# Patient Record
Sex: FEMALE | Race: WHITE | Employment: UNEMPLOYED | URBAN - METROPOLITAN AREA
[De-identification: names, ages, dates, MRNs, and addresses within clinical notes are randomized per-mention and may not be internally consistent; named-entity substitution may affect disease eponyms.]

---

## 2017-05-26 PROBLEM — G21.19 PARKINSONISM DUE TO DRUG (HCC): Status: ACTIVE | Noted: 2017-05-26

## 2017-08-26 PROBLEM — F31.2 BIPOLAR AFFECTIVE DISORDER, CURRENT EPISODE MANIC WITH PSYCHOTIC SYMPTOMS (HCC): Status: ACTIVE | Noted: 2017-08-26

## 2017-09-22 PROBLEM — G21.19 PARKINSONISM DUE TO DRUG (HCC): Status: RESOLVED | Noted: 2017-05-26 | Resolved: 2017-09-22

## 2017-09-22 PROBLEM — K21.9 GASTROESOPHAGEAL REFLUX DISEASE: Status: ACTIVE | Noted: 2017-09-22

## 2017-09-22 PROBLEM — F31.2 BIPOLAR AFFECTIVE DISORDER, CURRENT EPISODE MANIC WITH PSYCHOTIC SYMPTOMS (HCC): Status: RESOLVED | Noted: 2017-08-26 | Resolved: 2017-09-22

## 2017-09-22 PROBLEM — H35.30 MACULAR DEGENERATION, BILATERAL: Status: ACTIVE | Noted: 2017-09-22

## 2018-03-31 ENCOUNTER — HOSPITAL ENCOUNTER (EMERGENCY)
Age: 83
Discharge: HOME | End: 2018-03-31
Attending: EMERGENCY MEDICINE
Payer: COMMERCIAL

## 2018-03-31 ENCOUNTER — APPOINTMENT (OUTPATIENT)
Dept: GENERAL RADIOLOGY | Age: 83
End: 2018-03-31
Payer: COMMERCIAL

## 2018-03-31 VITALS
WEIGHT: 120 LBS | HEART RATE: 70 BPM | HEIGHT: 63 IN | SYSTOLIC BLOOD PRESSURE: 179 MMHG | RESPIRATION RATE: 20 BRPM | BODY MASS INDEX: 21.26 KG/M2 | DIASTOLIC BLOOD PRESSURE: 81 MMHG | OXYGEN SATURATION: 98 % | TEMPERATURE: 96.2 F

## 2018-03-31 DIAGNOSIS — R07.89 CHEST WALL PAIN: Primary | ICD-10-CM

## 2018-03-31 DIAGNOSIS — N30.00 ACUTE CYSTITIS WITHOUT HEMATURIA: ICD-10-CM

## 2018-03-31 LAB
ALBUMIN SERPL-MCNC: 4 G/DL (ref 3.5–5.2)
ALP BLD-CCNC: 31 U/L (ref 35–104)
ALT SERPL-CCNC: 5 U/L (ref 0–32)
ANION GAP SERPL CALCULATED.3IONS-SCNC: 12 MMOL/L (ref 7–16)
AST SERPL-CCNC: 19 U/L (ref 0–31)
BACTERIA: ABNORMAL /HPF
BASOPHILS ABSOLUTE: 0.03 E9/L (ref 0–0.2)
BASOPHILS RELATIVE PERCENT: 0.4 % (ref 0–2)
BILIRUB SERPL-MCNC: 0.4 MG/DL (ref 0–1.2)
BILIRUBIN URINE: NEGATIVE
BLOOD, URINE: ABNORMAL
BUN BLDV-MCNC: 13 MG/DL (ref 8–23)
CALCIUM SERPL-MCNC: 9.6 MG/DL (ref 8.6–10.2)
CHLORIDE BLD-SCNC: 101 MMOL/L (ref 98–107)
CLARITY: ABNORMAL
CO2: 31 MMOL/L (ref 22–29)
COLOR: YELLOW
CREAT SERPL-MCNC: 0.7 MG/DL (ref 0.5–1)
EOSINOPHILS ABSOLUTE: 0.18 E9/L (ref 0.05–0.5)
EOSINOPHILS RELATIVE PERCENT: 2.1 % (ref 0–6)
GFR AFRICAN AMERICAN: >60
GFR NON-AFRICAN AMERICAN: >60 ML/MIN/1.73
GLUCOSE BLD-MCNC: 102 MG/DL (ref 74–109)
GLUCOSE URINE: NEGATIVE MG/DL
HCT VFR BLD CALC: 39.8 % (ref 34–48)
HEMOGLOBIN: 13.2 G/DL (ref 11.5–15.5)
IMMATURE GRANULOCYTES #: 0.02 E9/L
IMMATURE GRANULOCYTES %: 0.2 % (ref 0–5)
KETONES, URINE: NEGATIVE MG/DL
LEUKOCYTE ESTERASE, URINE: ABNORMAL
LYMPHOCYTES ABSOLUTE: 2.93 E9/L (ref 1.5–4)
LYMPHOCYTES RELATIVE PERCENT: 34.9 % (ref 20–42)
MCH RBC QN AUTO: 30.2 PG (ref 26–35)
MCHC RBC AUTO-ENTMCNC: 33.2 % (ref 32–34.5)
MCV RBC AUTO: 91.1 FL (ref 80–99.9)
MONOCYTES ABSOLUTE: 0.67 E9/L (ref 0.1–0.95)
MONOCYTES RELATIVE PERCENT: 8 % (ref 2–12)
NEUTROPHILS ABSOLUTE: 4.56 E9/L (ref 1.8–7.3)
NEUTROPHILS RELATIVE PERCENT: 54.4 % (ref 43–80)
NITRITE, URINE: POSITIVE
PDW BLD-RTO: 13.1 FL (ref 11.5–15)
PH UA: 7.5 (ref 5–9)
PLATELET # BLD: 229 E9/L (ref 130–450)
PMV BLD AUTO: 9.2 FL (ref 7–12)
POTASSIUM SERPL-SCNC: 4 MMOL/L (ref 3.5–5)
PROTEIN UA: NEGATIVE MG/DL
RBC # BLD: 4.37 E12/L (ref 3.5–5.5)
RBC UA: ABNORMAL /HPF (ref 0–2)
SODIUM BLD-SCNC: 144 MMOL/L (ref 132–146)
SPECIFIC GRAVITY UA: 1.01 (ref 1–1.03)
TOTAL PROTEIN: 7.1 G/DL (ref 6.4–8.3)
TROPONIN: <0.01 NG/ML (ref 0–0.03)
UROBILINOGEN, URINE: 0.2 E.U./DL
WBC # BLD: 8.4 E9/L (ref 4.5–11.5)
WBC UA: >20 /HPF (ref 0–5)

## 2018-03-31 PROCEDURE — 71045 X-RAY EXAM CHEST 1 VIEW: CPT

## 2018-03-31 PROCEDURE — 81001 URINALYSIS AUTO W/SCOPE: CPT

## 2018-03-31 PROCEDURE — 85025 COMPLETE CBC W/AUTO DIFF WBC: CPT

## 2018-03-31 PROCEDURE — 93005 ELECTROCARDIOGRAM TRACING: CPT | Performed by: NURSE PRACTITIONER

## 2018-03-31 PROCEDURE — 36415 COLL VENOUS BLD VENIPUNCTURE: CPT

## 2018-03-31 PROCEDURE — 99285 EMERGENCY DEPT VISIT HI MDM: CPT

## 2018-03-31 PROCEDURE — 84484 ASSAY OF TROPONIN QUANT: CPT

## 2018-03-31 PROCEDURE — 80053 COMPREHEN METABOLIC PANEL: CPT

## 2018-03-31 RX ORDER — CEPHALEXIN 500 MG/1
500 CAPSULE ORAL 2 TIMES DAILY
Qty: 14 CAPSULE | Refills: 0 | Status: SHIPPED | OUTPATIENT
Start: 2018-03-31 | End: 2018-04-07

## 2018-03-31 ASSESSMENT — PAIN DESCRIPTION - LOCATION: LOCATION: ARM;CHEST

## 2018-03-31 ASSESSMENT — PAIN SCALES - GENERAL: PAINLEVEL_OUTOF10: 1

## 2018-03-31 ASSESSMENT — PAIN DESCRIPTION - PAIN TYPE: TYPE: ACUTE PAIN

## 2018-04-05 LAB
EKG ATRIAL RATE: 74 BPM
EKG P AXIS: 55 DEGREES
EKG P-R INTERVAL: 198 MS
EKG Q-T INTERVAL: 382 MS
EKG QRS DURATION: 76 MS
EKG QTC CALCULATION (BAZETT): 424 MS
EKG R AXIS: 33 DEGREES
EKG T AXIS: 61 DEGREES
EKG VENTRICULAR RATE: 74 BPM

## 2018-06-19 ENCOUNTER — HOSPITAL ENCOUNTER (EMERGENCY)
Age: 83
Discharge: NURSING, REHAB OR LONG-TERM CARE | End: 2018-06-19
Attending: EMERGENCY MEDICINE
Payer: COMMERCIAL

## 2018-06-19 VITALS
WEIGHT: 120 LBS | HEIGHT: 63 IN | SYSTOLIC BLOOD PRESSURE: 148 MMHG | OXYGEN SATURATION: 96 % | BODY MASS INDEX: 21.26 KG/M2 | TEMPERATURE: 97.7 F | RESPIRATION RATE: 16 BRPM | DIASTOLIC BLOOD PRESSURE: 69 MMHG | HEART RATE: 70 BPM

## 2018-06-19 DIAGNOSIS — R51.9 POUNDING IN HEAD: Primary | ICD-10-CM

## 2018-06-19 LAB
ALBUMIN SERPL-MCNC: 3.9 G/DL (ref 3.5–5.2)
ALP BLD-CCNC: 35 U/L (ref 35–104)
ALT SERPL-CCNC: 8 U/L (ref 0–32)
ANION GAP SERPL CALCULATED.3IONS-SCNC: 10 MMOL/L (ref 7–16)
AST SERPL-CCNC: 24 U/L (ref 0–31)
BACTERIA: ABNORMAL /HPF
BASOPHILS ABSOLUTE: 0.04 E9/L (ref 0–0.2)
BASOPHILS RELATIVE PERCENT: 0.6 % (ref 0–2)
BILIRUB SERPL-MCNC: 0.3 MG/DL (ref 0–1.2)
BILIRUBIN URINE: NEGATIVE
BLOOD, URINE: NEGATIVE
BUN BLDV-MCNC: 15 MG/DL (ref 8–23)
CALCIUM SERPL-MCNC: 9.6 MG/DL (ref 8.6–10.2)
CHLORIDE BLD-SCNC: 100 MMOL/L (ref 98–107)
CLARITY: CLEAR
CO2: 30 MMOL/L (ref 22–29)
COLOR: YELLOW
CREAT SERPL-MCNC: 0.7 MG/DL (ref 0.5–1)
EOSINOPHILS ABSOLUTE: 0.18 E9/L (ref 0.05–0.5)
EOSINOPHILS RELATIVE PERCENT: 2.9 % (ref 0–6)
GFR AFRICAN AMERICAN: >60
GFR NON-AFRICAN AMERICAN: >60 ML/MIN/1.73
GLUCOSE BLD-MCNC: 106 MG/DL (ref 74–109)
GLUCOSE URINE: NEGATIVE MG/DL
HCT VFR BLD CALC: 36.8 % (ref 34–48)
HEMOGLOBIN: 11.9 G/DL (ref 11.5–15.5)
IMMATURE GRANULOCYTES #: 0 E9/L
IMMATURE GRANULOCYTES %: 0 % (ref 0–5)
KETONES, URINE: NEGATIVE MG/DL
LACTIC ACID: 0.9 MMOL/L (ref 0.5–2.2)
LEUKOCYTE ESTERASE, URINE: ABNORMAL
LYMPHOCYTES ABSOLUTE: 2.91 E9/L (ref 1.5–4)
LYMPHOCYTES RELATIVE PERCENT: 46.4 % (ref 20–42)
MCH RBC QN AUTO: 29.2 PG (ref 26–35)
MCHC RBC AUTO-ENTMCNC: 32.3 % (ref 32–34.5)
MCV RBC AUTO: 90.2 FL (ref 80–99.9)
MONOCYTES ABSOLUTE: 0.61 E9/L (ref 0.1–0.95)
MONOCYTES RELATIVE PERCENT: 9.7 % (ref 2–12)
NEUTROPHILS ABSOLUTE: 2.53 E9/L (ref 1.8–7.3)
NEUTROPHILS RELATIVE PERCENT: 40.4 % (ref 43–80)
NITRITE, URINE: NEGATIVE
PDW BLD-RTO: 13.6 FL (ref 11.5–15)
PH UA: 6.5 (ref 5–9)
PLATELET # BLD: 204 E9/L (ref 130–450)
PMV BLD AUTO: 10.1 FL (ref 7–12)
POTASSIUM SERPL-SCNC: 4.5 MMOL/L (ref 3.5–5)
PROTEIN UA: NEGATIVE MG/DL
RBC # BLD: 4.08 E12/L (ref 3.5–5.5)
RBC UA: ABNORMAL /HPF (ref 0–2)
SODIUM BLD-SCNC: 140 MMOL/L (ref 132–146)
SPECIFIC GRAVITY UA: <=1.005 (ref 1–1.03)
TOTAL PROTEIN: 7.2 G/DL (ref 6.4–8.3)
UROBILINOGEN, URINE: 0.2 E.U./DL
WBC # BLD: 6.3 E9/L (ref 4.5–11.5)
WBC UA: ABNORMAL /HPF (ref 0–5)

## 2018-06-19 PROCEDURE — 80053 COMPREHEN METABOLIC PANEL: CPT

## 2018-06-19 PROCEDURE — 36415 COLL VENOUS BLD VENIPUNCTURE: CPT

## 2018-06-19 PROCEDURE — 6360000002 HC RX W HCPCS: Performed by: EMERGENCY MEDICINE

## 2018-06-19 PROCEDURE — 96375 TX/PRO/DX INJ NEW DRUG ADDON: CPT

## 2018-06-19 PROCEDURE — 2580000003 HC RX 258: Performed by: EMERGENCY MEDICINE

## 2018-06-19 PROCEDURE — 99284 EMERGENCY DEPT VISIT MOD MDM: CPT

## 2018-06-19 PROCEDURE — 93005 ELECTROCARDIOGRAM TRACING: CPT | Performed by: EMERGENCY MEDICINE

## 2018-06-19 PROCEDURE — 96374 THER/PROPH/DIAG INJ IV PUSH: CPT

## 2018-06-19 PROCEDURE — 83605 ASSAY OF LACTIC ACID: CPT

## 2018-06-19 PROCEDURE — 85025 COMPLETE CBC W/AUTO DIFF WBC: CPT

## 2018-06-19 PROCEDURE — 81001 URINALYSIS AUTO W/SCOPE: CPT

## 2018-06-19 RX ORDER — OLANZAPINE 7.5 MG/1
7.5 TABLET ORAL DAILY
COMMUNITY

## 2018-06-19 RX ORDER — LORAZEPAM 0.5 MG/1
0.5 TABLET ORAL 3 TIMES DAILY
COMMUNITY
End: 2018-06-30 | Stop reason: SDUPTHER

## 2018-06-19 RX ORDER — MONTELUKAST SODIUM 10 MG/1
10 TABLET ORAL NIGHTLY
COMMUNITY

## 2018-06-19 RX ORDER — DIPHENHYDRAMINE HYDROCHLORIDE 50 MG/ML
25 INJECTION INTRAMUSCULAR; INTRAVENOUS ONCE
Status: COMPLETED | OUTPATIENT
Start: 2018-06-19 | End: 2018-06-19

## 2018-06-19 RX ORDER — BISACODYL 10 MG
10 SUPPOSITORY, RECTAL RECTAL DAILY PRN
COMMUNITY

## 2018-06-19 RX ORDER — 0.9 % SODIUM CHLORIDE 0.9 %
1000 INTRAVENOUS SOLUTION INTRAVENOUS ONCE
Status: COMPLETED | OUTPATIENT
Start: 2018-06-19 | End: 2018-06-19

## 2018-06-19 RX ORDER — METOCLOPRAMIDE HYDROCHLORIDE 5 MG/ML
10 INJECTION INTRAMUSCULAR; INTRAVENOUS ONCE
Status: COMPLETED | OUTPATIENT
Start: 2018-06-19 | End: 2018-06-19

## 2018-06-19 RX ORDER — RIVASTIGMINE TARTRATE 6 MG/1
6 CAPSULE ORAL 2 TIMES DAILY
COMMUNITY

## 2018-06-19 RX ORDER — ONDANSETRON 4 MG/1
4 TABLET, FILM COATED ORAL EVERY 8 HOURS PRN
COMMUNITY
End: 2019-08-18

## 2018-06-19 RX ORDER — MEMANTINE HYDROCHLORIDE 10 MG/1
10 TABLET ORAL 2 TIMES DAILY
COMMUNITY

## 2018-06-19 RX ORDER — DOCUSATE SODIUM 100 MG/1
100 CAPSULE, LIQUID FILLED ORAL 2 TIMES DAILY
COMMUNITY

## 2018-06-19 RX ORDER — OXYBUTYNIN CHLORIDE 10 MG/1
10 TABLET, EXTENDED RELEASE ORAL DAILY
COMMUNITY

## 2018-06-19 RX ADMIN — DIPHENHYDRAMINE HYDROCHLORIDE 25 MG: 50 INJECTION, SOLUTION INTRAMUSCULAR; INTRAVENOUS at 17:34

## 2018-06-19 RX ADMIN — SODIUM CHLORIDE 1000 ML: 9 INJECTION, SOLUTION INTRAVENOUS at 16:51

## 2018-06-19 RX ADMIN — METOCLOPRAMIDE 10 MG: 5 INJECTION, SOLUTION INTRAMUSCULAR; INTRAVENOUS at 17:34

## 2018-06-19 ASSESSMENT — ENCOUNTER SYMPTOMS
EYE DISCHARGE: 0
DIARRHEA: 0
COUGH: 0
ABDOMINAL DISTENTION: 0
EYE REDNESS: 0
EYE PAIN: 0
NAUSEA: 0
VOMITING: 0
SHORTNESS OF BREATH: 0
SINUS PRESSURE: 0
WHEEZING: 0
BACK PAIN: 0
SORE THROAT: 0

## 2018-06-22 LAB
EKG ATRIAL RATE: 61 BPM
EKG P AXIS: 43 DEGREES
EKG P-R INTERVAL: 204 MS
EKG Q-T INTERVAL: 404 MS
EKG QRS DURATION: 88 MS
EKG QTC CALCULATION (BAZETT): 406 MS
EKG R AXIS: 26 DEGREES
EKG T AXIS: 69 DEGREES
EKG VENTRICULAR RATE: 61 BPM

## 2018-10-25 ENCOUNTER — TELEPHONE (OUTPATIENT)
Dept: ORTHOPEDIC SURGERY | Age: 83
End: 2018-10-25

## 2018-10-25 DIAGNOSIS — G89.29 CHRONIC PAIN OF RIGHT KNEE: Primary | ICD-10-CM

## 2018-10-25 DIAGNOSIS — M25.561 CHRONIC PAIN OF RIGHT KNEE: Primary | ICD-10-CM

## 2018-11-13 ENCOUNTER — HOSPITAL ENCOUNTER (OUTPATIENT)
Dept: GENERAL RADIOLOGY | Age: 83
Discharge: HOME | End: 2018-11-15
Payer: COMMERCIAL

## 2018-11-13 ENCOUNTER — OFFICE VISIT (OUTPATIENT)
Dept: ORTHOPEDIC SURGERY | Age: 83
End: 2018-11-13
Payer: COMMERCIAL

## 2018-11-13 VITALS — DIASTOLIC BLOOD PRESSURE: 83 MMHG | RESPIRATION RATE: 18 BRPM | HEART RATE: 68 BPM | SYSTOLIC BLOOD PRESSURE: 152 MMHG

## 2018-11-13 DIAGNOSIS — M17.11 PRIMARY OSTEOARTHRITIS OF RIGHT KNEE: ICD-10-CM

## 2018-11-13 DIAGNOSIS — G89.29 CHRONIC RIGHT-SIDED LOW BACK PAIN WITH RIGHT-SIDED SCIATICA: ICD-10-CM

## 2018-11-13 DIAGNOSIS — G89.29 CHRONIC PAIN OF RIGHT KNEE: ICD-10-CM

## 2018-11-13 DIAGNOSIS — M25.561 CHRONIC PAIN OF RIGHT KNEE: ICD-10-CM

## 2018-11-13 DIAGNOSIS — M54.41 CHRONIC RIGHT-SIDED LOW BACK PAIN WITH RIGHT-SIDED SCIATICA: ICD-10-CM

## 2018-11-13 PROCEDURE — 73560 X-RAY EXAM OF KNEE 1 OR 2: CPT

## 2018-11-13 PROCEDURE — G8428 CUR MEDS NOT DOCUMENT: HCPCS | Performed by: ORTHOPAEDIC SURGERY

## 2018-11-13 PROCEDURE — G8420 CALC BMI NORM PARAMETERS: HCPCS | Performed by: ORTHOPAEDIC SURGERY

## 2018-11-13 PROCEDURE — 1123F ACP DISCUSS/DSCN MKR DOCD: CPT | Performed by: ORTHOPAEDIC SURGERY

## 2018-11-13 PROCEDURE — 4040F PNEUMOC VAC/ADMIN/RCVD: CPT | Performed by: ORTHOPAEDIC SURGERY

## 2018-11-13 PROCEDURE — 1090F PRES/ABSN URINE INCON ASSESS: CPT | Performed by: ORTHOPAEDIC SURGERY

## 2018-11-13 PROCEDURE — 1036F TOBACCO NON-USER: CPT | Performed by: ORTHOPAEDIC SURGERY

## 2018-11-13 PROCEDURE — 1101F PT FALLS ASSESS-DOCD LE1/YR: CPT | Performed by: ORTHOPAEDIC SURGERY

## 2018-11-13 PROCEDURE — G8400 PT W/DXA NO RESULTS DOC: HCPCS | Performed by: ORTHOPAEDIC SURGERY

## 2018-11-13 PROCEDURE — G8484 FLU IMMUNIZE NO ADMIN: HCPCS | Performed by: ORTHOPAEDIC SURGERY

## 2018-11-13 PROCEDURE — 99213 OFFICE O/P EST LOW 20 MIN: CPT | Performed by: ORTHOPAEDIC SURGERY

## 2018-11-13 PROCEDURE — 99202 OFFICE O/P NEW SF 15 MIN: CPT

## 2018-11-13 RX ORDER — LORATADINE 10 MG/1
10 TABLET ORAL DAILY
COMMUNITY

## 2018-11-13 RX ORDER — LORAZEPAM 0.5 MG/1
1 TABLET ORAL 2 TIMES DAILY
COMMUNITY

## 2018-11-13 RX ORDER — VITAMIN E 268 MG
400 CAPSULE ORAL DAILY
COMMUNITY

## 2018-11-13 RX ORDER — MIRTAZAPINE 30 MG/1
22.5 TABLET, FILM COATED ORAL NIGHTLY
COMMUNITY

## 2018-11-13 RX ORDER — CHOLECALCIFEROL (VITAMIN D3) 125 MCG
5 CAPSULE ORAL DAILY
COMMUNITY

## 2018-11-13 NOTE — PROGRESS NOTES
Established Patient      Lindsey Del Real is a 80 y.o. female, herdate of birth is 5/12/1933 with the following history as recorded in [facility]:    HPI: Patient is a very pleasant 80-year-old female who comes in today with a chief complaint of right buttock and right knee pain. She is a resident at a nursing facility was in an due to x-ray showing right knee osteoarthritis. She is apparently been having pain especially in the mornings and her right lower back, right buttock and right knee. She is sent to see if there is a mainly needed to do with her right knee osteoarthritis. Her son accompanied to her appointment today. She denies any trauma. She states that the back pain has been going on for some time now. She states his private over 3 months. Patient Active Problem List    Diagnosis Date Noted    Primary osteoarthritis of right knee 11/13/2018    Chronic right-sided low back pain with right-sided sciatica 11/13/2018    Gastroesophageal reflux disease 09/22/2017    Macular degeneration, bilateral 09/22/2017    Anxiety disorder 11/16/2016    Depression 04/23/2016    Bipolar affective disorder, current episode manic with psychotic symptoms ([facility] [address] 04/20/2016    Cardiac standstill ([facility] [address] 10/23/2011    Urinary tract infection without hematuria 10/19/2011     Current Outpatient Prescriptions   Medication Sig Dispense Refill    LORazepam (ATIVAN) 0.5 MG tablet Take 0.5 mg by mouth every 6 hours as needed for Anxiety. Cynthia Dennis vitamin D 1000 units CAPS Take 2 capsules by mouth daily      loratadine (CLARITIN) 10 MG tablet Take 10 mg by mouth daily      melatonin 3 MG TABS tablet Take 5 mg by mouth daily      mirtazapine (REMERON) 30 MG tablet Take 30 mg by mouth nightly      vitamin E 400 UNIT capsule Take 400 Units by mouth daily      diclofenac sodium 1 % GEL Apply 2 g topically 4 times daily      oxybutynin (DITROPAN-XL) 10 MG extended release tablet Take 10 mg by mouth daily      montelukast family history.   Social History   Substance Use Topics    Smoking status: Never Smoker    Smokeless tobacco: Never Used    Alcohol use No       Review of Systems     Review of Systems - General ROS: negative for - chills, fatigue, fever, malaise or night sweats  Ophthalmic ROS: negative for - blurry vision, decreased vision, double vision, dry eyes, excessive tearing, eye pain or loss of vision  ENT ROS: negative for - headaches, hearing change, nasal congestion, sinus pain, sore throat, tinnitus or vertigo  Allergy and Immunology ROS: negative for - itchy/watery eyes, nasal congestion, postnasal drip or seasonal allergies  Hematological and Lymphatic ROS: negative for - bleeding problems, blood clots, bruising, fatigue, jaundice, night sweats or swollen lymph nodes  Endocrine ROS: negative for - mood swings, palpitations, polydipsia/polyuria, skin changes or temperature intolerance  Respiratory ROS: no cough, shortness of breath, or wheezing  Cardiovascular ROS: no chest pain or dyspnea on exertion  Gastrointestinal ROS: no abdominal pain, change in bowel habits, or black or bloody stools  Genito-Urinary ROS: no dysuria, trouble voiding, or hematuria  Musculoskeletal ROS: Right-sided back pain and right lower extremity pain  Neurological ROS: no TIA or stroke symptoms      Physical Examination:   Vitals:    11/13/18 1202   BP: (!) 152/83   Pulse: 68   Resp: 18       Physical Examination: General appearance - alert, well appearing, and in no distress, oriented to person, place, and time and overweight  Mental status - alert, oriented to person, place, and time, normal mood, behavior, speech, dress, motor activity, and thought processes  Back exam - full range of motion, no tenderness, palpable spasm or pain on motion, negative straight leg raise on the left but slightly positive on the right, sacroiliac joints and sciatic notches nontender, normal reflexes and strength bilateral lower extremities, sensory exam

## 2019-05-26 ENCOUNTER — APPOINTMENT (OUTPATIENT)
Dept: GENERAL RADIOLOGY | Age: 84
End: 2019-05-26
Payer: COMMERCIAL

## 2019-05-26 ENCOUNTER — HOSPITAL ENCOUNTER (EMERGENCY)
Age: 84
Discharge: HOME | End: 2019-05-26
Attending: EMERGENCY MEDICINE
Payer: COMMERCIAL

## 2019-05-26 VITALS
BODY MASS INDEX: 21.26 KG/M2 | SYSTOLIC BLOOD PRESSURE: 185 MMHG | OXYGEN SATURATION: 97 % | DIASTOLIC BLOOD PRESSURE: 57 MMHG | RESPIRATION RATE: 20 BRPM | HEART RATE: 78 BPM | WEIGHT: 120 LBS | TEMPERATURE: 98 F | HEIGHT: 63 IN

## 2019-05-26 DIAGNOSIS — K59.00 CONSTIPATION, UNSPECIFIED CONSTIPATION TYPE: Primary | ICD-10-CM

## 2019-05-26 PROCEDURE — 99283 EMERGENCY DEPT VISIT LOW MDM: CPT

## 2019-05-26 PROCEDURE — 74019 RADEX ABDOMEN 2 VIEWS: CPT

## 2019-05-26 ASSESSMENT — PAIN DESCRIPTION - DESCRIPTORS: DESCRIPTORS: CRAMPING;DISCOMFORT

## 2019-05-26 ASSESSMENT — PAIN DESCRIPTION - PAIN TYPE
TYPE: ACUTE PAIN
TYPE: ACUTE PAIN

## 2019-05-26 ASSESSMENT — PAIN DESCRIPTION - LOCATION
LOCATION: ABDOMEN
LOCATION: ABDOMEN;RECTUM

## 2019-05-26 ASSESSMENT — PAIN SCALES - GENERAL
PAINLEVEL_OUTOF10: 8
PAINLEVEL_OUTOF10: 8

## 2019-05-26 ASSESSMENT — PAIN DESCRIPTION - ORIENTATION: ORIENTATION: LOWER

## 2019-05-26 ASSESSMENT — PAIN - FUNCTIONAL ASSESSMENT: PAIN_FUNCTIONAL_ASSESSMENT: PREVENTS OR INTERFERES WITH MANY ACTIVE NOT PASSIVE ACTIVITIES

## 2019-05-26 ASSESSMENT — PAIN DESCRIPTION - ONSET: ONSET: ON-GOING

## 2019-05-26 ASSESSMENT — PAIN DESCRIPTION - FREQUENCY: FREQUENCY: CONTINUOUS

## 2019-05-26 NOTE — ED NOTES
Called transport for [facility], they will be working on arranging ride for pt to go back to [place] [facility].      Leonides Salgado RN  05/26/19 1932

## 2019-05-26 NOTE — ED PROVIDER NOTES
HPI:  5/26/19,   Time: 1:40 PM         Hayden Damon is a 80 y.o. female presenting to the ED for Constipation pain and dysuria , beginning several hours ago. The complaint has been persistent, moderate in severity, and worsened by nothing. Started several days ago. Patient's unable to urinate. She denies diarrhea nausea or vomiting. She does have pain per around the rectum. States she was on the toilet for 2 hours trying to move her bowels. She has no fevers chills chest pain or shortness of breath. ROS:   Pertinent positives and negatives are stated within HPI, all other systems reviewed and are negative.  --------------------------------------------- PAST HISTORY ---------------------------------------------  Past Medical History:  has a past medical history of Bipolar affective disorder, current episode manic with psychotic symptoms ([facility] [address] Cancer ([facility] [address] Chronic right-sided low back pain with right-sided sciatica, Depression, Diabetes mellitus ([facility] [address] Macular degeneration, and Primary osteoarthritis of right knee. Past Surgical History:  has a past surgical history that includes Hysterectomy; Appendectomy; Tonsillectomy; Cardiac catheterization (10/21/2011); fracture surgery; Upper gastrointestinal endoscopy (1/13/16); Ankle fracture surgery (Left, [id number]); and partial hysterectomy (cervix not removed). Social History:  reports that she has never smoked. She has never used smokeless tobacco. She reports that she does not drink alcohol or use drugs. Family History: family history is not on file. The patients home medications have been reviewed. Allergies: Patient has no known allergies. -------------------------------------------------- RESULTS -------------------------------------------------  All laboratory and radiology results have been personally reviewed by myself   LABS:  No results found for this visit on 05/26/19.     RADIOLOGY:  Interpreted by Radiologist.  XR ABDOMEN (2 VIEWS)   Final Result   Nonspecific bowel gas pattern. There is a large amount of stool throughout the colon. Please   correlate clinically for constipation                ------------------------- NURSING NOTES AND VITALS REVIEWED ---------------------------   The nursing notes within the ED encounter and vital signs as below have been reviewed. BP (!) 185/57   Pulse 78   Temp 98 °F (36.7 °C) (Oral)   Resp 20   Ht 5' 3\" (1.6 m)   Wt 120 lb (54.4 kg)   LMP  (LMP Unknown)   SpO2 97%   BMI 21.26 kg/m²   Oxygen Saturation Interpretation: Normal      ---------------------------------------------------PHYSICAL EXAM--------------------------------------      Constitutional/General: Alert and oriented x3, well appearing, non toxic in NAD, thin in appearance. Head: NC/AT  Eyes: PERRL, EOMI  Mouth: Oropharynx clear, handling secretions, no trismus  Neck: Supple, full ROM, no meningeal signs  Pulmonary: Lungs clear to auscultation bilaterally, no wheezes, rales, or rhonchi. Not in respiratory distress  Cardiovascular:  Regular rate and rhythm, no murmurs, gallops, or rubs. 2+ distal pulses  Abdomen: Soft, non tender, non distended,   Extremities: Moves all extremities x 4. Warm and well perfused  Skin: warm and dry without rash  Neurologic: GCS 15, NO focal deficits. Psych: Normal Affect      ------------------------------ ED COURSE/MEDICAL DECISION MAKING----------------------  Medications - No data to display      Medical Decision Making:    KUB demonstrates large amount of stool. Patient is given soapsuds enema. DC home with RX for Magnesium Citrate. Counseling: The emergency provider has spoken with the patient and discussed todays results, in addition to providing specific details for the plan of care and counseling regarding the diagnosis and prognosis.   Questions are answered at this time and they are agreeable with the plan.      --------------------------------- IMPRESSION AND DISPOSITION ---------------------------------    IMPRESSION  1.  Constipation, unspecified constipation type        DISPOSITION  Disposition: Discharge to home  Patient condition is stable                Abbe Lowe DO  05/27/19 3314

## 2019-05-26 NOTE — ED NOTES
Soap suds enema given approx 30 mins, very little amount of stool released. Flatulence and liquid from enema were explained. lg void.  bsc was given to pt and she was placed on     Shaista Faustina, [place]  05/26/19 5524

## 2019-05-27 NOTE — ED NOTES
Son was also called, he was unable to  pt due to being out of town. Anthony Glynn is on way to transport pt back to [facility] on [address].      Hoda Cook RN  05/26/19 9733

## 2019-07-06 ENCOUNTER — OUTSIDE SERVICES (OUTPATIENT)
Dept: PODIATRY | Age: 84
End: 2019-07-06
Payer: COMMERCIAL

## 2019-07-06 DIAGNOSIS — Z79.4 TYPE 2 DIABETES MELLITUS WITHOUT COMPLICATION, WITH LONG-TERM CURRENT USE OF INSULIN (HCC): ICD-10-CM

## 2019-07-06 DIAGNOSIS — E11.9 TYPE 2 DIABETES MELLITUS WITHOUT COMPLICATION, WITH LONG-TERM CURRENT USE OF INSULIN (HCC): ICD-10-CM

## 2019-07-06 DIAGNOSIS — B35.1 TINEA UNGUIUM: Primary | ICD-10-CM

## 2019-07-06 DIAGNOSIS — L84 CORN OR CALLUS: ICD-10-CM

## 2019-07-06 DIAGNOSIS — M79.675 PAIN IN LEFT TOE(S): ICD-10-CM

## 2019-07-06 DIAGNOSIS — M79.674 PAIN IN TOE OF RIGHT FOOT: ICD-10-CM

## 2019-07-06 DIAGNOSIS — I73.9 PERIPHERAL VASCULAR DISEASE, UNSPECIFIED (HCC): ICD-10-CM

## 2019-07-06 DIAGNOSIS — R26.2 DIFFICULTY WALKING: ICD-10-CM

## 2019-07-06 PROCEDURE — 11721 DEBRIDE NAIL 6 OR MORE: CPT | Performed by: PODIATRIST

## 2019-07-06 PROCEDURE — 11055 PARING/CUTG B9 HYPRKER LES 1: CPT | Performed by: PODIATRIST

## 2019-07-06 NOTE — PROGRESS NOTES
[id number] [facility] patient     No chief complaint on file. Subjective: Gladys Tam is seen in nursing home  per nursing for foot and nail care and callus care   Pt currently has complaint of thickened, painful, elongated nails that he/she cannot manage by themselves. Pt. Relates pain to nails with shoe gear. Pt's primary care physician is Dr. Hero Edwards last visit 6/20/2019  Past Medical History:   Diagnosis Date    Bipolar affective disorder, current episode manic with psychotic symptoms ([facility] [address] 8/26/2017    Cancer ([facility] [address]     Chronic right-sided low back pain with right-sided sciatica 11/13/2018    Depression     Diabetes mellitus ([facility] [address]     Macular degeneration     Primary osteoarthritis of right knee 11/13/2018       No Known Allergies  Current Outpatient Medications on File Prior to Visit   Medication Sig Dispense Refill    magnesium citrate solution Take 296 mLs by mouth once for 1 dose 296 mL 0    LORazepam (ATIVAN) 0.5 MG tablet Take 0.5 mg by mouth every 6 hours as needed for Anxiety. Kilo Sharpe vitamin D 1000 units CAPS Take 2 capsules by mouth daily      loratadine (CLARITIN) 10 MG tablet Take 10 mg by mouth daily      melatonin 3 MG TABS tablet Take 5 mg by mouth daily      mirtazapine (REMERON) 30 MG tablet Take 30 mg by mouth nightly      vitamin E 400 UNIT capsule Take 400 Units by mouth daily      diclofenac sodium 1 % GEL Apply 2 g topically 4 times daily      oxybutynin (DITROPAN-XL) 10 MG extended release tablet Take 10 mg by mouth daily      montelukast (SINGULAIR) 10 MG tablet Take 10 mg by mouth nightly      VORTIoxetine (TRINTELLIX) 10 MG TABS tablet Take 10 mg by mouth nightly      docusate sodium (COLACE) 100 MG capsule Take 100 mg by mouth 2 times daily      rivastigmine (EXELON) 6 MG capsule Take 6 mg by mouth 2 times daily      memantine (NAMENDA) 10 MG tablet Take 10 mg by mouth 2 times daily      OLANZapine (ZYPREXA) 2.5 MG tablet Take 2.5

## 2019-08-18 ENCOUNTER — HOSPITAL ENCOUNTER (EMERGENCY)
Age: 84
Discharge: OTHER INSTITUTION | End: 2019-08-18
Attending: EMERGENCY MEDICINE
Payer: COMMERCIAL

## 2019-08-18 ENCOUNTER — APPOINTMENT (OUTPATIENT)
Dept: CT IMAGING | Age: 84
End: 2019-08-18
Payer: COMMERCIAL

## 2019-08-18 VITALS
RESPIRATION RATE: 16 BRPM | SYSTOLIC BLOOD PRESSURE: 132 MMHG | WEIGHT: 120 LBS | BODY MASS INDEX: 21.26 KG/M2 | DIASTOLIC BLOOD PRESSURE: 92 MMHG | HEART RATE: 72 BPM | OXYGEN SATURATION: 96 % | TEMPERATURE: 97.9 F | HEIGHT: 63 IN

## 2019-08-18 DIAGNOSIS — S32.028A OTHER CLOSED FRACTURE OF SECOND LUMBAR VERTEBRA, INITIAL ENCOUNTER (HCC): Primary | ICD-10-CM

## 2019-08-18 DIAGNOSIS — R31.9 URINARY TRACT INFECTION WITH HEMATURIA, SITE UNSPECIFIED: ICD-10-CM

## 2019-08-18 DIAGNOSIS — N39.0 URINARY TRACT INFECTION WITH HEMATURIA, SITE UNSPECIFIED: ICD-10-CM

## 2019-08-18 DIAGNOSIS — F41.1 ANXIETY STATE: ICD-10-CM

## 2019-08-18 DIAGNOSIS — I10 HYPERTENSION, UNSPECIFIED TYPE: ICD-10-CM

## 2019-08-18 LAB
ALBUMIN SERPL-MCNC: 3.8 G/DL (ref 3.5–5.2)
ALP BLD-CCNC: 47 U/L (ref 35–104)
ALT SERPL-CCNC: 12 U/L (ref 0–32)
ANION GAP SERPL CALCULATED.3IONS-SCNC: 14 MMOL/L (ref 7–16)
AST SERPL-CCNC: 28 U/L (ref 0–31)
BACTERIA: ABNORMAL /HPF
BASOPHILS ABSOLUTE: 0.03 E9/L (ref 0–0.2)
BASOPHILS RELATIVE PERCENT: 0.4 % (ref 0–2)
BILIRUB SERPL-MCNC: 0.5 MG/DL (ref 0–1.2)
BILIRUBIN URINE: NEGATIVE
BLOOD, URINE: ABNORMAL
BUN BLDV-MCNC: 11 MG/DL (ref 8–23)
CALCIUM SERPL-MCNC: 9.6 MG/DL (ref 8.6–10.2)
CHLORIDE BLD-SCNC: 99 MMOL/L (ref 98–107)
CLARITY: ABNORMAL
CO2: 26 MMOL/L (ref 22–29)
COLOR: YELLOW
CREAT SERPL-MCNC: 0.6 MG/DL (ref 0.5–1)
EOSINOPHILS ABSOLUTE: 0.01 E9/L (ref 0.05–0.5)
EOSINOPHILS RELATIVE PERCENT: 0.1 % (ref 0–6)
EPITHELIAL CELLS, UA: ABNORMAL /HPF
GFR AFRICAN AMERICAN: >60
GFR NON-AFRICAN AMERICAN: >60 ML/MIN/1.73
GLUCOSE BLD-MCNC: 114 MG/DL (ref 74–99)
GLUCOSE URINE: NEGATIVE MG/DL
HCT VFR BLD CALC: 41.1 % (ref 34–48)
HEMOGLOBIN: 13.6 G/DL (ref 11.5–15.5)
IMMATURE GRANULOCYTES #: 0.02 E9/L
IMMATURE GRANULOCYTES %: 0.3 % (ref 0–5)
KETONES, URINE: NEGATIVE MG/DL
LEUKOCYTE ESTERASE, URINE: ABNORMAL
LIPASE: 12 U/L (ref 13–60)
LYMPHOCYTES ABSOLUTE: 1.71 E9/L (ref 1.5–4)
LYMPHOCYTES RELATIVE PERCENT: 22.7 % (ref 20–42)
MCH RBC QN AUTO: 30.6 PG (ref 26–35)
MCHC RBC AUTO-ENTMCNC: 33.1 % (ref 32–34.5)
MCV RBC AUTO: 92.6 FL (ref 80–99.9)
MONOCYTES ABSOLUTE: 0.6 E9/L (ref 0.1–0.95)
MONOCYTES RELATIVE PERCENT: 8 % (ref 2–12)
NEUTROPHILS ABSOLUTE: 5.17 E9/L (ref 1.8–7.3)
NEUTROPHILS RELATIVE PERCENT: 68.5 % (ref 43–80)
NITRITE, URINE: NEGATIVE
PDW BLD-RTO: 13.2 FL (ref 11.5–15)
PH UA: 5.5 (ref 5–9)
PLATELET # BLD: 220 E9/L (ref 130–450)
PMV BLD AUTO: 10.2 FL (ref 7–12)
POTASSIUM REFLEX MAGNESIUM: 4.5 MMOL/L (ref 3.5–5)
PRO-BNP: 234 PG/ML (ref 0–450)
PROTEIN UA: NEGATIVE MG/DL
RBC # BLD: 4.44 E12/L (ref 3.5–5.5)
RBC UA: ABNORMAL /HPF (ref 0–2)
RENAL EPITHELIAL, UA: ABNORMAL /HPF
SODIUM BLD-SCNC: 139 MMOL/L (ref 132–146)
SPECIFIC GRAVITY UA: 1.01 (ref 1–1.03)
TOTAL PROTEIN: 7.7 G/DL (ref 6.4–8.3)
TROPONIN: <0.01 NG/ML (ref 0–0.03)
UROBILINOGEN, URINE: 0.2 E.U./DL
WBC # BLD: 7.5 E9/L (ref 4.5–11.5)
WBC UA: >20 /HPF (ref 0–5)

## 2019-08-18 PROCEDURE — 84484 ASSAY OF TROPONIN QUANT: CPT

## 2019-08-18 PROCEDURE — 72131 CT LUMBAR SPINE W/O DYE: CPT

## 2019-08-18 PROCEDURE — 85025 COMPLETE CBC W/AUTO DIFF WBC: CPT

## 2019-08-18 PROCEDURE — 83690 ASSAY OF LIPASE: CPT

## 2019-08-18 PROCEDURE — 96374 THER/PROPH/DIAG INJ IV PUSH: CPT

## 2019-08-18 PROCEDURE — 96375 TX/PRO/DX INJ NEW DRUG ADDON: CPT

## 2019-08-18 PROCEDURE — 93005 ELECTROCARDIOGRAM TRACING: CPT | Performed by: EMERGENCY MEDICINE

## 2019-08-18 PROCEDURE — 74177 CT ABD & PELVIS W/CONTRAST: CPT

## 2019-08-18 PROCEDURE — 87088 URINE BACTERIA CULTURE: CPT

## 2019-08-18 PROCEDURE — 2500000003 HC RX 250 WO HCPCS: Performed by: EMERGENCY MEDICINE

## 2019-08-18 PROCEDURE — 2580000003 HC RX 258: Performed by: RADIOLOGY

## 2019-08-18 PROCEDURE — 6360000002 HC RX W HCPCS: Performed by: EMERGENCY MEDICINE

## 2019-08-18 PROCEDURE — 81001 URINALYSIS AUTO W/SCOPE: CPT

## 2019-08-18 PROCEDURE — 83880 ASSAY OF NATRIURETIC PEPTIDE: CPT

## 2019-08-18 PROCEDURE — 6360000004 HC RX CONTRAST MEDICATION: Performed by: RADIOLOGY

## 2019-08-18 PROCEDURE — 87186 SC STD MICRODIL/AGAR DIL: CPT

## 2019-08-18 PROCEDURE — 99284 EMERGENCY DEPT VISIT MOD MDM: CPT

## 2019-08-18 PROCEDURE — 36415 COLL VENOUS BLD VENIPUNCTURE: CPT

## 2019-08-18 PROCEDURE — 72128 CT CHEST SPINE W/O DYE: CPT

## 2019-08-18 PROCEDURE — 6370000000 HC RX 637 (ALT 250 FOR IP): Performed by: EMERGENCY MEDICINE

## 2019-08-18 PROCEDURE — 80053 COMPREHEN METABOLIC PANEL: CPT

## 2019-08-18 PROCEDURE — 96376 TX/PRO/DX INJ SAME DRUG ADON: CPT

## 2019-08-18 RX ORDER — HYDROCODONE BITARTRATE AND ACETAMINOPHEN 5; 325 MG/1; MG/1
1 TABLET ORAL EVERY 6 HOURS PRN
Qty: 12 TABLET | Refills: 0 | Status: SHIPPED | OUTPATIENT
Start: 2019-08-18 | End: 2019-08-21

## 2019-08-18 RX ORDER — SENNA PLUS 8.6 MG/1
1 TABLET ORAL DAILY
COMMUNITY

## 2019-08-18 RX ORDER — CEFDINIR 300 MG/1
300 CAPSULE ORAL 2 TIMES DAILY
Qty: 14 CAPSULE | Refills: 0 | Status: SHIPPED | OUTPATIENT
Start: 2019-08-18 | End: 2019-08-25

## 2019-08-18 RX ORDER — LACTULOSE 10 G/15ML
20 SOLUTION ORAL PRN
COMMUNITY

## 2019-08-18 RX ORDER — LORAZEPAM 2 MG/ML
0.5 INJECTION INTRAMUSCULAR ONCE
Status: COMPLETED | OUTPATIENT
Start: 2019-08-18 | End: 2019-08-18

## 2019-08-18 RX ORDER — AZELASTINE HYDROCHLORIDE 137 UG/1
137 SPRAY, METERED NASAL DAILY
COMMUNITY

## 2019-08-18 RX ORDER — FENTANYL CITRATE 50 UG/ML
25 INJECTION, SOLUTION INTRAMUSCULAR; INTRAVENOUS ONCE
Status: COMPLETED | OUTPATIENT
Start: 2019-08-18 | End: 2019-08-18

## 2019-08-18 RX ORDER — CEFDINIR 300 MG/1
300 CAPSULE ORAL ONCE
Status: COMPLETED | OUTPATIENT
Start: 2019-08-18 | End: 2019-08-18

## 2019-08-18 RX ORDER — SODIUM CHLORIDE 0.9 % (FLUSH) 0.9 %
10 SYRINGE (ML) INJECTION ONCE
Status: COMPLETED | OUTPATIENT
Start: 2019-08-18 | End: 2019-08-18

## 2019-08-18 RX ORDER — LABETALOL HYDROCHLORIDE 5 MG/ML
20 INJECTION, SOLUTION INTRAVENOUS ONCE
Status: COMPLETED | OUTPATIENT
Start: 2019-08-18 | End: 2019-08-18

## 2019-08-18 RX ADMIN — Medication 10 ML: at 14:11

## 2019-08-18 RX ADMIN — LORAZEPAM 0.5 MG: 2 INJECTION INTRAMUSCULAR; INTRAVENOUS at 15:02

## 2019-08-18 RX ADMIN — FENTANYL CITRATE 25 MCG: 50 INJECTION, SOLUTION INTRAMUSCULAR; INTRAVENOUS at 12:42

## 2019-08-18 RX ADMIN — IOPAMIDOL 110 ML: 755 INJECTION, SOLUTION INTRAVENOUS at 14:11

## 2019-08-18 RX ADMIN — FENTANYL CITRATE 25 MCG: 50 INJECTION, SOLUTION INTRAMUSCULAR; INTRAVENOUS at 16:11

## 2019-08-18 RX ADMIN — CEFDINIR 300 MG: 300 CAPSULE ORAL at 14:52

## 2019-08-18 RX ADMIN — LABETALOL HYDROCHLORIDE 20 MG: 5 INJECTION INTRAVENOUS at 15:29

## 2019-08-18 ASSESSMENT — PAIN SCALES - GENERAL
PAINLEVEL_OUTOF10: 8
PAINLEVEL_OUTOF10: 4
PAINLEVEL_OUTOF10: 8

## 2019-08-18 ASSESSMENT — PAIN DESCRIPTION - PAIN TYPE: TYPE: ACUTE PAIN

## 2019-08-18 ASSESSMENT — PAIN DESCRIPTION - ORIENTATION: ORIENTATION: LOWER

## 2019-08-18 ASSESSMENT — PAIN DESCRIPTION - LOCATION: LOCATION: BACK

## 2019-08-18 NOTE — ED NOTES
Gave report to AKASH Benson at [place] [address] in [facility].  She is aware of the prescriptions this pt will be coming back to the facility with, the UTI, and the need for an outpatient MRI on her back     Edison Leyden, RN  08/18/19 4541

## 2019-08-18 NOTE — ED NOTES
Called [facility] for this pt to be transported [facility] [place] [facility] [place] [address].  They said they would have the on-call person call the ED back shortly     Haroon Cerrato RN  08/18/19 1505

## 2019-08-19 NOTE — ED NOTES
Received call ada  from [employer] for pt transport to [place], ada will set up transportation and call when transport  service is ready.      Gloria Rao RN  08/18/19 2026

## 2019-08-20 LAB
EKG ATRIAL RATE: 89 BPM
EKG P AXIS: 59 DEGREES
EKG P-R INTERVAL: 184 MS
EKG Q-T INTERVAL: 356 MS
EKG QRS DURATION: 80 MS
EKG QTC CALCULATION (BAZETT): 433 MS
EKG R AXIS: 36 DEGREES
EKG T AXIS: 23 DEGREES
EKG VENTRICULAR RATE: 89 BPM
ORGANISM: ABNORMAL
URINE CULTURE, ROUTINE: ABNORMAL

## 2019-08-20 PROCEDURE — 93010 ELECTROCARDIOGRAM REPORT: CPT | Performed by: INTERNAL MEDICINE

## 2019-08-30 ENCOUNTER — HOSPITAL ENCOUNTER (OUTPATIENT)
Dept: MRI IMAGING | Age: 84
Discharge: HOME | End: 2019-09-01
Payer: COMMERCIAL

## 2019-08-30 DIAGNOSIS — M54.5 LOW BACK PAIN, UNSPECIFIED BACK PAIN LATERALITY, UNSPECIFIED CHRONICITY, WITH SCIATICA PRESENCE UNSPECIFIED: ICD-10-CM

## 2019-08-30 PROCEDURE — 6360000004 HC RX CONTRAST MEDICATION: Performed by: RADIOLOGY

## 2019-08-30 PROCEDURE — 72158 MRI LUMBAR SPINE W/O & W/DYE: CPT

## 2019-08-30 PROCEDURE — A9579 GAD-BASE MR CONTRAST NOS,1ML: HCPCS | Performed by: RADIOLOGY

## 2019-08-30 RX ADMIN — GADOTERIDOL 11 ML: 279.3 INJECTION, SOLUTION INTRAVENOUS at 12:01

## 2019-09-14 ENCOUNTER — OUTSIDE SERVICES (OUTPATIENT)
Dept: PODIATRY | Age: 84
End: 2019-09-14
Payer: COMMERCIAL

## 2019-09-14 DIAGNOSIS — R26.2 DIFFICULTY WALKING: ICD-10-CM

## 2019-09-14 DIAGNOSIS — E11.9 TYPE 2 DIABETES MELLITUS WITHOUT COMPLICATION, WITH LONG-TERM CURRENT USE OF INSULIN (HCC): ICD-10-CM

## 2019-09-14 DIAGNOSIS — M79.675 PAIN IN LEFT TOE(S): ICD-10-CM

## 2019-09-14 DIAGNOSIS — B35.1 TINEA UNGUIUM: Primary | ICD-10-CM

## 2019-09-14 DIAGNOSIS — M79.674 PAIN IN TOE OF RIGHT FOOT: ICD-10-CM

## 2019-09-14 DIAGNOSIS — Z79.4 TYPE 2 DIABETES MELLITUS WITHOUT COMPLICATION, WITH LONG-TERM CURRENT USE OF INSULIN (HCC): ICD-10-CM

## 2019-09-14 DIAGNOSIS — I73.9 PERIPHERAL VASCULAR DISEASE, UNSPECIFIED (HCC): ICD-10-CM

## 2019-09-14 DIAGNOSIS — L84 CORN OR CALLUS: ICD-10-CM

## 2019-09-14 PROCEDURE — 11721 DEBRIDE NAIL 6 OR MORE: CPT | Performed by: PODIATRIST

## 2019-09-14 PROCEDURE — 11055 PARING/CUTG B9 HYPRKER LES 1: CPT | Performed by: PODIATRIST

## 2019-09-14 NOTE — PROGRESS NOTES
yellowish incurvated causing pain with both shoe gear. Palpation      Dermatologic Exam:  Skin lesion/ulceration no  Skin no  Callus the left foot plantar aspect plantar to the heel there is a diffuse painful callus 4-1/2 x 4-1/2 cm  Musculoskeletal:     1st MPJ ROM normal, Bilateral.  Muscle strength 5/5, Bilateral.  Pain present upon palpation of toenails 1-5, Bilateral. decreased medial longitudinal arch, Bilateral.  Ankle ROM normal,Bilateral.    Dorsally contracted digits absent digits 5, Bilateral.     Vascular: The DVD PVD is noted with absent pulses DP and PT bilaterally    Neurological:  Sensation present to light touch to level of digits, Bilateral.    Foot Exam     Ortho Exam    Assessment:  80 y.o. female with:   1. Tinea unguium    2. Type 2 diabetes mellitus without complication, with long-term current use of insulin ([address]    3. Corn or callus    4. Pain in left toe(s)    5. Pain in toe of right foot    6. Peripheral vascular disease, unspecified ([address]    7. Difficulty walking     No orders of the defined types were placed in this encounter. Plan:   Pt was evaluated and examined. Patient was given personalized discharge instructions. Nails 1-10 were debrided in length and thickness sharply with a nail nipper and  without incident. Pt will follow up in 9 weeks or sooner if any problems arise. Diagnosis was discussed with the pt and all of their questions were answered in detail. Proper foot hygiene and care was discussed with the pt. Patient to check feet daily and contact the office with any questions/problems/concerns. Other comorbidity noted and will be managed by PCP. Pain waiver discussed with patient and confirmed. Debridement of all painful nails was performed with both manual and electrical debridement to prevent infection and/or ulceration. Patient tolerated the debridement well, without any complaints.   Patient was asymptomatic after debridement    9/14/2019    CALLUS

## 2019-09-17 DIAGNOSIS — S32.029D CLOSED FRACTURE OF SECOND LUMBAR VERTEBRA WITH ROUTINE HEALING, UNSPECIFIED FRACTURE MORPHOLOGY, SUBSEQUENT ENCOUNTER: Primary | ICD-10-CM

## 2019-09-27 DIAGNOSIS — S32.029D CLOSED FRACTURE OF SECOND LUMBAR VERTEBRA WITH ROUTINE HEALING, UNSPECIFIED FRACTURE MORPHOLOGY, SUBSEQUENT ENCOUNTER: ICD-10-CM

## 2019-09-30 ENCOUNTER — OFFICE VISIT (OUTPATIENT)
Dept: NEUROSURGERY | Age: 84
End: 2019-09-30
Payer: COMMERCIAL

## 2019-09-30 VITALS — SYSTOLIC BLOOD PRESSURE: 145 MMHG | DIASTOLIC BLOOD PRESSURE: 66 MMHG | HEART RATE: 58 BPM

## 2019-09-30 DIAGNOSIS — S32.029D CLOSED FRACTURE OF SECOND LUMBAR VERTEBRA WITH ROUTINE HEALING, UNSPECIFIED FRACTURE MORPHOLOGY, SUBSEQUENT ENCOUNTER: Primary | ICD-10-CM

## 2019-09-30 PROCEDURE — G8427 DOCREV CUR MEDS BY ELIG CLIN: HCPCS | Performed by: PHYSICIAN ASSISTANT

## 2019-09-30 PROCEDURE — 1090F PRES/ABSN URINE INCON ASSESS: CPT | Performed by: PHYSICIAN ASSISTANT

## 2019-09-30 PROCEDURE — 1123F ACP DISCUSS/DSCN MKR DOCD: CPT | Performed by: PHYSICIAN ASSISTANT

## 2019-09-30 PROCEDURE — 99203 OFFICE O/P NEW LOW 30 MIN: CPT | Performed by: PHYSICIAN ASSISTANT

## 2019-09-30 PROCEDURE — 4040F PNEUMOC VAC/ADMIN/RCVD: CPT | Performed by: PHYSICIAN ASSISTANT

## 2019-09-30 PROCEDURE — 1036F TOBACCO NON-USER: CPT | Performed by: PHYSICIAN ASSISTANT

## 2019-09-30 PROCEDURE — G8420 CALC BMI NORM PARAMETERS: HCPCS | Performed by: PHYSICIAN ASSISTANT

## 2019-09-30 ASSESSMENT — ENCOUNTER SYMPTOMS
ABDOMINAL PAIN: 0
SHORTNESS OF BREATH: 0
BACK PAIN: 1
PHOTOPHOBIA: 0
TROUBLE SWALLOWING: 0

## 2019-09-30 NOTE — PROGRESS NOTES
 Marital status:      Spouse name: Not on file    Number of children: Not on file    Years of education: Not on file    Highest education level: Not on file   Occupational History    Not on file   Social Needs    Financial resource strain: Not on file    Food insecurity:     Worry: Not on file     Inability: Not on file    Transportation needs:     Medical: Not on file     Non-medical: Not on file   Tobacco Use    Smoking status: Never Smoker    Smokeless tobacco: Never Used   Substance and Sexual Activity    Alcohol use: No     Alcohol/week: 0.0 standard drinks    Drug use: No    Sexual activity: Not Currently   Lifestyle    Physical activity:     Days per week: Not on file     Minutes per session: Not on file    Stress: Not on file   Relationships    Social connections:     Talks on phone: Not on file     Gets together: Not on file     Attends Jainism service: Not on file     Active member of club or organization: Not on file     Attends meetings of clubs or organizations: Not on file     Relationship status: Not on file    Intimate partner violence:     Fear of current or ex partner: Not on file     Emotionally abused: Not on file     Physically abused: Not on file     Forced sexual activity: Not on file   Other Topics Concern    Not on file   Social History Narrative    Not on file       Medications:   Current Outpatient Medications   Medication Sig Dispense Refill    senna (SENOKOT) 8.6 MG tablet Take 1 tablet by mouth daily      lactulose (CHRONULAC) 10 GM/15ML solution Take 20 g by mouth daily      Azelastine HCl 137 MCG/SPRAY SOLN 137 mcg by Nasal route daily      Elastic Bandages & Supports (LUMBAR BACK BRACE/SUPPORT PAD) MISC 1 each by Does not apply route daily 1 each 0    LORazepam (ATIVAN) 0.5 MG tablet Take 0.5 mg by mouth every 6 hours as needed for Anxiety. Amna Chick vitamin D 1000 units CAPS Take 2 capsules by mouth daily      loratadine (CLARITIN) 10 MG tablet Take

## 2019-10-07 ENCOUNTER — APPOINTMENT (OUTPATIENT)
Dept: GENERAL RADIOLOGY | Age: 84
End: 2019-10-07
Payer: COMMERCIAL

## 2019-10-07 ENCOUNTER — APPOINTMENT (OUTPATIENT)
Dept: CT IMAGING | Age: 84
End: 2019-10-07
Payer: COMMERCIAL

## 2019-10-07 ENCOUNTER — HOSPITAL ENCOUNTER (EMERGENCY)
Age: 84
Discharge: HOME | End: 2019-10-07
Attending: EMERGENCY MEDICINE
Payer: COMMERCIAL

## 2019-10-07 VITALS
TEMPERATURE: 96.7 F | SYSTOLIC BLOOD PRESSURE: 151 MMHG | RESPIRATION RATE: 16 BRPM | DIASTOLIC BLOOD PRESSURE: 66 MMHG | WEIGHT: 120 LBS | BODY MASS INDEX: 21.26 KG/M2 | OXYGEN SATURATION: 96 % | HEART RATE: 71 BPM
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
## 2019-10-07 PROCEDURE — 73070 X-RAY EXAM OF ELBOW: CPT

## 2019-10-07 PROCEDURE — 70450 CT HEAD/BRAIN W/O DYE: CPT

## 2019-10-07 PROCEDURE — 99284 EMERGENCY DEPT VISIT MOD MDM: CPT

## 2019-10-07 PROCEDURE — 72125 CT NECK SPINE W/O DYE: CPT

## 2019-10-07 PROCEDURE — 73562 X-RAY EXAM OF KNEE 3: CPT

## 2019-10-07 PROCEDURE — 73110 X-RAY EXAM OF WRIST: CPT

## 2019-10-07 PROCEDURE — 73502 X-RAY EXAM HIP UNI 2-3 VIEWS: CPT

## 2019-10-07 ASSESSMENT — ENCOUNTER SYMPTOMS
ALLERGIC/IMMUNOLOGIC NEGATIVE: 1
COUGH: 0
ABDOMINAL PAIN: 0
NAUSEA: 0
BACK PAIN: 0
CHOKING: 0
VOMITING: 0
WHEEZING: 0
BLOOD IN STOOL: 0
SORE THROAT: 0
EYE PAIN: 0
COLOR CHANGE: 0
CONSTIPATION: 0
SHORTNESS OF BREATH: 0
DIARRHEA: 0

## 2019-10-25 ENCOUNTER — OUTSIDE SERVICES (OUTPATIENT)
Dept: PODIATRY | Age: 84
End: 2019-10-25
Payer: COMMERCIAL

## 2019-10-25 DIAGNOSIS — S90.112A CONTUSION OF LEFT GREAT TOE WITHOUT DAMAGE TO NAIL, INITIAL ENCOUNTER: Primary | ICD-10-CM

## 2019-10-25 PROCEDURE — 99307 SBSQ NF CARE SF MDM 10: CPT | Performed by: PODIATRIST

## 2019-11-11 ENCOUNTER — OFFICE VISIT (OUTPATIENT)
Dept: NEUROSURGERY | Age: 84
End: 2019-11-11
Payer: COMMERCIAL

## 2019-11-11 VITALS
WEIGHT: 118 LBS | DIASTOLIC BLOOD PRESSURE: 74 MMHG | HEART RATE: 70 BPM | HEIGHT: 63 IN | BODY MASS INDEX: 20.91 KG/M2 | SYSTOLIC BLOOD PRESSURE: 145 MMHG

## 2019-11-11 DIAGNOSIS — S32.029D CLOSED FRACTURE OF SECOND LUMBAR VERTEBRA WITH ROUTINE HEALING, UNSPECIFIED FRACTURE MORPHOLOGY, SUBSEQUENT ENCOUNTER: Primary | ICD-10-CM

## 2019-11-11 PROCEDURE — 99213 OFFICE O/P EST LOW 20 MIN: CPT | Performed by: PHYSICIAN ASSISTANT

## 2019-11-11 PROCEDURE — 1036F TOBACCO NON-USER: CPT | Performed by: PHYSICIAN ASSISTANT

## 2019-11-11 PROCEDURE — 4040F PNEUMOC VAC/ADMIN/RCVD: CPT | Performed by: PHYSICIAN ASSISTANT

## 2019-11-11 PROCEDURE — G8484 FLU IMMUNIZE NO ADMIN: HCPCS | Performed by: PHYSICIAN ASSISTANT

## 2019-11-11 PROCEDURE — G8420 CALC BMI NORM PARAMETERS: HCPCS | Performed by: PHYSICIAN ASSISTANT

## 2019-11-11 PROCEDURE — 1090F PRES/ABSN URINE INCON ASSESS: CPT | Performed by: PHYSICIAN ASSISTANT

## 2019-11-11 PROCEDURE — G8427 DOCREV CUR MEDS BY ELIG CLIN: HCPCS | Performed by: PHYSICIAN ASSISTANT

## 2019-11-11 PROCEDURE — 1123F ACP DISCUSS/DSCN MKR DOCD: CPT | Performed by: PHYSICIAN ASSISTANT

## 2019-11-14 DIAGNOSIS — S32.029D CLOSED FRACTURE OF SECOND LUMBAR VERTEBRA WITH ROUTINE HEALING, UNSPECIFIED FRACTURE MORPHOLOGY, SUBSEQUENT ENCOUNTER: ICD-10-CM

## 2020-01-01 ENCOUNTER — OUTSIDE SERVICES (OUTPATIENT)
Dept: PODIATRY | Age: 85
End: 2020-01-01
Payer: COMMERCIAL

## 2020-01-01 PROCEDURE — 11721 DEBRIDE NAIL 6 OR MORE: CPT | Performed by: PODIATRIST

## 2020-01-03 ENCOUNTER — OUTSIDE SERVICES (OUTPATIENT)
Dept: PODIATRY | Age: 85
End: 2020-01-03
Payer: COMMERCIAL

## 2020-01-03 PROCEDURE — 11721 DEBRIDE NAIL 6 OR MORE: CPT | Performed by: PODIATRIST

## 2020-01-03 NOTE — PROGRESS NOTES
will be managed by PCP. Pain waiver discussed with patient and confirmed. Debridement of all painful nails was performed with both manual and electrical debridement to prevent infection and/or ulceration. Patient tolerated the debridement well, without any complaints.   Patient was asymptomatic after debridement    1/3/2020    Electronically signed by Nita Gibbs DPM on 1/3/2020 at 9:30 AM  1/3/2020

## 2020-03-06 ENCOUNTER — OUTSIDE SERVICES (OUTPATIENT)
Dept: PODIATRY | Age: 85
End: 2020-03-06
Payer: COMMERCIAL

## 2020-03-06 PROCEDURE — 11055 PARING/CUTG B9 HYPRKER LES 1: CPT | Performed by: PODIATRIST

## 2020-03-06 PROCEDURE — 11721 DEBRIDE NAIL 6 OR MORE: CPT | Performed by: PODIATRIST

## 2020-03-06 NOTE — PROGRESS NOTES
incurvated causing pain with both shoe gear. Palpation      Dermatologic Exam:  Skin lesion/ulceration no  Skin no  Callus the left foot plantar neg callus   Musculoskeletal:     1st MPJ ROM normal, Bilateral.  Muscle strength 5/5, Bilateral.  Pain present upon palpation of toenails 1-5, Bilateral. decreased medial longitudinal arch, Bilateral.  Ankle ROM normal,Bilateral.    Dorsally contracted digits absent digits 5, Bilateral.     Vascular: The DVD PVD is noted with absent pulses DP and PT bilaterally    Neurological:  Sensation present to light touch to level of digits, Bilateral.    Foot Exam    General  General Appearance: appears stated age and healthy   Orientation: alert and oriented to person, place, and time   Assistance: walker use       Right Foot/Ankle     Inspection and Palpation  Skin Exam: skin changes and abnormal color; Neurovascular  Dorsalis pedis: absent  Posterior tibial: absent      Left Foot/Ankle      Inspection and Palpation  Skin Exam: callus (callus left heel 1x1 cm yellow painful tissue ), skin changes and abnormal color; Neurovascular  Dorsalis pedis: absent  Posterior tibial: absent             Ortho Exam    Assessment:  80 y.o. female with:   1. Tinea unguium    2. Corn or callus    3. Type 2 diabetes mellitus without complication, with long-term current use of insulin (HCC)    4. Pain in toe of right foot    5. Peripheral vascular disease, unspecified ([address]    6. Pain in left toe(s)    7. Difficulty walking     No orders of the defined types were placed in this encounter. Plan: CALLUS DEBRIDEMENT x 1  Verbal informed content was obtained from the patient. The callus lesion on the left foot was debrided with a sterile 15 blade to sub q. It was then debrided,  padded, and an antibiotic ointment was applied to the treated area. Patient tolerated the procedure well with no complications. Pt was evaluated and examined.  Patient was given personalized discharge

## 2020-04-19 ENCOUNTER — HOSPITAL ENCOUNTER (EMERGENCY)
Age: 85
Discharge: HOME | End: 2020-04-19
Attending: EMERGENCY MEDICINE
Payer: COMMERCIAL

## 2020-04-19 ENCOUNTER — APPOINTMENT (OUTPATIENT)
Dept: GENERAL RADIOLOGY | Age: 85
End: 2020-04-19
Payer: COMMERCIAL

## 2020-04-19 VITALS
TEMPERATURE: 98.8 F | HEART RATE: 74 BPM | OXYGEN SATURATION: 93 % | BODY MASS INDEX: 19.49 KG/M2 | RESPIRATION RATE: 16 BRPM | SYSTOLIC BLOOD PRESSURE: 188 MMHG | HEIGHT: 63 IN | DIASTOLIC BLOOD PRESSURE: 105 MMHG | WEIGHT: 110 LBS

## 2020-04-19 LAB
BACTERIA: ABNORMAL /HPF
BILIRUBIN URINE: NEGATIVE
BLOOD, URINE: ABNORMAL
CLARITY: ABNORMAL
COLOR: YELLOW
GLUCOSE URINE: NEGATIVE MG/DL
KETONES, URINE: NEGATIVE MG/DL
LEUKOCYTE ESTERASE, URINE: ABNORMAL
NITRITE, URINE: POSITIVE
PH UA: 7.5 (ref 5–9)
PROTEIN UA: 30 MG/DL
RBC UA: ABNORMAL /HPF (ref 0–2)
SPECIFIC GRAVITY UA: 1.02 (ref 1–1.03)
UROBILINOGEN, URINE: 0.2 E.U./DL
WBC UA: >20 /HPF (ref 0–5)

## 2020-04-19 PROCEDURE — 6370000000 HC RX 637 (ALT 250 FOR IP): Performed by: EMERGENCY MEDICINE

## 2020-04-19 PROCEDURE — 81001 URINALYSIS AUTO W/SCOPE: CPT

## 2020-04-19 PROCEDURE — 87088 URINE BACTERIA CULTURE: CPT

## 2020-04-19 PROCEDURE — 99284 EMERGENCY DEPT VISIT MOD MDM: CPT

## 2020-04-19 PROCEDURE — 87186 SC STD MICRODIL/AGAR DIL: CPT

## 2020-04-19 PROCEDURE — 71045 X-RAY EXAM CHEST 1 VIEW: CPT

## 2020-04-19 PROCEDURE — 87077 CULTURE AEROBIC IDENTIFY: CPT

## 2020-04-19 RX ORDER — CEFDINIR 300 MG/1
300 CAPSULE ORAL 2 TIMES DAILY
Qty: 14 CAPSULE | Refills: 0 | Status: SHIPPED | OUTPATIENT
Start: 2020-04-19 | End: 2020-04-26

## 2020-04-19 RX ORDER — AZITHROMYCIN 250 MG/1
TABLET, FILM COATED ORAL
Qty: 1 PACKET | Refills: 0 | Status: SHIPPED | OUTPATIENT
Start: 2020-04-19 | End: 2020-04-29

## 2020-04-19 RX ORDER — CEFDINIR 300 MG/1
300 CAPSULE ORAL ONCE
Status: COMPLETED | OUTPATIENT
Start: 2020-04-19 | End: 2020-04-19

## 2020-04-19 RX ADMIN — CEFDINIR 300 MG: 300 CAPSULE ORAL at 14:51

## 2020-04-19 ASSESSMENT — PAIN SCALES - PAIN ASSESSMENT IN ADVANCED DEMENTIA (PAINAD)
BODYLANGUAGE: 0
TOTALSCORE: 0
CONSOLABILITY: 0
FACIALEXPRESSION: 0
NEGVOCALIZATION: 0
BREATHING: 0

## 2020-04-19 ASSESSMENT — PAIN DESCRIPTION - DESCRIPTORS: DESCRIPTORS: ACHING

## 2020-04-19 ASSESSMENT — PAIN DESCRIPTION - LOCATION: LOCATION: GENERALIZED

## 2020-04-19 ASSESSMENT — PAIN DESCRIPTION - PAIN TYPE: TYPE: ACUTE PAIN

## 2020-04-19 NOTE — ED NOTES
Spoke with Gordon Syed at [employer]  [phone] and he advises me trip is approved and he did contact [facility] and they are going to transport patient back to [place] trip ID [id number].      Annette Varela RN  04/19/20 1487

## 2020-04-19 NOTE — ED PROVIDER NOTES
Department of Emergency Medicine   ED  Provider Note  Admit Date/RoomTime: 4/19/2020  1:42 PM  ED Room: 04/04   Chief Complaint:   Other (patient from [facility], facility requesting covid testing)    History of Present Illness   Source of history provided by:  patient and EMS personnel. History/Exam Limitations: dementia. Joce Russell is a 80 y.o. old female who has a past medical Hx of:   Past Medical History:   Diagnosis Date    Bipolar affective disorder, current episode manic with psychotic symptoms ([facility] [address] 8/26/2017    Cancer ([facility] [address]     Chronic right-sided low back pain with right-sided sciatica 11/13/2018    Dementia ([facility] [address]     Depression     Diabetes mellitus ([facility] [address]     Dysphagia     Glaucoma     Macular degeneration     Muscle weakness (generalized)     Primary osteoarthritis of right knee 11/13/2018     Patient is from [address] [place] [address]. She is DNR comfort care. She cannot provide much information as she does have dementia and bipolar disorder. She denies having any symptoms whatsoever and states she feels fine. She denies any chest pain, shortness of breath or abdominal pain. Patient's nurse Pau did call [facility] of the [place] and spoke with the staff caring for the patient. Reportedly, she had a fever of 100.4 F earlier today and was administered Tylenol. The nurse practitioner in charge of the facility decided to send the patient in to the ED for COVID testing. Audrey STAPLES    Pertinent positives and negatives are stated within HPI, all other systems reviewed and are negative. Past Surgical History:   Procedure Laterality Date    ANKLE FRACTURE SURGERY Left [id number]    APPENDECTOMY      CARDIAC CATHETERIZATION  10/21/2011    Dr. Gricelda Varela UPPER GASTROINTESTINAL ENDOSCOPY  1/13/16    DR Jose Alfredo Dhillon   Social History:  reports that she has never smoked.  She has never used smokeless tobacco. peripheral interstitial pneumonitis in this patient of   advanced age are suspicious for early or mild interstitial pneumonia. ED Course / Medical Decision Making     Medications   cefdinir (OMNICEF) capsule 300 mg (300 mg Oral Given 4/19/20 1451)        Re-Evaluations:  4/19/20      Patients symptoms show no change. Consultations:             None    Procedures:   none    MDM: Patient is DNR comfort care. She was sent in from [place] to the [facility] for fever earlier today. Currently, she is afebrile and has no complaints. Chest x-ray concerning for possible pneumonia and urinalysis positive for UTI. Urine culture obtained. Patient placed on antibiotics. To be discharged back to the nursing home. Advised droplet isolation as they currently have several COVID suspected patients. Counseling:   I have spoken with the patient and discussed todays results, in addition to providing specific details for the plan of care and counseling regarding the diagnosis and prognosis and are agreeable with the plan. Assessment      1. Acute cystitis without hematuria    2. Pneumonia due to organism    3. DNR no code (do not resuscitate)      This patient's ED course included: a personal history and physicial examination    This patient has remained hemodynamically stable during their ED course. Plan   Discharge to nursing home. Patient condition is fair. New Medications     Discharge Medication List as of 4/19/2020  3:05 PM      START taking these medications    Details   azithromycin (ZITHROMAX Z-RHODA) 250 MG tablet TAKE 500MG PO DAY ONE. .. 250MG PO DAY TWO THROUGH FIVE   DISPENSE 6 TABS  NO REFILLS, Disp-1 packet, R-0Print      cefdinir (OMNICEF) 300 MG capsule Take 1 capsule by mouth 2 times daily for 7 days, Disp-14 capsule, R-0Print           Electronically signed by Hunter Maria DO   DD: 4/19/20  **This report was transcribed using voice recognition software.  Every effort was made to ensure

## 2020-04-22 LAB
ORGANISM: ABNORMAL
URINE CULTURE, ROUTINE: ABNORMAL

## 2020-07-17 ENCOUNTER — OUTSIDE SERVICES (OUTPATIENT)
Dept: PODIATRY | Age: 85
End: 2020-07-17
Payer: COMMERCIAL

## 2020-07-17 PROCEDURE — 11721 DEBRIDE NAIL 6 OR MORE: CPT | Performed by: PODIATRIST

## 2020-07-17 PROCEDURE — 99307 SBSQ NF CARE SF MDM 10: CPT | Performed by: PODIATRIST

## 2020-07-17 NOTE — PROGRESS NOTES
[id number] [facility] patient     Chief Complaint   Patient presents with    Toe Pain    Hammer Toe    Bunions    Foot Ulcer     left heel        Subjective: Ulysses Hightower is seen in nursing home  per nursing for foot and nail care. ,callus care   Pt currently has complaint of thickened, painful, elongated nails that he/she cannot manage by themselves. Pt. Relates pain to nails with shoe gear. Pt's primary care physician is Jamesetta Felty, MD.7/2/2020 patient has a new issue left heel. Nurses have been applying a dressing. Past Medical History:   Diagnosis Date    Bipolar affective disorder, current episode manic with psychotic symptoms ([facility] 8/26/2017    Cancer ([facility] [address]     Chronic right-sided low back pain with right-sided sciatica 11/13/2018    Dementia ([facility] [address]     Depression     Diabetes mellitus (HCC)     Dysphagia     Glaucoma     Macular degeneration     Muscle weakness (generalized)     Primary osteoarthritis of right knee 11/13/2018       No Known Allergies  Current Outpatient Medications on File Prior to Visit   Medication Sig Dispense Refill    senna (SENOKOT) 8.6 MG tablet Take 1 tablet by mouth daily      lactulose (CHRONULAC) 10 GM/15ML solution Take 20 g by mouth daily      Azelastine HCl 137 MCG/SPRAY SOLN 137 mcg by Nasal route daily      Elastic Bandages & Supports (LUMBAR BACK BRACE/SUPPORT PAD) MISC 1 each by Does not apply route daily 1 each 0    LORazepam (ATIVAN) 0.5 MG tablet Take 0.5 mg by mouth every 6 hours as needed for Anxiety. [place] vitamin D 1000 units CAPS Take 2 capsules by mouth daily      loratadine (CLARITIN) 10 MG tablet Take 10 mg by mouth daily      melatonin 5 MG TABS tablet Take 5 mg by mouth daily       mirtazapine (REMERON) 30 MG tablet Take 30 mg by mouth nightly      vitamin E 400 UNIT capsule Take 400 Units by mouth daily      diclofenac sodium 1 % GEL Apply 2 g topically 4 times daily      oxybutynin (DITROPAN-XL) 10 MG extended release tablet Take 10 mg by mouth daily      montelukast (SINGULAIR) 10 MG tablet Take 10 mg by mouth nightly      docusate sodium (COLACE) 100 MG capsule Take 100 mg by mouth 2 times daily      rivastigmine (EXELON) 6 MG capsule Take 6 mg by mouth daily       memantine (NAMENDA) 10 MG tablet Take 10 mg by mouth daily       OLANZapine (ZYPREXA) 7.5 MG tablet Take 7.5 mg by mouth nightly       bisacodyl (DULCOLAX) 10 MG suppository Place 10 mg rectally daily as needed for Constipation      FLONASE 50 MCG/ACT nasal spray 1 spray by Nasal route daily (Patient taking differently: 1 spray by Nasal route daily as needed ) 1 Bottle 3    Elastic Bandages & Supports (MEDICAL COMPRESSION STOCKINGS) MISC 1 each by Does not apply route daily as needed 1 each 0    acetaminophen (TYLENOL) 325 MG tablet Take 650 mg by mouth every 6 hours as needed for Pain       No current facility-administered medications on file prior to visit. Review of Systems  Objective:  General: AAO x 3 in NAD.     Derm  Toenail Description  Sites of Onychomycosis Involvement (Check affected area)  [x] [x] [x] [x] [x] [x] [x] [x] [x] [x]  5 4 3 2 1 1 2 3 4 5                          Right                                        Left    Thickness  [x] [x] [x] [x] [x] [x] [x] [x] [x] [x]  5 4 3 2 1 1 2 3 4 5                         Right                                        Left    Dystrophic Changes   [x] [x] [x] [x] [x] [x] [x] [x] [x] [x]  5 4 3 2 1 1 2 3 4 5                         Right                                        Left    Color  [x] [x] [x] [x] [x] [x] [x] [x] [x] [x]  5 4 3 2 1 1 2 3 4 5                          Right                                        Left    Incurvation/Ingrowin   [] [] [] [x] [x] [x] [x] [x] [] []  5 4 3 2 1 1 2 3 4 5                         Right                                        Left    Inflammation/Pain   [x] [x] [x] [x] [x] [x] [x] [x] [x] [x]  5 4 3  2 1 1 2 3 4 5 Right                                        Left      Nails that are described above are all elongated thickened pitting mycotic yellowish incurvated causing pain with both shoe gear. Palpation      Dermatologic Exam:  Skin lesion/ulceration no  Skin no  Callus the left foot plantar neg callus   Musculoskeletal:     1st MPJ ROM normal, Bilateral.  Muscle strength 5/5, Bilateral.  Pain present upon palpation of toenails 1-5, Bilateral. decreased medial longitudinal arch, Bilateral.  Ankle ROM normal,Bilateral.    Dorsally contracted digits absent digits 5, Bilateral.     Vascular: The DVD PVD is noted with absent pulses DP and PT bilaterally    Neurological:  Sensation present to light touch to level of digits, Bilateral.    Foot Exam    General  General Appearance: appears stated age and healthy   Orientation: alert and oriented to person, place, and time   Assistance: walker use       Right Foot/Ankle     Inspection and Palpation  Skin Exam: skin changes and abnormal color; Neurovascular  Dorsalis pedis: absent  Posterior tibial: absent      Left Foot/Ankle      Inspection and Palpation  Skin Exam: callus (callus left heel 1x1 cm yellow painful tissue ), skin changes and abnormal color; Neurovascular  Dorsalis pedis: absent  Posterior tibial: absent    Comments  Left heel posterior posterior plantar aspect there is an ulcer 1 cm circular dry eschar base clean edges       Left Ankle Exam     Comments:  Left heel posterior posterior plantar aspect there is an ulcer 1 cm circular dry eschar base clean edges            Assessment:  80 y.o. female with:   1. Type 1 diabetes mellitus with diabetic heel ulcer ([address]    2. Tinea unguium    3. Type 2 diabetes mellitus without complication, with long-term current use of insulin (HCC)    4. Pain in left toe(s)    5. Peripheral vascular disease, unspecified ([address]    6. Pain in toe of right foot    7.  Difficulty walking     No orders of the defined types were placed

## 2020-10-30 NOTE — PROGRESS NOTES
[id number] [facility] [place] Complaint   Patient presents with    Toe Pain    Foot Ulcer     which is being addressed by NP wound nurse on MOndays every week    Hammer Toe     5th right        Subjective: Sascha Zeng is seen in nursing home  per nursing for foot and nail care. ,callus care   Pt currently has complaint of thickened, painful, elongated nails that he/she cannot manage by themselves. Pt. Relates pain to nails with shoe gear. Pt's primary care physician is Aracely Dutton MD.10/08/2020 patient has a issue left heel. Nurses have been applying a dressing. Past Medical History:   Diagnosis Date    Bipolar affective disorder, current episode manic with psychotic symptoms ([facility] [address] 8/26/2017    Cancer ([facility] [address]     Chronic right-sided low back pain with right-sided sciatica 11/13/2018    Dementia ([facility] [address]     Depression     Diabetes mellitus (HCC)     Dysphagia     Glaucoma     Macular degeneration     Muscle weakness (generalized)     Primary osteoarthritis of right knee 11/13/2018       No Known Allergies  Current Outpatient Medications on File Prior to Visit   Medication Sig Dispense Refill    senna (SENOKOT) 8.6 MG tablet Take 1 tablet by mouth daily      lactulose (CHRONULAC) 10 GM/15ML solution Take 20 g by mouth daily      Azelastine HCl 137 MCG/SPRAY SOLN 137 mcg by Nasal route daily      Elastic Bandages & Supports (LUMBAR BACK BRACE/SUPPORT PAD) MISC 1 each by Does not apply route daily 1 each 0    LORazepam (ATIVAN) 0.5 MG tablet Take 0.5 mg by mouth every 6 hours as needed for Anxiety. [place]  vitamin D 1000 units CAPS Take 2 capsules by mouth daily      loratadine (CLARITIN) 10 MG tablet Take 10 mg by mouth daily      melatonin 5 MG TABS tablet Take 5 mg by mouth daily       mirtazapine (REMERON) 30 MG tablet Take 30 mg by mouth nightly      vitamin E 400 UNIT capsule Take 400 Units by mouth daily      diclofenac sodium 1 % GEL Apply 2 g topically unspecified ([facility]    7. Difficulty walking     No orders of the defined types were placed in this encounter. Plan: Pt was evaluated and examined. Patient was given personalized discharge instructions. Nails 1-10 were debrided in length and thickness sharply with a nail nipper and  without incident. Pt will follow up in 9 weeks or sooner if any problems arise. Diagnosis was discussed with the pt and all of their questions were answered in detail. Proper foot hygiene and care was discussed with the pt. Patient to check feet daily and contact the office with any questions/problems/concerns. Other comorbidity noted and will be managed by PCP. Pain waiver discussed with patient and confirmed. Debridement of all painful nails was performed with both manual and electrical debridement to prevent infection and/or ulceration. Patient tolerated the debridement well, without any complaints.   Patient was asymptomatic after debridement    10/30/2020    Electronically signed by Mary Jurado DPM on 10/30/2020 at 10:36 AM  10/30/2020

## 2021-01-01 ENCOUNTER — ANESTHESIA (OUTPATIENT)
Dept: OPERATING ROOM | Age: 86
DRG: 481 | End: 2021-01-01
Payer: COMMERCIAL

## 2021-01-01 ENCOUNTER — OUTSIDE SERVICES (OUTPATIENT)
Dept: PODIATRY | Age: 86
End: 2021-01-01
Payer: COMMERCIAL

## 2021-01-01 ENCOUNTER — ANESTHESIA EVENT (OUTPATIENT)
Dept: OPERATING ROOM | Age: 86
DRG: 481 | End: 2021-01-01
Payer: COMMERCIAL

## 2021-01-01 ENCOUNTER — APPOINTMENT (OUTPATIENT)
Dept: CT IMAGING | Age: 86
DRG: 534 | End: 2021-01-01
Payer: COMMERCIAL

## 2021-01-01 ENCOUNTER — APPOINTMENT (OUTPATIENT)
Dept: GENERAL RADIOLOGY | Age: 86
DRG: 481 | End: 2021-01-01
Attending: INTERNAL MEDICINE
Payer: COMMERCIAL

## 2021-01-01 ENCOUNTER — APPOINTMENT (OUTPATIENT)
Dept: CT IMAGING | Age: 86
DRG: 481 | End: 2021-01-01
Attending: INTERNAL MEDICINE
Payer: COMMERCIAL

## 2021-01-01 ENCOUNTER — HOSPITAL ENCOUNTER (INPATIENT)
Age: 86
LOS: 1 days | Discharge: ANOTHER HOSPITAL | DRG: 534 | End: 2021-04-09
Attending: INTERNAL MEDICINE | Admitting: INTERNAL MEDICINE
Payer: COMMERCIAL

## 2021-01-01 ENCOUNTER — HOSPITAL ENCOUNTER (INPATIENT)
Age: 86
LOS: 10 days | Discharge: NURSING, REHAB OR LONG-TERM CARE | DRG: 481 | End: 2021-04-19
Attending: INTERNAL MEDICINE | Admitting: INTERNAL MEDICINE
Payer: COMMERCIAL

## 2021-01-01 ENCOUNTER — APPOINTMENT (OUTPATIENT)
Dept: GENERAL RADIOLOGY | Age: 86
DRG: 534 | End: 2021-01-01
Payer: COMMERCIAL

## 2021-01-01 VITALS
TEMPERATURE: 97.9 F | OXYGEN SATURATION: 97 % | WEIGHT: 134.6 LBS | HEART RATE: 59 BPM | RESPIRATION RATE: 16 BRPM | DIASTOLIC BLOOD PRESSURE: 51 MMHG | SYSTOLIC BLOOD PRESSURE: 124 MMHG | HEIGHT: 65 IN | BODY MASS INDEX: 22.42 KG/M2

## 2021-01-01 VITALS
HEART RATE: 63 BPM | WEIGHT: 141 LBS | DIASTOLIC BLOOD PRESSURE: 60 MMHG | TEMPERATURE: 97.7 F | SYSTOLIC BLOOD PRESSURE: 106 MMHG | BODY MASS INDEX: 23.49 KG/M2 | RESPIRATION RATE: 18 BRPM | HEIGHT: 65 IN | OXYGEN SATURATION: 100 %

## 2021-01-01 VITALS — OXYGEN SATURATION: 96 % | TEMPERATURE: 97.2 F | DIASTOLIC BLOOD PRESSURE: 68 MMHG | SYSTOLIC BLOOD PRESSURE: 154 MMHG

## 2021-01-01 DIAGNOSIS — R26.2 DIFFICULTY WALKING: ICD-10-CM

## 2021-01-01 DIAGNOSIS — I73.9 PERIPHERAL VASCULAR DISEASE, UNSPECIFIED (HCC): ICD-10-CM

## 2021-01-01 DIAGNOSIS — L97.409 TYPE 1 DIABETES MELLITUS WITH DIABETIC HEEL ULCER (HCC): ICD-10-CM

## 2021-01-01 DIAGNOSIS — L84 CORN OR CALLUS: ICD-10-CM

## 2021-01-01 DIAGNOSIS — M79.675 PAIN IN LEFT TOE(S): ICD-10-CM

## 2021-01-01 DIAGNOSIS — M79.674 PAIN IN TOE OF RIGHT FOOT: ICD-10-CM

## 2021-01-01 DIAGNOSIS — B35.1 TINEA UNGUIUM: Primary | ICD-10-CM

## 2021-01-01 DIAGNOSIS — S72.441A: Primary | ICD-10-CM

## 2021-01-01 DIAGNOSIS — S72.401A CLOSED FRACTURE OF DISTAL END OF RIGHT FEMUR, UNSPECIFIED FRACTURE MORPHOLOGY, INITIAL ENCOUNTER (HCC): Primary | ICD-10-CM

## 2021-01-01 DIAGNOSIS — E10.621 TYPE 1 DIABETES MELLITUS WITH DIABETIC HEEL ULCER (HCC): ICD-10-CM

## 2021-01-01 LAB
ABO/RH: NORMAL
ALBUMIN SERPL-MCNC: 2.7 G/DL (ref 3.5–5.2)
ALBUMIN SERPL-MCNC: 3 G/DL (ref 3.5–5.2)
ALBUMIN SERPL-MCNC: 3 G/DL (ref 3.5–5.2)
ALBUMIN SERPL-MCNC: 3.1 G/DL (ref 3.5–5.2)
ALP BLD-CCNC: 27 U/L (ref 35–104)
ALP BLD-CCNC: 29 U/L (ref 35–104)
ALP BLD-CCNC: 38 U/L (ref 35–104)
ALP BLD-CCNC: 43 U/L (ref 35–104)
ALT SERPL-CCNC: 15 U/L (ref 0–32)
ALT SERPL-CCNC: 16 U/L (ref 0–32)
ALT SERPL-CCNC: 21 U/L (ref 0–32)
ALT SERPL-CCNC: <5 U/L (ref 0–32)
AMMONIA: 35 UMOL/L (ref 11–51)
AMMONIA: 39 UMOL/L (ref 11–51)
AMORPHOUS: ABNORMAL
AMORPHOUS: ABNORMAL
ANION GAP SERPL CALCULATED.3IONS-SCNC: 10 MMOL/L (ref 7–16)
ANION GAP SERPL CALCULATED.3IONS-SCNC: 11 MMOL/L (ref 7–16)
ANION GAP SERPL CALCULATED.3IONS-SCNC: 5 MMOL/L (ref 7–16)
ANION GAP SERPL CALCULATED.3IONS-SCNC: 7 MMOL/L (ref 7–16)
ANION GAP SERPL CALCULATED.3IONS-SCNC: 8 MMOL/L (ref 7–16)
ANION GAP SERPL CALCULATED.3IONS-SCNC: 9 MMOL/L (ref 7–16)
ANTIBODY SCREEN: NORMAL
AST SERPL-CCNC: 14 U/L (ref 0–31)
AST SERPL-CCNC: 30 U/L (ref 0–31)
AST SERPL-CCNC: 35 U/L (ref 0–31)
AST SERPL-CCNC: 52 U/L (ref 0–31)
BACTERIA: ABNORMAL /HPF
BASOPHILS ABSOLUTE: 0.02 E9/L (ref 0–0.2)
BASOPHILS ABSOLUTE: 0.02 E9/L (ref 0–0.2)
BASOPHILS ABSOLUTE: 0.03 E9/L (ref 0–0.2)
BASOPHILS ABSOLUTE: 0.03 E9/L (ref 0–0.2)
BASOPHILS ABSOLUTE: 0.04 E9/L (ref 0–0.2)
BASOPHILS ABSOLUTE: 0.05 E9/L (ref 0–0.2)
BASOPHILS RELATIVE PERCENT: 0.2 % (ref 0–2)
BASOPHILS RELATIVE PERCENT: 0.3 % (ref 0–2)
BASOPHILS RELATIVE PERCENT: 0.5 % (ref 0–2)
BASOPHILS RELATIVE PERCENT: 0.6 % (ref 0–2)
BASOPHILS RELATIVE PERCENT: 0.6 % (ref 0–2)
BASOPHILS RELATIVE PERCENT: 0.7 % (ref 0–2)
BILIRUB SERPL-MCNC: 0.3 MG/DL (ref 0–1.2)
BILIRUB SERPL-MCNC: 0.4 MG/DL (ref 0–1.2)
BILIRUB SERPL-MCNC: 0.4 MG/DL (ref 0–1.2)
BILIRUB SERPL-MCNC: 0.6 MG/DL (ref 0–1.2)
BILIRUBIN URINE: ABNORMAL
BILIRUBIN URINE: NEGATIVE
BLOOD BANK DISPENSE STATUS: NORMAL
BLOOD BANK PRODUCT CODE: NORMAL
BLOOD, URINE: ABNORMAL
BLOOD, URINE: ABNORMAL
BLOOD, URINE: NEGATIVE
BLOOD, URINE: NEGATIVE
BPU ID: NORMAL
BUN BLDV-MCNC: 10 MG/DL (ref 6–23)
BUN BLDV-MCNC: 10 MG/DL (ref 6–23)
BUN BLDV-MCNC: 13 MG/DL (ref 8–23)
BUN BLDV-MCNC: 16 MG/DL (ref 8–23)
BUN BLDV-MCNC: 17 MG/DL (ref 8–23)
BUN BLDV-MCNC: 18 MG/DL (ref 8–23)
BUN BLDV-MCNC: 23 MG/DL (ref 8–23)
BUN BLDV-MCNC: 7 MG/DL (ref 8–23)
CALCIUM SERPL-MCNC: 8.2 MG/DL (ref 8.6–10.2)
CALCIUM SERPL-MCNC: 8.5 MG/DL (ref 8.6–10.2)
CALCIUM SERPL-MCNC: 8.7 MG/DL (ref 8.6–10.2)
CALCIUM SERPL-MCNC: 8.9 MG/DL (ref 8.6–10.2)
CALCIUM SERPL-MCNC: 9 MG/DL (ref 8.6–10.2)
CALCIUM SERPL-MCNC: 9.4 MG/DL (ref 8.6–10.2)
CHLORIDE BLD-SCNC: 101 MMOL/L (ref 98–107)
CHLORIDE BLD-SCNC: 102 MMOL/L (ref 98–107)
CHLORIDE BLD-SCNC: 103 MMOL/L (ref 98–107)
CHLORIDE BLD-SCNC: 104 MMOL/L (ref 98–107)
CHLORIDE BLD-SCNC: 105 MMOL/L (ref 98–107)
CHLORIDE BLD-SCNC: 105 MMOL/L (ref 98–107)
CHLORIDE BLD-SCNC: 108 MMOL/L (ref 98–107)
CHLORIDE BLD-SCNC: 99 MMOL/L (ref 98–107)
CLARITY: ABNORMAL
CLARITY: CLEAR
CO2: 23 MMOL/L (ref 22–29)
CO2: 27 MMOL/L (ref 22–29)
CO2: 27 MMOL/L (ref 22–29)
CO2: 28 MMOL/L (ref 22–29)
CO2: 29 MMOL/L (ref 22–29)
CO2: 30 MMOL/L (ref 22–29)
CO2: 31 MMOL/L (ref 22–29)
CO2: 31 MMOL/L (ref 22–29)
COLOR: YELLOW
CREAT SERPL-MCNC: 0.5 MG/DL (ref 0.5–1)
CREAT SERPL-MCNC: 0.5 MG/DL (ref 0.5–1)
CREAT SERPL-MCNC: 0.6 MG/DL (ref 0.5–1)
CRYSTALS, UA: ABNORMAL /HPF
CRYSTALS, UA: ABNORMAL /HPF
DESCRIPTION BLOOD BANK: NORMAL
EKG ATRIAL RATE: 79 BPM
EKG P AXIS: 44 DEGREES
EKG P-R INTERVAL: 220 MS
EKG Q-T INTERVAL: 412 MS
EKG QRS DURATION: 84 MS
EKG QTC CALCULATION (BAZETT): 472 MS
EKG R AXIS: 25 DEGREES
EKG T AXIS: 75 DEGREES
EKG VENTRICULAR RATE: 79 BPM
EOSINOPHILS ABSOLUTE: 0.09 E9/L (ref 0.05–0.5)
EOSINOPHILS ABSOLUTE: 0.17 E9/L (ref 0.05–0.5)
EOSINOPHILS ABSOLUTE: 0.18 E9/L (ref 0.05–0.5)
EOSINOPHILS ABSOLUTE: 0.18 E9/L (ref 0.05–0.5)
EOSINOPHILS ABSOLUTE: 0.23 E9/L (ref 0.05–0.5)
EOSINOPHILS ABSOLUTE: 0.26 E9/L (ref 0.05–0.5)
EOSINOPHILS RELATIVE PERCENT: 1.3 % (ref 0–6)
EOSINOPHILS RELATIVE PERCENT: 1.9 % (ref 0–6)
EOSINOPHILS RELATIVE PERCENT: 2.4 % (ref 0–6)
EOSINOPHILS RELATIVE PERCENT: 3.2 % (ref 0–6)
EOSINOPHILS RELATIVE PERCENT: 3.4 % (ref 0–6)
EOSINOPHILS RELATIVE PERCENT: 4.3 % (ref 0–6)
EPITHELIAL CELLS, UA: ABNORMAL /HPF
EPITHELIAL CELLS, UA: ABNORMAL /HPF
FOLATE: >20 NG/ML (ref 4.8–24.2)
GFR AFRICAN AMERICAN: >60
GFR NON-AFRICAN AMERICAN: >60 ML/MIN/1.73
GLUCOSE BLD-MCNC: 123 MG/DL (ref 74–99)
GLUCOSE BLD-MCNC: 133 MG/DL (ref 74–99)
GLUCOSE BLD-MCNC: 144 MG/DL (ref 74–99)
GLUCOSE BLD-MCNC: 152 MG/DL (ref 74–99)
GLUCOSE BLD-MCNC: 77 MG/DL (ref 74–99)
GLUCOSE BLD-MCNC: 78 MG/DL (ref 74–99)
GLUCOSE BLD-MCNC: 79 MG/DL (ref 74–99)
GLUCOSE BLD-MCNC: 95 MG/DL (ref 74–99)
GLUCOSE URINE: NEGATIVE MG/DL
HCT VFR BLD CALC: 24.6 % (ref 34–48)
HCT VFR BLD CALC: 25.5 % (ref 34–48)
HCT VFR BLD CALC: 32.5 % (ref 34–48)
HCT VFR BLD CALC: 33.1 % (ref 34–48)
HCT VFR BLD CALC: 34 % (ref 34–48)
HCT VFR BLD CALC: 34.1 % (ref 34–48)
HCT VFR BLD CALC: 35.1 % (ref 34–48)
HCT VFR BLD CALC: 35.5 % (ref 34–48)
HCT VFR BLD CALC: 35.8 % (ref 34–48)
HCT VFR BLD CALC: 36.2 % (ref 34–48)
HCT VFR BLD CALC: 45.7 % (ref 34–48)
HEMOGLOBIN: 10.4 G/DL (ref 11.5–15.5)
HEMOGLOBIN: 10.6 G/DL (ref 11.5–15.5)
HEMOGLOBIN: 10.9 G/DL (ref 11.5–15.5)
HEMOGLOBIN: 11.1 G/DL (ref 11.5–15.5)
HEMOGLOBIN: 11.2 G/DL (ref 11.5–15.5)
HEMOGLOBIN: 11.5 G/DL (ref 11.5–15.5)
HEMOGLOBIN: 14.1 G/DL (ref 11.5–15.5)
HEMOGLOBIN: 7.6 G/DL (ref 11.5–15.5)
HEMOGLOBIN: 8.1 G/DL (ref 11.5–15.5)
IMMATURE GRANULOCYTES #: 0.02 E9/L
IMMATURE GRANULOCYTES #: 0.03 E9/L
IMMATURE GRANULOCYTES %: 0.3 % (ref 0–5)
IMMATURE GRANULOCYTES %: 0.4 % (ref 0–5)
IMMATURE GRANULOCYTES %: 0.4 % (ref 0–5)
IMMATURE GRANULOCYTES %: 0.5 % (ref 0–5)
KETONES, URINE: 15 MG/DL
KETONES, URINE: ABNORMAL MG/DL
KETONES, URINE: NEGATIVE MG/DL
KETONES, URINE: NEGATIVE MG/DL
LEUKOCYTE ESTERASE, URINE: ABNORMAL
LYMPHOCYTES ABSOLUTE: 0.96 E9/L (ref 1.5–4)
LYMPHOCYTES ABSOLUTE: 1.51 E9/L (ref 1.5–4)
LYMPHOCYTES ABSOLUTE: 1.88 E9/L (ref 1.5–4)
LYMPHOCYTES ABSOLUTE: 1.9 E9/L (ref 1.5–4)
LYMPHOCYTES ABSOLUTE: 1.96 E9/L (ref 1.5–4)
LYMPHOCYTES ABSOLUTE: 2.9 E9/L (ref 1.5–4)
LYMPHOCYTES RELATIVE PERCENT: 18.1 % (ref 20–42)
LYMPHOCYTES RELATIVE PERCENT: 20.5 % (ref 20–42)
LYMPHOCYTES RELATIVE PERCENT: 26.4 % (ref 20–42)
LYMPHOCYTES RELATIVE PERCENT: 26.5 % (ref 20–42)
LYMPHOCYTES RELATIVE PERCENT: 32.2 % (ref 20–42)
LYMPHOCYTES RELATIVE PERCENT: 33 % (ref 20–42)
MCH RBC QN AUTO: 29.6 PG (ref 26–35)
MCH RBC QN AUTO: 29.8 PG (ref 26–35)
MCH RBC QN AUTO: 29.9 PG (ref 26–35)
MCH RBC QN AUTO: 29.9 PG (ref 26–35)
MCH RBC QN AUTO: 30.2 PG (ref 26–35)
MCH RBC QN AUTO: 30.2 PG (ref 26–35)
MCH RBC QN AUTO: 30.4 PG (ref 26–35)
MCH RBC QN AUTO: 30.5 PG (ref 26–35)
MCH RBC QN AUTO: 30.8 PG (ref 26–35)
MCHC RBC AUTO-ENTMCNC: 30.9 % (ref 32–34.5)
MCHC RBC AUTO-ENTMCNC: 30.9 % (ref 32–34.5)
MCHC RBC AUTO-ENTMCNC: 31.8 % (ref 32–34.5)
MCHC RBC AUTO-ENTMCNC: 32 % (ref 32–34.5)
MCHC RBC AUTO-ENTMCNC: 32.1 % (ref 32–34.5)
MCHC RBC AUTO-ENTMCNC: 32.4 % (ref 32–34.5)
MCHC RBC AUTO-ENTMCNC: 32.6 % (ref 32–34.5)
MCV RBC AUTO: 92.7 FL (ref 80–99.9)
MCV RBC AUTO: 93.2 FL (ref 80–99.9)
MCV RBC AUTO: 94.5 FL (ref 80–99.9)
MCV RBC AUTO: 95.7 FL (ref 80–99.9)
MCV RBC AUTO: 96 FL (ref 80–99.9)
MCV RBC AUTO: 96.2 FL (ref 80–99.9)
MCV RBC AUTO: 97 FL (ref 80–99.9)
MONOCYTES ABSOLUTE: 0.58 E9/L (ref 0.1–0.95)
MONOCYTES ABSOLUTE: 0.65 E9/L (ref 0.1–0.95)
MONOCYTES ABSOLUTE: 0.71 E9/L (ref 0.1–0.95)
MONOCYTES ABSOLUTE: 0.76 E9/L (ref 0.1–0.95)
MONOCYTES ABSOLUTE: 0.77 E9/L (ref 0.1–0.95)
MONOCYTES ABSOLUTE: 1.09 E9/L (ref 0.1–0.95)
MONOCYTES RELATIVE PERCENT: 10.7 % (ref 2–12)
MONOCYTES RELATIVE PERCENT: 10.8 % (ref 2–12)
MONOCYTES RELATIVE PERCENT: 11.7 % (ref 2–12)
MONOCYTES RELATIVE PERCENT: 12.3 % (ref 2–12)
MONOCYTES RELATIVE PERCENT: 12.4 % (ref 2–12)
MONOCYTES RELATIVE PERCENT: 7.9 % (ref 2–12)
NEUTROPHILS ABSOLUTE: 3.1 E9/L (ref 1.8–7.3)
NEUTROPHILS ABSOLUTE: 3.45 E9/L (ref 1.8–7.3)
NEUTROPHILS ABSOLUTE: 4.19 E9/L (ref 1.8–7.3)
NEUTROPHILS ABSOLUTE: 4.33 E9/L (ref 1.8–7.3)
NEUTROPHILS ABSOLUTE: 4.59 E9/L (ref 1.8–7.3)
NEUTROPHILS ABSOLUTE: 5.06 E9/L (ref 1.8–7.3)
NEUTROPHILS RELATIVE PERCENT: 50.8 % (ref 43–80)
NEUTROPHILS RELATIVE PERCENT: 52.2 % (ref 43–80)
NEUTROPHILS RELATIVE PERCENT: 58.8 % (ref 43–80)
NEUTROPHILS RELATIVE PERCENT: 60.4 % (ref 43–80)
NEUTROPHILS RELATIVE PERCENT: 65.2 % (ref 43–80)
NEUTROPHILS RELATIVE PERCENT: 68.5 % (ref 43–80)
NITRITE, URINE: NEGATIVE
ORGANISM: ABNORMAL
PDW BLD-RTO: 13.7 FL (ref 11.5–15)
PDW BLD-RTO: 13.8 FL (ref 11.5–15)
PDW BLD-RTO: 13.8 FL (ref 11.5–15)
PDW BLD-RTO: 14.2 FL (ref 11.5–15)
PDW BLD-RTO: 14.6 FL (ref 11.5–15)
PDW BLD-RTO: 14.6 FL (ref 11.5–15)
PDW BLD-RTO: 14.8 FL (ref 11.5–15)
PDW BLD-RTO: 15.1 FL (ref 11.5–15)
PDW BLD-RTO: 15.2 FL (ref 11.5–15)
PH UA: 6.5 (ref 5–9)
PH UA: 7.5 (ref 5–9)
PH UA: 8 (ref 5–9)
PH UA: 8.5 (ref 5–9)
PLATELET # BLD: 135 E9/L (ref 130–450)
PLATELET # BLD: 161 E9/L (ref 130–450)
PLATELET # BLD: 170 E9/L (ref 130–450)
PLATELET # BLD: 187 E9/L (ref 130–450)
PLATELET # BLD: 196 E9/L (ref 130–450)
PLATELET # BLD: 212 E9/L (ref 130–450)
PLATELET # BLD: 239 E9/L (ref 130–450)
PLATELET # BLD: 239 E9/L (ref 130–450)
PLATELET # BLD: 243 E9/L (ref 130–450)
PMV BLD AUTO: 10 FL (ref 7–12)
PMV BLD AUTO: 10.2 FL (ref 7–12)
PMV BLD AUTO: 10.5 FL (ref 7–12)
PMV BLD AUTO: 10.7 FL (ref 7–12)
PMV BLD AUTO: 11 FL (ref 7–12)
PMV BLD AUTO: 11.1 FL (ref 7–12)
PMV BLD AUTO: 11.1 FL (ref 7–12)
PMV BLD AUTO: 11.6 FL (ref 7–12)
PMV BLD AUTO: 9.4 FL (ref 7–12)
POLYCHROMASIA: ABNORMAL
POTASSIUM SERPL-SCNC: 3.3 MMOL/L (ref 3.5–5)
POTASSIUM SERPL-SCNC: 3.4 MMOL/L (ref 3.5–5)
POTASSIUM SERPL-SCNC: 3.5 MMOL/L (ref 3.5–5)
POTASSIUM SERPL-SCNC: 3.5 MMOL/L (ref 3.5–5)
POTASSIUM SERPL-SCNC: 3.6 MMOL/L (ref 3.5–5)
POTASSIUM SERPL-SCNC: 4.2 MMOL/L (ref 3.5–5)
POTASSIUM SERPL-SCNC: 4.4 MMOL/L (ref 3.5–5)
POTASSIUM SERPL-SCNC: 5 MMOL/L (ref 3.5–5)
PROCALCITONIN: 0.06 NG/ML (ref 0–0.08)
PROTEIN UA: 100 MG/DL
PROTEIN UA: 30 MG/DL
PROTEIN UA: NEGATIVE MG/DL
PROTEIN UA: NEGATIVE MG/DL
RBC # BLD: 2.57 E12/L (ref 3.5–5.5)
RBC # BLD: 3.38 E12/L (ref 3.5–5.5)
RBC # BLD: 3.55 E12/L (ref 3.5–5.5)
RBC # BLD: 3.61 E12/L (ref 3.5–5.5)
RBC # BLD: 3.65 E12/L (ref 3.5–5.5)
RBC # BLD: 3.77 E12/L (ref 3.5–5.5)
RBC # BLD: 3.81 E12/L (ref 3.5–5.5)
RBC # BLD: 3.86 E12/L (ref 3.5–5.5)
RBC # BLD: 4.71 E12/L (ref 3.5–5.5)
RBC UA: ABNORMAL /HPF (ref 0–2)
SARS-COV-2, NAAT: NOT DETECTED
SARS-COV-2, NAAT: NOT DETECTED
SODIUM BLD-SCNC: 135 MMOL/L (ref 132–146)
SODIUM BLD-SCNC: 135 MMOL/L (ref 132–146)
SODIUM BLD-SCNC: 138 MMOL/L (ref 132–146)
SODIUM BLD-SCNC: 140 MMOL/L (ref 132–146)
SODIUM BLD-SCNC: 141 MMOL/L (ref 132–146)
SODIUM BLD-SCNC: 143 MMOL/L (ref 132–146)
SODIUM BLD-SCNC: 143 MMOL/L (ref 132–146)
SODIUM BLD-SCNC: 144 MMOL/L (ref 132–146)
SPECIFIC GRAVITY UA: 1.01 (ref 1–1.03)
SPECIFIC GRAVITY UA: 1.01 (ref 1–1.03)
SPECIFIC GRAVITY UA: 1.02 (ref 1–1.03)
SPECIFIC GRAVITY UA: 1.02 (ref 1–1.03)
T4 TOTAL: 8.4 MCG/DL (ref 4.5–11.7)
TOTAL PROTEIN: 5.6 G/DL (ref 6.4–8.3)
TOTAL PROTEIN: 5.8 G/DL (ref 6.4–8.3)
TOTAL PROTEIN: 6.4 G/DL (ref 6.4–8.3)
TOTAL PROTEIN: 6.9 G/DL (ref 6.4–8.3)
TROPONIN: <0.01 NG/ML (ref 0–0.03)
TSH SERPL DL<=0.05 MIU/L-ACNC: 1.34 UIU/ML (ref 0.27–4.2)
URINE CULTURE, ROUTINE: ABNORMAL
UROBILINOGEN, URINE: 0.2 E.U./DL
UROBILINOGEN, URINE: 1 E.U./DL
UROBILINOGEN, URINE: 1 E.U./DL
UROBILINOGEN, URINE: 2 E.U./DL
VALPROIC ACID LEVEL: 31 MCG/ML (ref 50–100)
VALPROIC ACID LEVEL: 42 MCG/ML (ref 50–100)
VALPROIC ACID LEVEL: 53 MCG/ML (ref 50–100)
VALPROIC ACID LEVEL: 58 MCG/ML (ref 50–100)
VITAMIN B-12: >2000 PG/ML (ref 211–946)
VITAMIN D 25-HYDROXY: 29 NG/ML (ref 30–100)
WBC # BLD: 5.3 E9/L (ref 4.5–11.5)
WBC # BLD: 6.1 E9/L (ref 4.5–11.5)
WBC # BLD: 6.9 E9/L (ref 4.5–11.5)
WBC # BLD: 7.1 E9/L (ref 4.5–11.5)
WBC # BLD: 7.2 E9/L (ref 4.5–11.5)
WBC # BLD: 7.2 E9/L (ref 4.5–11.5)
WBC # BLD: 7.4 E9/L (ref 4.5–11.5)
WBC # BLD: 7.5 E9/L (ref 4.5–11.5)
WBC # BLD: 8.8 E9/L (ref 4.5–11.5)
WBC UA: >20 /HPF (ref 0–5)
WBC UA: >20 /HPF (ref 0–5)
WBC UA: ABNORMAL /HPF (ref 0–5)
WBC UA: ABNORMAL /HPF (ref 0–5)

## 2021-01-01 PROCEDURE — 2580000003 HC RX 258: Performed by: STUDENT IN AN ORGANIZED HEALTH CARE EDUCATION/TRAINING PROGRAM

## 2021-01-01 PROCEDURE — 97530 THERAPEUTIC ACTIVITIES: CPT

## 2021-01-01 PROCEDURE — 2709999900 HC NON-CHARGEABLE SUPPLY: Performed by: ORTHOPAEDIC SURGERY

## 2021-01-01 PROCEDURE — 3600000005 HC SURGERY LEVEL 5 BASE: Performed by: ORTHOPAEDIC SURGERY

## 2021-01-01 PROCEDURE — 97166 OT EVAL MOD COMPLEX 45 MIN: CPT

## 2021-01-01 PROCEDURE — 97112 NEUROMUSCULAR REEDUCATION: CPT

## 2021-01-01 PROCEDURE — 6370000000 HC RX 637 (ALT 250 FOR IP): Performed by: STUDENT IN AN ORGANIZED HEALTH CARE EDUCATION/TRAINING PROGRAM

## 2021-01-01 PROCEDURE — 6360000002 HC RX W HCPCS: Performed by: INTERNAL MEDICINE

## 2021-01-01 PROCEDURE — 6360000002 HC RX W HCPCS: Performed by: STUDENT IN AN ORGANIZED HEALTH CARE EDUCATION/TRAINING PROGRAM

## 2021-01-01 PROCEDURE — 11055 PARING/CUTG B9 HYPRKER LES 1: CPT | Performed by: PODIATRIST

## 2021-01-01 PROCEDURE — G0378 HOSPITAL OBSERVATION PER HR: HCPCS

## 2021-01-01 PROCEDURE — 6370000000 HC RX 637 (ALT 250 FOR IP): Performed by: INTERNAL MEDICINE

## 2021-01-01 PROCEDURE — 96376 TX/PRO/DX INJ SAME DRUG ADON: CPT

## 2021-01-01 PROCEDURE — 87635 SARS-COV-2 COVID-19 AMP PRB: CPT

## 2021-01-01 PROCEDURE — 97162 PT EVAL MOD COMPLEX 30 MIN: CPT

## 2021-01-01 PROCEDURE — 36415 COLL VENOUS BLD VENIPUNCTURE: CPT

## 2021-01-01 PROCEDURE — 96372 THER/PROPH/DIAG INJ SC/IM: CPT

## 2021-01-01 PROCEDURE — 2720000010 HC SURG SUPPLY STERILE: Performed by: ORTHOPAEDIC SURGERY

## 2021-01-01 PROCEDURE — 84145 PROCALCITONIN (PCT): CPT

## 2021-01-01 PROCEDURE — 1200000000 HC SEMI PRIVATE

## 2021-01-01 PROCEDURE — 2700000000 HC OXYGEN THERAPY PER DAY

## 2021-01-01 PROCEDURE — 80048 BASIC METABOLIC PNL TOTAL CA: CPT

## 2021-01-01 PROCEDURE — 2580000003 HC RX 258: Performed by: INTERNAL MEDICINE

## 2021-01-01 PROCEDURE — 93010 ELECTROCARDIOGRAM REPORT: CPT | Performed by: INTERNAL MEDICINE

## 2021-01-01 PROCEDURE — 73700 CT LOWER EXTREMITY W/O DYE: CPT

## 2021-01-01 PROCEDURE — 11721 DEBRIDE NAIL 6 OR MORE: CPT | Performed by: PODIATRIST

## 2021-01-01 PROCEDURE — 85027 COMPLETE CBC AUTOMATED: CPT

## 2021-01-01 PROCEDURE — 85018 HEMOGLOBIN: CPT

## 2021-01-01 PROCEDURE — 80053 COMPREHEN METABOLIC PANEL: CPT

## 2021-01-01 PROCEDURE — 74018 RADEX ABDOMEN 1 VIEW: CPT

## 2021-01-01 PROCEDURE — 85014 HEMATOCRIT: CPT

## 2021-01-01 PROCEDURE — 73030 X-RAY EXAM OF SHOULDER: CPT

## 2021-01-01 PROCEDURE — 99285 EMERGENCY DEPT VISIT HI MDM: CPT

## 2021-01-01 PROCEDURE — P9016 RBC LEUKOCYTES REDUCED: HCPCS

## 2021-01-01 PROCEDURE — 0QS804Z REPOSITION RIGHT FEMORAL SHAFT WITH INTERNAL FIXATION DEVICE, OPEN APPROACH: ICD-10-PCS | Performed by: ORTHOPAEDIC SURGERY

## 2021-01-01 PROCEDURE — 2580000003 HC RX 258: Performed by: NURSE ANESTHETIST, CERTIFIED REGISTERED

## 2021-01-01 PROCEDURE — 73560 X-RAY EXAM OF KNEE 1 OR 2: CPT

## 2021-01-01 PROCEDURE — 97535 SELF CARE MNGMENT TRAINING: CPT

## 2021-01-01 PROCEDURE — 70450 CT HEAD/BRAIN W/O DYE: CPT

## 2021-01-01 PROCEDURE — 93005 ELECTROCARDIOGRAM TRACING: CPT | Performed by: PHYSICIAN ASSISTANT

## 2021-01-01 PROCEDURE — 85025 COMPLETE CBC W/AUTO DIFF WBC: CPT

## 2021-01-01 PROCEDURE — 84484 ASSAY OF TROPONIN QUANT: CPT

## 2021-01-01 PROCEDURE — 73552 X-RAY EXAM OF FEMUR 2/>: CPT

## 2021-01-01 PROCEDURE — 86923 COMPATIBILITY TEST ELECTRIC: CPT

## 2021-01-01 PROCEDURE — G0379 DIRECT REFER HOSPITAL OBSERV: HCPCS

## 2021-01-01 PROCEDURE — 72125 CT NECK SPINE W/O DYE: CPT

## 2021-01-01 PROCEDURE — L1832 KO ADJ JNT POS R SUP PRE CST: HCPCS

## 2021-01-01 PROCEDURE — 72170 X-RAY EXAM OF PELVIS: CPT

## 2021-01-01 PROCEDURE — 3209999900 FLUORO FOR SURGICAL PROCEDURES

## 2021-01-01 PROCEDURE — 3600000015 HC SURGERY LEVEL 5 ADDTL 15MIN: Performed by: ORTHOPAEDIC SURGERY

## 2021-01-01 PROCEDURE — C1713 ANCHOR/SCREW BN/BN,TIS/BN: HCPCS | Performed by: ORTHOPAEDIC SURGERY

## 2021-01-01 PROCEDURE — 96374 THER/PROPH/DIAG INJ IV PUSH: CPT

## 2021-01-01 PROCEDURE — 36430 TRANSFUSION BLD/BLD COMPNT: CPT

## 2021-01-01 PROCEDURE — 2500000003 HC RX 250 WO HCPCS: Performed by: NURSE ANESTHETIST, CERTIFIED REGISTERED

## 2021-01-01 PROCEDURE — 3700000001 HC ADD 15 MINUTES (ANESTHESIA): Performed by: ORTHOPAEDIC SURGERY

## 2021-01-01 PROCEDURE — 96375 TX/PRO/DX INJ NEW DRUG ADDON: CPT

## 2021-01-01 PROCEDURE — 86901 BLOOD TYPING SEROLOGIC RH(D): CPT

## 2021-01-01 PROCEDURE — 81001 URINALYSIS AUTO W/SCOPE: CPT

## 2021-01-01 PROCEDURE — 86850 RBC ANTIBODY SCREEN: CPT

## 2021-01-01 PROCEDURE — 7100000000 HC PACU RECOVERY - FIRST 15 MIN: Performed by: ORTHOPAEDIC SURGERY

## 2021-01-01 PROCEDURE — 6360000002 HC RX W HCPCS: Performed by: NURSE ANESTHETIST, CERTIFIED REGISTERED

## 2021-01-01 PROCEDURE — 71045 X-RAY EXAM CHEST 1 VIEW: CPT

## 2021-01-01 PROCEDURE — 99222 1ST HOSP IP/OBS MODERATE 55: CPT | Performed by: INTERNAL MEDICINE

## 2021-01-01 PROCEDURE — 7100000001 HC PACU RECOVERY - ADDTL 15 MIN: Performed by: ORTHOPAEDIC SURGERY

## 2021-01-01 PROCEDURE — 86900 BLOOD TYPING SEROLOGIC ABO: CPT

## 2021-01-01 PROCEDURE — 3700000000 HC ANESTHESIA ATTENDED CARE: Performed by: ORTHOPAEDIC SURGERY

## 2021-01-01 PROCEDURE — C1769 GUIDE WIRE: HCPCS | Performed by: ORTHOPAEDIC SURGERY

## 2021-01-01 DEVICE — SCREW BNE L30MM DIA5MM CNDYL S STL ST VAR ANG LOK FULL THRD: Type: IMPLANTABLE DEVICE | Status: FUNCTIONAL

## 2021-01-01 DEVICE — PLATE BNE L195MM 8 H ST R CNDYL S STL CRV LOK COMPR VAR ANG: Type: IMPLANTABLE DEVICE | Status: FUNCTIONAL

## 2021-01-01 DEVICE — SCREW BNE L36MM DIA4.5MM PROX CORT TIB S STL ST LOK FULL: Type: IMPLANTABLE DEVICE | Status: FUNCTIONAL

## 2021-01-01 DEVICE — SCREW BNE L65MM DIA5MM CNDYL S STL ST VAR ANG LOK T25: Type: IMPLANTABLE DEVICE | Status: FUNCTIONAL

## 2021-01-01 DEVICE — SCREW BNE L36MM DIA5MM CNDYL S STL ST VAR ANG LOK COMPR T25: Type: IMPLANTABLE DEVICE | Status: FUNCTIONAL

## 2021-01-01 DEVICE — SCREW BNE L44MM DIA5MM CNDYL S STL ST VAR ANG LOK FULL THRD: Type: IMPLANTABLE DEVICE | Status: FUNCTIONAL

## 2021-01-01 DEVICE — SCREW BNE L80MM DIA5MM CNDYL S STL ST VAR ANG LOK T25: Type: IMPLANTABLE DEVICE | Status: FUNCTIONAL

## 2021-01-01 DEVICE — SCREW BNE L75MM DIA5MM CNDYL S STL ST VAR ANG LOK T25: Type: IMPLANTABLE DEVICE | Status: FUNCTIONAL

## 2021-01-01 RX ORDER — OXYCODONE HYDROCHLORIDE AND ACETAMINOPHEN 5; 325 MG/1; MG/1
1 TABLET ORAL EVERY 6 HOURS PRN
Qty: 28 TABLET | Refills: 0 | Status: SHIPPED | OUTPATIENT
Start: 2021-01-01 | End: 2021-01-01

## 2021-01-01 RX ORDER — SODIUM CHLORIDE 0.9 % (FLUSH) 0.9 %
5-40 SYRINGE (ML) INJECTION EVERY 12 HOURS SCHEDULED
Status: CANCELLED | OUTPATIENT
Start: 2021-01-01

## 2021-01-01 RX ORDER — HYDROCODONE BITARTRATE AND ACETAMINOPHEN 5; 325 MG/1; MG/1
2 TABLET ORAL PRN
Status: DISCONTINUED | OUTPATIENT
Start: 2021-01-01 | End: 2021-01-01 | Stop reason: HOSPADM

## 2021-01-01 RX ORDER — HYDROXYZINE PAMOATE 25 MG/1
25 CAPSULE ORAL EVERY 8 HOURS PRN
Status: DISCONTINUED | OUTPATIENT
Start: 2021-01-01 | End: 2021-01-01 | Stop reason: HOSPADM

## 2021-01-01 RX ORDER — LIDOCAINE HYDROCHLORIDE 20 MG/ML
INJECTION, SOLUTION INTRAVENOUS PRN
Status: DISCONTINUED | OUTPATIENT
Start: 2021-01-01 | End: 2021-01-01 | Stop reason: SDUPTHER

## 2021-01-01 RX ORDER — PROMETHAZINE HYDROCHLORIDE 25 MG/1
12.5 TABLET ORAL EVERY 6 HOURS PRN
Status: DISCONTINUED | OUTPATIENT
Start: 2021-01-01 | End: 2021-01-01 | Stop reason: HOSPADM

## 2021-01-01 RX ORDER — DIVALPROEX SODIUM 250 MG/1
250 TABLET, DELAYED RELEASE ORAL 3 TIMES DAILY
COMMUNITY

## 2021-01-01 RX ORDER — ACETAMINOPHEN 325 MG/1
650 TABLET ORAL EVERY 6 HOURS PRN
Status: CANCELLED | OUTPATIENT
Start: 2021-01-01

## 2021-01-01 RX ORDER — SODIUM CHLORIDE 9 MG/ML
INJECTION, SOLUTION INTRAVENOUS PRN
Status: DISCONTINUED | OUTPATIENT
Start: 2021-01-01 | End: 2021-01-01 | Stop reason: HOSPADM

## 2021-01-01 RX ORDER — GABAPENTIN 100 MG/1
100 CAPSULE ORAL 2 TIMES DAILY
COMMUNITY

## 2021-01-01 RX ORDER — ONDANSETRON 2 MG/ML
4 INJECTION INTRAMUSCULAR; INTRAVENOUS EVERY 6 HOURS PRN
Status: CANCELLED | OUTPATIENT
Start: 2021-01-01

## 2021-01-01 RX ORDER — ONDANSETRON 2 MG/ML
4 INJECTION INTRAMUSCULAR; INTRAVENOUS EVERY 6 HOURS PRN
Status: DISCONTINUED | OUTPATIENT
Start: 2021-01-01 | End: 2021-01-01 | Stop reason: HOSPADM

## 2021-01-01 RX ORDER — DIVALPROEX SODIUM 250 MG/1
250 TABLET, DELAYED RELEASE ORAL 3 TIMES DAILY
Status: DISCONTINUED | OUTPATIENT
Start: 2021-01-01 | End: 2021-01-01 | Stop reason: HOSPADM

## 2021-01-01 RX ORDER — ONDANSETRON 4 MG/1
4 TABLET, FILM COATED ORAL EVERY 8 HOURS PRN
COMMUNITY

## 2021-01-01 RX ORDER — MORPHINE SULFATE 2 MG/ML
2 INJECTION, SOLUTION INTRAMUSCULAR; INTRAVENOUS EVERY 4 HOURS PRN
Status: DISCONTINUED | OUTPATIENT
Start: 2021-01-01 | End: 2021-01-01 | Stop reason: HOSPADM

## 2021-01-01 RX ORDER — ACETAMINOPHEN 650 MG/1
650 SUPPOSITORY RECTAL EVERY 6 HOURS PRN
Status: CANCELLED | OUTPATIENT
Start: 2021-01-01

## 2021-01-01 RX ORDER — MONTELUKAST SODIUM 10 MG/1
10 TABLET ORAL NIGHTLY
Status: CANCELLED | OUTPATIENT
Start: 2021-01-01

## 2021-01-01 RX ORDER — SENNA PLUS 8.6 MG/1
1 TABLET ORAL DAILY PRN
Status: DISCONTINUED | OUTPATIENT
Start: 2021-01-01 | End: 2021-01-01 | Stop reason: HOSPADM

## 2021-01-01 RX ORDER — FENTANYL CITRATE 50 UG/ML
25 INJECTION, SOLUTION INTRAMUSCULAR; INTRAVENOUS ONCE
Status: DISCONTINUED | OUTPATIENT
Start: 2021-01-01 | End: 2021-01-01 | Stop reason: HOSPADM

## 2021-01-01 RX ORDER — OXYCODONE HYDROCHLORIDE 5 MG/1
5 TABLET ORAL EVERY 4 HOURS PRN
Status: CANCELLED | OUTPATIENT
Start: 2021-01-01

## 2021-01-01 RX ORDER — SODIUM CHLORIDE 0.9 % (FLUSH) 0.9 %
5-40 SYRINGE (ML) INJECTION EVERY 12 HOURS SCHEDULED
Status: DISCONTINUED | OUTPATIENT
Start: 2021-01-01 | End: 2021-01-01 | Stop reason: HOSPADM

## 2021-01-01 RX ORDER — CIPROFLOXACIN 250 MG/1
250 TABLET, FILM COATED ORAL EVERY 12 HOURS SCHEDULED
Qty: 14 TABLET | Refills: 0
Start: 2021-01-01 | End: 2021-01-01

## 2021-01-01 RX ORDER — DEXTROSE MONOHYDRATE 50 MG/ML
100 INJECTION, SOLUTION INTRAVENOUS PRN
Status: CANCELLED | OUTPATIENT
Start: 2021-01-01

## 2021-01-01 RX ORDER — LOPERAMIDE HYDROCHLORIDE 2 MG/1
2 TABLET ORAL 4 TIMES DAILY PRN
COMMUNITY

## 2021-01-01 RX ORDER — HYDROCODONE BITARTRATE AND ACETAMINOPHEN 5; 325 MG/1; MG/1
1 TABLET ORAL EVERY 4 HOURS PRN
Status: DISCONTINUED | OUTPATIENT
Start: 2021-01-01 | End: 2021-01-01 | Stop reason: HOSPADM

## 2021-01-01 RX ORDER — MEMANTINE HYDROCHLORIDE 10 MG/1
10 TABLET ORAL DAILY
Status: CANCELLED | OUTPATIENT
Start: 2021-01-01

## 2021-01-01 RX ORDER — CHOLECALCIFEROL (VITAMIN D3) 125 MCG
5 CAPSULE ORAL DAILY
Status: CANCELLED | OUTPATIENT
Start: 2021-01-01

## 2021-01-01 RX ORDER — FLUTICASONE PROPIONATE 50 MCG
1 SPRAY, SUSPENSION (ML) NASAL DAILY
Status: CANCELLED | OUTPATIENT
Start: 2021-01-01

## 2021-01-01 RX ORDER — POLYETHYLENE GLYCOL 3350 17 G/17G
17 POWDER, FOR SOLUTION ORAL DAILY PRN
Status: CANCELLED | OUTPATIENT
Start: 2021-01-01

## 2021-01-01 RX ORDER — GLYCOPYRROLATE 1 MG/5 ML
SYRINGE (ML) INTRAVENOUS PRN
Status: DISCONTINUED | OUTPATIENT
Start: 2021-01-01 | End: 2021-01-01 | Stop reason: SDUPTHER

## 2021-01-01 RX ORDER — SENNA PLUS 8.6 MG/1
1 TABLET ORAL DAILY
Status: DISCONTINUED | OUTPATIENT
Start: 2021-01-01 | End: 2021-01-01 | Stop reason: HOSPADM

## 2021-01-01 RX ORDER — MONTELUKAST SODIUM 10 MG/1
10 TABLET ORAL NIGHTLY
Status: DISCONTINUED | OUTPATIENT
Start: 2021-01-01 | End: 2021-01-01 | Stop reason: HOSPADM

## 2021-01-01 RX ORDER — SODIUM CHLORIDE 0.9 % (FLUSH) 0.9 %
10 SYRINGE (ML) INJECTION PRN
Status: DISCONTINUED | OUTPATIENT
Start: 2021-01-01 | End: 2021-01-01 | Stop reason: HOSPADM

## 2021-01-01 RX ORDER — SODIUM CHLORIDE 9 MG/ML
25 INJECTION, SOLUTION INTRAVENOUS PRN
Status: DISCONTINUED | OUTPATIENT
Start: 2021-01-01 | End: 2021-01-01 | Stop reason: HOSPADM

## 2021-01-01 RX ORDER — OXYBUTYNIN CHLORIDE 10 MG/1
10 TABLET, EXTENDED RELEASE ORAL DAILY
Status: CANCELLED | OUTPATIENT
Start: 2021-01-01

## 2021-01-01 RX ORDER — SODIUM CHLORIDE 0.9 % (FLUSH) 0.9 %
10 SYRINGE (ML) INJECTION 2 TIMES DAILY
Status: DISCONTINUED | OUTPATIENT
Start: 2021-01-01 | End: 2021-01-01 | Stop reason: HOSPADM

## 2021-01-01 RX ORDER — M-VIT,TX,IRON,MINS/CALC/FOLIC 27MG-0.4MG
1 TABLET ORAL DAILY
COMMUNITY

## 2021-01-01 RX ORDER — NICOTINE POLACRILEX 4 MG
15 LOZENGE BUCCAL PRN
Status: CANCELLED | OUTPATIENT
Start: 2021-01-01

## 2021-01-01 RX ORDER — HYDROCODONE BITARTRATE AND ACETAMINOPHEN 5; 325 MG/1; MG/1
1 TABLET ORAL PRN
Status: DISCONTINUED | OUTPATIENT
Start: 2021-01-01 | End: 2021-01-01 | Stop reason: HOSPADM

## 2021-01-01 RX ORDER — MIRTAZAPINE 15 MG/1
30 TABLET, FILM COATED ORAL NIGHTLY
Status: CANCELLED | OUTPATIENT
Start: 2021-01-01

## 2021-01-01 RX ORDER — MIRTAZAPINE 15 MG/1
22.5 TABLET, FILM COATED ORAL NIGHTLY
Status: DISCONTINUED | OUTPATIENT
Start: 2021-01-01 | End: 2021-01-01 | Stop reason: HOSPADM

## 2021-01-01 RX ORDER — ZINC SULFATE 50(220)MG
50 CAPSULE ORAL DAILY
COMMUNITY

## 2021-01-01 RX ORDER — SODIUM CHLORIDE 9 MG/ML
INJECTION, SOLUTION INTRAVENOUS CONTINUOUS
Status: DISCONTINUED | OUTPATIENT
Start: 2021-01-01 | End: 2021-01-01

## 2021-01-01 RX ORDER — SODIUM CHLORIDE 0.9 % (FLUSH) 0.9 %
10 SYRINGE (ML) INJECTION EVERY 12 HOURS SCHEDULED
Status: DISCONTINUED | OUTPATIENT
Start: 2021-01-01 | End: 2021-01-01 | Stop reason: HOSPADM

## 2021-01-01 RX ORDER — BISACODYL 10 MG
10 SUPPOSITORY, RECTAL RECTAL DAILY PRN
Status: CANCELLED | OUTPATIENT
Start: 2021-01-01

## 2021-01-01 RX ORDER — POTASSIUM CHLORIDE 20 MEQ/1
20 TABLET, EXTENDED RELEASE ORAL ONCE
Status: COMPLETED | OUTPATIENT
Start: 2021-01-01 | End: 2021-01-01

## 2021-01-01 RX ORDER — MEMANTINE HYDROCHLORIDE 10 MG/1
10 TABLET ORAL 2 TIMES DAILY
Status: DISCONTINUED | OUTPATIENT
Start: 2021-01-01 | End: 2021-01-01 | Stop reason: HOSPADM

## 2021-01-01 RX ORDER — CETIRIZINE HYDROCHLORIDE 10 MG/1
10 TABLET ORAL DAILY
Status: CANCELLED | OUTPATIENT
Start: 2021-01-01

## 2021-01-01 RX ORDER — CALCIUM CARBONATE 200(500)MG
1 TABLET,CHEWABLE ORAL 3 TIMES DAILY PRN
COMMUNITY

## 2021-01-01 RX ORDER — MORPHINE SULFATE 2 MG/ML
2 INJECTION, SOLUTION INTRAMUSCULAR; INTRAVENOUS EVERY 5 MIN PRN
Status: DISCONTINUED | OUTPATIENT
Start: 2021-01-01 | End: 2021-01-01 | Stop reason: HOSPADM

## 2021-01-01 RX ORDER — SENNA PLUS 8.6 MG/1
1 TABLET ORAL DAILY
Status: CANCELLED | OUTPATIENT
Start: 2021-01-01

## 2021-01-01 RX ORDER — PROMETHAZINE HYDROCHLORIDE 25 MG/1
12.5 TABLET ORAL EVERY 6 HOURS PRN
Status: CANCELLED | OUTPATIENT
Start: 2021-01-01

## 2021-01-01 RX ORDER — SODIUM CHLORIDE 9 MG/ML
INJECTION, SOLUTION INTRAVENOUS CONTINUOUS PRN
Status: DISCONTINUED | OUTPATIENT
Start: 2021-01-01 | End: 2021-01-01 | Stop reason: SDUPTHER

## 2021-01-01 RX ORDER — PROMETHAZINE HYDROCHLORIDE 25 MG/ML
6.25 INJECTION, SOLUTION INTRAMUSCULAR; INTRAVENOUS EVERY 10 MIN PRN
Status: DISCONTINUED | OUTPATIENT
Start: 2021-01-01 | End: 2021-01-01 | Stop reason: HOSPADM

## 2021-01-01 RX ORDER — ALBUTEROL SULFATE 90 UG/1
2 AEROSOL, METERED RESPIRATORY (INHALATION) EVERY 6 HOURS PRN
COMMUNITY

## 2021-01-01 RX ORDER — DEXTROSE MONOHYDRATE 25 G/50ML
12.5 INJECTION, SOLUTION INTRAVENOUS PRN
Status: CANCELLED | OUTPATIENT
Start: 2021-01-01

## 2021-01-01 RX ORDER — RIVASTIGMINE TARTRATE 3 MG/1
6 CAPSULE ORAL 2 TIMES DAILY
Status: DISCONTINUED | OUTPATIENT
Start: 2021-01-01 | End: 2021-01-01 | Stop reason: HOSPADM

## 2021-01-01 RX ORDER — FENTANYL CITRATE 50 UG/ML
INJECTION, SOLUTION INTRAMUSCULAR; INTRAVENOUS PRN
Status: DISCONTINUED | OUTPATIENT
Start: 2021-01-01 | End: 2021-01-01 | Stop reason: SDUPTHER

## 2021-01-01 RX ORDER — ACETAMINOPHEN 650 MG/1
650 SUPPOSITORY RECTAL EVERY 6 HOURS PRN
Status: DISCONTINUED | OUTPATIENT
Start: 2021-01-01 | End: 2021-01-01 | Stop reason: HOSPADM

## 2021-01-01 RX ORDER — ONDANSETRON 2 MG/ML
INJECTION INTRAMUSCULAR; INTRAVENOUS PRN
Status: DISCONTINUED | OUTPATIENT
Start: 2021-01-01 | End: 2021-01-01 | Stop reason: SDUPTHER

## 2021-01-01 RX ORDER — DOCUSATE SODIUM 100 MG/1
100 CAPSULE, LIQUID FILLED ORAL 2 TIMES DAILY
Status: CANCELLED | OUTPATIENT
Start: 2021-01-01

## 2021-01-01 RX ORDER — LORAZEPAM 0.5 MG/1
0.5 TABLET ORAL EVERY 6 HOURS PRN
Status: CANCELLED | OUTPATIENT
Start: 2021-01-01

## 2021-01-01 RX ORDER — GABAPENTIN 100 MG/1
100 CAPSULE ORAL 2 TIMES DAILY
Status: DISCONTINUED | OUTPATIENT
Start: 2021-01-01 | End: 2021-01-01 | Stop reason: HOSPADM

## 2021-01-01 RX ORDER — LACTULOSE 10 G/15ML
20 SOLUTION ORAL DAILY
Status: CANCELLED | OUTPATIENT
Start: 2021-01-01

## 2021-01-01 RX ORDER — PHENYLEPHRINE HCL IN 0.9% NACL 1 MG/10 ML
SYRINGE (ML) INTRAVENOUS PRN
Status: DISCONTINUED | OUTPATIENT
Start: 2021-01-01 | End: 2021-01-01 | Stop reason: SDUPTHER

## 2021-01-01 RX ORDER — EPHEDRINE SULFATE/0.9% NACL/PF 50 MG/5 ML
SYRINGE (ML) INTRAVENOUS PRN
Status: DISCONTINUED | OUTPATIENT
Start: 2021-01-01 | End: 2021-01-01 | Stop reason: SDUPTHER

## 2021-01-01 RX ORDER — DEXAMETHASONE SODIUM PHOSPHATE 10 MG/ML
INJECTION INTRAMUSCULAR; INTRAVENOUS PRN
Status: DISCONTINUED | OUTPATIENT
Start: 2021-01-01 | End: 2021-01-01 | Stop reason: SDUPTHER

## 2021-01-01 RX ORDER — CEFAZOLIN SODIUM 1 G/3ML
INJECTION, POWDER, FOR SOLUTION INTRAMUSCULAR; INTRAVENOUS PRN
Status: DISCONTINUED | OUTPATIENT
Start: 2021-01-01 | End: 2021-01-01 | Stop reason: SDUPTHER

## 2021-01-01 RX ORDER — PROPOFOL 10 MG/ML
INJECTION, EMULSION INTRAVENOUS PRN
Status: DISCONTINUED | OUTPATIENT
Start: 2021-01-01 | End: 2021-01-01 | Stop reason: SDUPTHER

## 2021-01-01 RX ORDER — MORPHINE SULFATE 2 MG/ML
1 INJECTION, SOLUTION INTRAMUSCULAR; INTRAVENOUS EVERY 5 MIN PRN
Status: DISCONTINUED | OUTPATIENT
Start: 2021-01-01 | End: 2021-01-01 | Stop reason: HOSPADM

## 2021-01-01 RX ORDER — CIPROFLOXACIN 250 MG/1
250 TABLET, FILM COATED ORAL EVERY 12 HOURS SCHEDULED
Status: DISCONTINUED | OUTPATIENT
Start: 2021-01-01 | End: 2021-01-01 | Stop reason: HOSPADM

## 2021-01-01 RX ORDER — POLYETHYLENE GLYCOL 3350 17 G/17G
17 POWDER, FOR SOLUTION ORAL DAILY PRN
Status: DISCONTINUED | OUTPATIENT
Start: 2021-01-01 | End: 2021-01-01 | Stop reason: HOSPADM

## 2021-01-01 RX ORDER — RIVASTIGMINE TARTRATE 3 MG/1
6 CAPSULE ORAL DAILY
Status: CANCELLED | OUTPATIENT
Start: 2021-01-01

## 2021-01-01 RX ORDER — VITAMIN E 268 MG
400 CAPSULE ORAL DAILY
Status: CANCELLED | OUTPATIENT
Start: 2021-01-01

## 2021-01-01 RX ORDER — AZELASTINE HYDROCHLORIDE 137 UG/1
137 SPRAY, METERED NASAL DAILY
Status: CANCELLED | OUTPATIENT
Start: 2021-01-01

## 2021-01-01 RX ORDER — MEPERIDINE HYDROCHLORIDE 25 MG/ML
12.5 INJECTION INTRAMUSCULAR; INTRAVENOUS; SUBCUTANEOUS EVERY 5 MIN PRN
Status: DISCONTINUED | OUTPATIENT
Start: 2021-01-01 | End: 2021-01-01 | Stop reason: HOSPADM

## 2021-01-01 RX ORDER — NEOSTIGMINE METHYLSULFATE 1 MG/ML
INJECTION, SOLUTION INTRAVENOUS PRN
Status: DISCONTINUED | OUTPATIENT
Start: 2021-01-01 | End: 2021-01-01 | Stop reason: SDUPTHER

## 2021-01-01 RX ORDER — SODIUM CHLORIDE 9 MG/ML
25 INJECTION, SOLUTION INTRAVENOUS PRN
Status: CANCELLED | OUTPATIENT
Start: 2021-01-01

## 2021-01-01 RX ORDER — ACETAMINOPHEN 325 MG/1
650 TABLET ORAL EVERY 6 HOURS PRN
Status: DISCONTINUED | OUTPATIENT
Start: 2021-01-01 | End: 2021-01-01 | Stop reason: HOSPADM

## 2021-01-01 RX ORDER — POTASSIUM CHLORIDE 20 MEQ/1
40 TABLET, EXTENDED RELEASE ORAL ONCE
Status: COMPLETED | OUTPATIENT
Start: 2021-01-01 | End: 2021-01-01

## 2021-01-01 RX ORDER — SODIUM CHLORIDE 0.9 % (FLUSH) 0.9 %
5-40 SYRINGE (ML) INJECTION PRN
Status: DISCONTINUED | OUTPATIENT
Start: 2021-01-01 | End: 2021-01-01 | Stop reason: HOSPADM

## 2021-01-01 RX ORDER — ROCURONIUM BROMIDE 10 MG/ML
INJECTION, SOLUTION INTRAVENOUS PRN
Status: DISCONTINUED | OUTPATIENT
Start: 2021-01-01 | End: 2021-01-01 | Stop reason: SDUPTHER

## 2021-01-01 RX ORDER — DOCUSATE SODIUM 100 MG/1
100 CAPSULE, LIQUID FILLED ORAL 2 TIMES DAILY
Status: DISCONTINUED | OUTPATIENT
Start: 2021-01-01 | End: 2021-01-01 | Stop reason: HOSPADM

## 2021-01-01 RX ORDER — I-VITE, TAB 1000-60-2MG (60/BT) 300MCG-200
1 TAB ORAL
COMMUNITY

## 2021-01-01 RX ORDER — SODIUM CHLORIDE 0.9 % (FLUSH) 0.9 %
5-40 SYRINGE (ML) INJECTION PRN
Status: CANCELLED | OUTPATIENT
Start: 2021-01-01

## 2021-01-01 RX ADMIN — SODIUM CHLORIDE, PRESERVATIVE FREE 10 ML: 5 INJECTION INTRAVENOUS at 11:13

## 2021-01-01 RX ADMIN — PROMETHAZINE HYDROCHLORIDE 12.5 MG: 25 TABLET ORAL at 08:10

## 2021-01-01 RX ADMIN — HYDROXYZINE PAMOATE 25 MG: 25 CAPSULE ORAL at 08:32

## 2021-01-01 RX ADMIN — SENNOSIDES 8.6 MG: 8.6 TABLET, FILM COATED ORAL at 08:31

## 2021-01-01 RX ADMIN — SENNOSIDES 8.6 MG: 8.6 TABLET, FILM COATED ORAL at 08:11

## 2021-01-01 RX ADMIN — GABAPENTIN 100 MG: 100 CAPSULE ORAL at 08:30

## 2021-01-01 RX ADMIN — RIVASTIGMINE TARTRATE 6 MG: 3 CAPSULE ORAL at 21:22

## 2021-01-01 RX ADMIN — GABAPENTIN 100 MG: 100 CAPSULE ORAL at 08:27

## 2021-01-01 RX ADMIN — RIVASTIGMINE TARTRATE 6 MG: 3 CAPSULE ORAL at 21:16

## 2021-01-01 RX ADMIN — MIRTAZAPINE 22.5 MG: 15 TABLET, FILM COATED ORAL at 22:21

## 2021-01-01 RX ADMIN — SENNOSIDES 8.6 MG: 8.6 TABLET, FILM COATED ORAL at 07:59

## 2021-01-01 RX ADMIN — DIVALPROEX SODIUM 250 MG: 250 TABLET, DELAYED RELEASE ORAL at 20:44

## 2021-01-01 RX ADMIN — Medication 200 MCG: at 14:41

## 2021-01-01 RX ADMIN — GABAPENTIN 100 MG: 100 CAPSULE ORAL at 16:37

## 2021-01-01 RX ADMIN — HYDROCODONE BITARTRATE AND ACETAMINOPHEN 1 TABLET: 5; 325 TABLET ORAL at 09:17

## 2021-01-01 RX ADMIN — DIVALPROEX SODIUM 250 MG: 250 TABLET, DELAYED RELEASE ORAL at 10:00

## 2021-01-01 RX ADMIN — Medication 5 MG: at 15:45

## 2021-01-01 RX ADMIN — DIVALPROEX SODIUM 250 MG: 250 TABLET, DELAYED RELEASE ORAL at 21:16

## 2021-01-01 RX ADMIN — CEFAZOLIN 2000 MG: 1 INJECTION, POWDER, FOR SOLUTION INTRAMUSCULAR; INTRAVENOUS at 14:53

## 2021-01-01 RX ADMIN — Medication 2 MG: at 00:29

## 2021-01-01 RX ADMIN — MEMANTINE HYDROCHLORIDE 10 MG: 10 TABLET, FILM COATED ORAL at 21:22

## 2021-01-01 RX ADMIN — ACETAMINOPHEN 650 MG: 325 TABLET ORAL at 21:21

## 2021-01-01 RX ADMIN — DOCUSATE SODIUM 100 MG: 100 CAPSULE, LIQUID FILLED ORAL at 20:44

## 2021-01-01 RX ADMIN — RIVASTIGMINE TARTRATE 6 MG: 3 CAPSULE ORAL at 19:58

## 2021-01-01 RX ADMIN — RIVASTIGMINE TARTRATE 6 MG: 3 CAPSULE ORAL at 21:02

## 2021-01-01 RX ADMIN — GABAPENTIN 100 MG: 100 CAPSULE ORAL at 17:46

## 2021-01-01 RX ADMIN — DOCUSATE SODIUM 100 MG: 100 CAPSULE, LIQUID FILLED ORAL at 21:16

## 2021-01-01 RX ADMIN — RIVASTIGMINE TARTRATE 6 MG: 3 CAPSULE ORAL at 09:34

## 2021-01-01 RX ADMIN — CEFAZOLIN 1000 MG: 1 INJECTION, POWDER, FOR SOLUTION INTRAMUSCULAR; INTRAVENOUS at 23:18

## 2021-01-01 RX ADMIN — MONTELUKAST SODIUM 10 MG: 10 TABLET ORAL at 21:02

## 2021-01-01 RX ADMIN — DIVALPROEX SODIUM 250 MG: 250 TABLET, DELAYED RELEASE ORAL at 09:18

## 2021-01-01 RX ADMIN — ENOXAPARIN SODIUM 40 MG: 40 INJECTION SUBCUTANEOUS at 08:00

## 2021-01-01 RX ADMIN — Medication 2 MG: at 16:57

## 2021-01-01 RX ADMIN — RIVASTIGMINE TARTRATE 6 MG: 3 CAPSULE ORAL at 11:06

## 2021-01-01 RX ADMIN — ENOXAPARIN SODIUM 40 MG: 40 INJECTION SUBCUTANEOUS at 11:12

## 2021-01-01 RX ADMIN — RIVASTIGMINE TARTRATE 6 MG: 3 CAPSULE ORAL at 08:27

## 2021-01-01 RX ADMIN — MEMANTINE HYDROCHLORIDE 10 MG: 10 TABLET, FILM COATED ORAL at 21:16

## 2021-01-01 RX ADMIN — ACETAMINOPHEN 650 MG: 325 TABLET ORAL at 02:57

## 2021-01-01 RX ADMIN — DOCUSATE SODIUM 100 MG: 100 CAPSULE, LIQUID FILLED ORAL at 08:21

## 2021-01-01 RX ADMIN — DIVALPROEX SODIUM 250 MG: 250 TABLET, DELAYED RELEASE ORAL at 21:02

## 2021-01-01 RX ADMIN — Medication 0.6 MG: at 16:26

## 2021-01-01 RX ADMIN — GABAPENTIN 100 MG: 100 CAPSULE ORAL at 18:28

## 2021-01-01 RX ADMIN — SODIUM CHLORIDE: 9 INJECTION, SOLUTION INTRAVENOUS at 03:42

## 2021-01-01 RX ADMIN — MEMANTINE HYDROCHLORIDE 10 MG: 10 TABLET, FILM COATED ORAL at 19:59

## 2021-01-01 RX ADMIN — SENNOSIDES 8.6 MG: 8.6 TABLET, FILM COATED ORAL at 08:35

## 2021-01-01 RX ADMIN — OLANZAPINE 7.5 MG: 5 TABLET, FILM COATED ORAL at 08:33

## 2021-01-01 RX ADMIN — CIPROFLOXACIN 250 MG: 250 TABLET ORAL at 21:16

## 2021-01-01 RX ADMIN — HYDROCODONE BITARTRATE AND ACETAMINOPHEN 1 TABLET: 5; 325 TABLET ORAL at 19:58

## 2021-01-01 RX ADMIN — MEMANTINE HYDROCHLORIDE 10 MG: 10 TABLET, FILM COATED ORAL at 20:44

## 2021-01-01 RX ADMIN — OLANZAPINE 7.5 MG: 5 TABLET, FILM COATED ORAL at 08:27

## 2021-01-01 RX ADMIN — MIRTAZAPINE 22.5 MG: 15 TABLET, FILM COATED ORAL at 19:59

## 2021-01-01 RX ADMIN — HYDROXYZINE PAMOATE 25 MG: 25 CAPSULE ORAL at 08:00

## 2021-01-01 RX ADMIN — GABAPENTIN 100 MG: 100 CAPSULE ORAL at 11:08

## 2021-01-01 RX ADMIN — Medication 10 ML: at 08:45

## 2021-01-01 RX ADMIN — ACETAMINOPHEN 650 MG: 325 TABLET ORAL at 22:20

## 2021-01-01 RX ADMIN — POTASSIUM CHLORIDE 40 MEQ: 1500 TABLET, EXTENDED RELEASE ORAL at 18:19

## 2021-01-01 RX ADMIN — DIVALPROEX SODIUM 250 MG: 250 TABLET, DELAYED RELEASE ORAL at 08:23

## 2021-01-01 RX ADMIN — SODIUM CHLORIDE, PRESERVATIVE FREE 10 ML: 5 INJECTION INTRAVENOUS at 08:31

## 2021-01-01 RX ADMIN — MONTELUKAST SODIUM 10 MG: 10 TABLET ORAL at 20:44

## 2021-01-01 RX ADMIN — GABAPENTIN 100 MG: 100 CAPSULE ORAL at 17:00

## 2021-01-01 RX ADMIN — SENNOSIDES 8.6 MG: 8.6 TABLET, FILM COATED ORAL at 08:28

## 2021-01-01 RX ADMIN — DIVALPROEX SODIUM 250 MG: 250 TABLET, DELAYED RELEASE ORAL at 22:21

## 2021-01-01 RX ADMIN — PROPOFOL 50 MG: 10 INJECTION, EMULSION INTRAVENOUS at 14:37

## 2021-01-01 RX ADMIN — PROMETHAZINE HYDROCHLORIDE 12.5 MG: 25 TABLET ORAL at 08:38

## 2021-01-01 RX ADMIN — HYDROCODONE BITARTRATE AND ACETAMINOPHEN 1 TABLET: 5; 325 TABLET ORAL at 21:16

## 2021-01-01 RX ADMIN — CIPROFLOXACIN 250 MG: 250 TABLET ORAL at 08:27

## 2021-01-01 RX ADMIN — MEMANTINE HYDROCHLORIDE 10 MG: 10 TABLET, FILM COATED ORAL at 08:21

## 2021-01-01 RX ADMIN — MEMANTINE HYDROCHLORIDE 10 MG: 10 TABLET, FILM COATED ORAL at 21:21

## 2021-01-01 RX ADMIN — Medication 100 MCG: at 14:58

## 2021-01-01 RX ADMIN — CIPROFLOXACIN 250 MG: 250 TABLET ORAL at 21:15

## 2021-01-01 RX ADMIN — SODIUM CHLORIDE, PRESERVATIVE FREE 10 ML: 5 INJECTION INTRAVENOUS at 08:20

## 2021-01-01 RX ADMIN — MONTELUKAST SODIUM 10 MG: 10 TABLET ORAL at 19:58

## 2021-01-01 RX ADMIN — DIVALPROEX SODIUM 250 MG: 250 TABLET, DELAYED RELEASE ORAL at 19:57

## 2021-01-01 RX ADMIN — DIVALPROEX SODIUM 250 MG: 250 TABLET, DELAYED RELEASE ORAL at 22:11

## 2021-01-01 RX ADMIN — RIVASTIGMINE TARTRATE 6 MG: 3 CAPSULE ORAL at 21:21

## 2021-01-01 RX ADMIN — Medication 200 MCG: at 14:44

## 2021-01-01 RX ADMIN — DIVALPROEX SODIUM 250 MG: 250 TABLET, DELAYED RELEASE ORAL at 08:28

## 2021-01-01 RX ADMIN — OLANZAPINE 7.5 MG: 5 TABLET, FILM COATED ORAL at 09:34

## 2021-01-01 RX ADMIN — DEXAMETHASONE SODIUM PHOSPHATE 10 MG: 10 INJECTION INTRAMUSCULAR; INTRAVENOUS at 14:37

## 2021-01-01 RX ADMIN — MONTELUKAST SODIUM 10 MG: 10 TABLET ORAL at 21:22

## 2021-01-01 RX ADMIN — SODIUM CHLORIDE, PRESERVATIVE FREE 10 ML: 5 INJECTION INTRAVENOUS at 21:23

## 2021-01-01 RX ADMIN — GABAPENTIN 100 MG: 100 CAPSULE ORAL at 08:11

## 2021-01-01 RX ADMIN — MEMANTINE HYDROCHLORIDE 10 MG: 10 TABLET, FILM COATED ORAL at 21:02

## 2021-01-01 RX ADMIN — DIVALPROEX SODIUM 250 MG: 250 TABLET, DELAYED RELEASE ORAL at 14:35

## 2021-01-01 RX ADMIN — DOCUSATE SODIUM 100 MG: 100 CAPSULE, LIQUID FILLED ORAL at 21:22

## 2021-01-01 RX ADMIN — DIVALPROEX SODIUM 250 MG: 250 TABLET, DELAYED RELEASE ORAL at 08:00

## 2021-01-01 RX ADMIN — DIVALPROEX SODIUM 250 MG: 250 TABLET, DELAYED RELEASE ORAL at 13:26

## 2021-01-01 RX ADMIN — OLANZAPINE 7.5 MG: 5 TABLET, FILM COATED ORAL at 08:31

## 2021-01-01 RX ADMIN — RIVASTIGMINE TARTRATE 6 MG: 3 CAPSULE ORAL at 08:32

## 2021-01-01 RX ADMIN — MEMANTINE HYDROCHLORIDE 10 MG: 10 TABLET, FILM COATED ORAL at 08:33

## 2021-01-01 RX ADMIN — HYDROXYZINE PAMOATE 25 MG: 25 CAPSULE ORAL at 15:03

## 2021-01-01 RX ADMIN — DIVALPROEX SODIUM 250 MG: 250 TABLET, DELAYED RELEASE ORAL at 08:11

## 2021-01-01 RX ADMIN — Medication 100 MCG: at 15:54

## 2021-01-01 RX ADMIN — Medication 2 MG: at 05:33

## 2021-01-01 RX ADMIN — ENOXAPARIN SODIUM 40 MG: 40 INJECTION SUBCUTANEOUS at 08:31

## 2021-01-01 RX ADMIN — ENOXAPARIN SODIUM 40 MG: 40 INJECTION SUBCUTANEOUS at 08:19

## 2021-01-01 RX ADMIN — OLANZAPINE 7.5 MG: 5 TABLET, FILM COATED ORAL at 11:08

## 2021-01-01 RX ADMIN — GABAPENTIN 100 MG: 100 CAPSULE ORAL at 09:17

## 2021-01-01 RX ADMIN — DIVALPROEX SODIUM 250 MG: 250 TABLET, DELAYED RELEASE ORAL at 08:29

## 2021-01-01 RX ADMIN — DOCUSATE SODIUM 100 MG: 100 CAPSULE, LIQUID FILLED ORAL at 19:58

## 2021-01-01 RX ADMIN — DOCUSATE SODIUM 100 MG: 100 CAPSULE, LIQUID FILLED ORAL at 11:06

## 2021-01-01 RX ADMIN — POTASSIUM CHLORIDE 20 MEQ: 1500 TABLET, EXTENDED RELEASE ORAL at 15:31

## 2021-01-01 RX ADMIN — PROPOFOL 20 MG: 10 INJECTION, EMULSION INTRAVENOUS at 16:23

## 2021-01-01 RX ADMIN — SODIUM CHLORIDE: 9 INJECTION, SOLUTION INTRAVENOUS at 14:34

## 2021-01-01 RX ADMIN — SENNOSIDES 8.6 MG: 8.6 TABLET, FILM COATED ORAL at 16:57

## 2021-01-01 RX ADMIN — MEMANTINE HYDROCHLORIDE 10 MG: 10 TABLET, FILM COATED ORAL at 08:12

## 2021-01-01 RX ADMIN — GABAPENTIN 100 MG: 100 CAPSULE ORAL at 07:59

## 2021-01-01 RX ADMIN — DIVALPROEX SODIUM 250 MG: 250 TABLET, DELAYED RELEASE ORAL at 12:18

## 2021-01-01 RX ADMIN — DOCUSATE SODIUM 100 MG: 100 CAPSULE, LIQUID FILLED ORAL at 08:45

## 2021-01-01 RX ADMIN — OLANZAPINE 7.5 MG: 5 TABLET, FILM COATED ORAL at 08:00

## 2021-01-01 RX ADMIN — DIVALPROEX SODIUM 250 MG: 250 TABLET, DELAYED RELEASE ORAL at 11:06

## 2021-01-01 RX ADMIN — DIVALPROEX SODIUM 250 MG: 250 TABLET, DELAYED RELEASE ORAL at 09:33

## 2021-01-01 RX ADMIN — GABAPENTIN 100 MG: 100 CAPSULE ORAL at 16:49

## 2021-01-01 RX ADMIN — Medication 5 MG: at 15:12

## 2021-01-01 RX ADMIN — MONTELUKAST SODIUM 10 MG: 10 TABLET ORAL at 22:11

## 2021-01-01 RX ADMIN — RIVASTIGMINE TARTRATE 6 MG: 3 CAPSULE ORAL at 08:22

## 2021-01-01 RX ADMIN — DIVALPROEX SODIUM 250 MG: 250 TABLET, DELAYED RELEASE ORAL at 13:15

## 2021-01-01 RX ADMIN — MEMANTINE HYDROCHLORIDE 10 MG: 10 TABLET, FILM COATED ORAL at 09:34

## 2021-01-01 RX ADMIN — RIVASTIGMINE TARTRATE 6 MG: 3 CAPSULE ORAL at 20:45

## 2021-01-01 RX ADMIN — SODIUM CHLORIDE, PRESERVATIVE FREE 10 ML: 5 INJECTION INTRAVENOUS at 16:58

## 2021-01-01 RX ADMIN — FENTANYL CITRATE 50 MCG: 50 INJECTION, SOLUTION INTRAMUSCULAR; INTRAVENOUS at 14:37

## 2021-01-01 RX ADMIN — ACETAMINOPHEN 650 MG: 650 SUPPOSITORY RECTAL at 08:52

## 2021-01-01 RX ADMIN — MEMANTINE HYDROCHLORIDE 10 MG: 10 TABLET, FILM COATED ORAL at 08:00

## 2021-01-01 RX ADMIN — HYDROCODONE BITARTRATE AND ACETAMINOPHEN 1 TABLET: 5; 325 TABLET ORAL at 21:24

## 2021-01-01 RX ADMIN — MONTELUKAST SODIUM 10 MG: 10 TABLET ORAL at 21:16

## 2021-01-01 RX ADMIN — FENTANYL CITRATE 10 MCG: 50 INJECTION, SOLUTION INTRAMUSCULAR; INTRAVENOUS at 15:39

## 2021-01-01 RX ADMIN — HYDROXYZINE PAMOATE 25 MG: 25 CAPSULE ORAL at 14:58

## 2021-01-01 RX ADMIN — DOCUSATE SODIUM 100 MG: 100 CAPSULE, LIQUID FILLED ORAL at 08:28

## 2021-01-01 RX ADMIN — DOCUSATE SODIUM 100 MG: 100 CAPSULE, LIQUID FILLED ORAL at 09:34

## 2021-01-01 RX ADMIN — Medication 2 MG: at 09:42

## 2021-01-01 RX ADMIN — MONTELUKAST SODIUM 10 MG: 10 TABLET ORAL at 22:22

## 2021-01-01 RX ADMIN — MIRTAZAPINE 22.5 MG: 15 TABLET, FILM COATED ORAL at 21:02

## 2021-01-01 RX ADMIN — ENOXAPARIN SODIUM 40 MG: 40 INJECTION SUBCUTANEOUS at 08:28

## 2021-01-01 RX ADMIN — SODIUM CHLORIDE: 9 INJECTION, SOLUTION INTRAVENOUS at 14:48

## 2021-01-01 RX ADMIN — DIVALPROEX SODIUM 250 MG: 250 TABLET, DELAYED RELEASE ORAL at 15:03

## 2021-01-01 RX ADMIN — MIRTAZAPINE 22.5 MG: 15 TABLET, FILM COATED ORAL at 21:21

## 2021-01-01 RX ADMIN — DIVALPROEX SODIUM 250 MG: 250 TABLET, DELAYED RELEASE ORAL at 14:43

## 2021-01-01 RX ADMIN — GABAPENTIN 100 MG: 100 CAPSULE ORAL at 08:20

## 2021-01-01 RX ADMIN — RIVASTIGMINE TARTRATE 6 MG: 3 CAPSULE ORAL at 08:11

## 2021-01-01 RX ADMIN — Medication 0.4 MG: at 15:45

## 2021-01-01 RX ADMIN — MIRTAZAPINE 22.5 MG: 15 TABLET, FILM COATED ORAL at 21:17

## 2021-01-01 RX ADMIN — MEMANTINE HYDROCHLORIDE 10 MG: 10 TABLET, FILM COATED ORAL at 21:17

## 2021-01-01 RX ADMIN — DOCUSATE SODIUM 100 MG: 100 CAPSULE, LIQUID FILLED ORAL at 08:10

## 2021-01-01 RX ADMIN — Medication 100 MCG: at 15:47

## 2021-01-01 RX ADMIN — GABAPENTIN 100 MG: 100 CAPSULE ORAL at 18:13

## 2021-01-01 RX ADMIN — FENTANYL CITRATE 10 MCG: 50 INJECTION, SOLUTION INTRAMUSCULAR; INTRAVENOUS at 16:42

## 2021-01-01 RX ADMIN — DIVALPROEX SODIUM 250 MG: 250 TABLET, DELAYED RELEASE ORAL at 21:17

## 2021-01-01 RX ADMIN — GABAPENTIN 100 MG: 100 CAPSULE ORAL at 16:56

## 2021-01-01 RX ADMIN — OLANZAPINE 7.5 MG: 5 TABLET, FILM COATED ORAL at 08:30

## 2021-01-01 RX ADMIN — SODIUM CHLORIDE: 9 INJECTION, SOLUTION INTRAVENOUS at 05:33

## 2021-01-01 RX ADMIN — OLANZAPINE 7.5 MG: 5 TABLET, FILM COATED ORAL at 08:11

## 2021-01-01 RX ADMIN — ONDANSETRON HYDROCHLORIDE 4 MG: 2 INJECTION, SOLUTION INTRAMUSCULAR; INTRAVENOUS at 16:22

## 2021-01-01 RX ADMIN — POTASSIUM CHLORIDE 20 MEQ: 1500 TABLET, EXTENDED RELEASE ORAL at 09:31

## 2021-01-01 RX ADMIN — SENNOSIDES 8.6 MG: 8.6 TABLET, FILM COATED ORAL at 08:20

## 2021-01-01 RX ADMIN — CIPROFLOXACIN 250 MG: 250 TABLET ORAL at 21:02

## 2021-01-01 RX ADMIN — LIDOCAINE HYDROCHLORIDE 100 MG: 20 INJECTION, SOLUTION INTRAVENOUS at 14:37

## 2021-01-01 RX ADMIN — RIVASTIGMINE TARTRATE 6 MG: 3 CAPSULE ORAL at 22:21

## 2021-01-01 RX ADMIN — ENOXAPARIN SODIUM 40 MG: 40 INJECTION SUBCUTANEOUS at 21:00

## 2021-01-01 RX ADMIN — DOCUSATE SODIUM 100 MG: 100 CAPSULE, LIQUID FILLED ORAL at 08:30

## 2021-01-01 RX ADMIN — ENOXAPARIN SODIUM 40 MG: 40 INJECTION SUBCUTANEOUS at 09:18

## 2021-01-01 RX ADMIN — Medication 3 MG: at 16:26

## 2021-01-01 RX ADMIN — FENTANYL CITRATE 10 MCG: 50 INJECTION, SOLUTION INTRAMUSCULAR; INTRAVENOUS at 15:36

## 2021-01-01 RX ADMIN — MONTELUKAST SODIUM 10 MG: 10 TABLET ORAL at 21:17

## 2021-01-01 RX ADMIN — SODIUM CHLORIDE, PRESERVATIVE FREE 10 ML: 5 INJECTION INTRAVENOUS at 21:02

## 2021-01-01 RX ADMIN — DOCUSATE SODIUM 100 MG: 100 CAPSULE, LIQUID FILLED ORAL at 08:00

## 2021-01-01 RX ADMIN — ACETAMINOPHEN 650 MG: 325 TABLET ORAL at 21:17

## 2021-01-01 RX ADMIN — ROCURONIUM BROMIDE 30 MG: 10 INJECTION, SOLUTION INTRAVENOUS at 14:37

## 2021-01-01 RX ADMIN — ROCURONIUM BROMIDE 10 MG: 10 INJECTION, SOLUTION INTRAVENOUS at 15:16

## 2021-01-01 RX ADMIN — SODIUM CHLORIDE, PRESERVATIVE FREE 10 ML: 5 INJECTION INTRAVENOUS at 14:48

## 2021-01-01 RX ADMIN — RIVASTIGMINE TARTRATE 6 MG: 3 CAPSULE ORAL at 09:17

## 2021-01-01 RX ADMIN — Medication 100 MCG: at 16:01

## 2021-01-01 RX ADMIN — GABAPENTIN 100 MG: 100 CAPSULE ORAL at 08:31

## 2021-01-01 RX ADMIN — MEMANTINE HYDROCHLORIDE 10 MG: 10 TABLET, FILM COATED ORAL at 22:11

## 2021-01-01 RX ADMIN — Medication 100 MCG: at 14:38

## 2021-01-01 RX ADMIN — GABAPENTIN 100 MG: 100 CAPSULE ORAL at 08:29

## 2021-01-01 RX ADMIN — DIVALPROEX SODIUM 250 MG: 250 TABLET, DELAYED RELEASE ORAL at 21:22

## 2021-01-01 RX ADMIN — DOCUSATE SODIUM 100 MG: 100 CAPSULE, LIQUID FILLED ORAL at 22:21

## 2021-01-01 RX ADMIN — DIVALPROEX SODIUM 250 MG: 250 TABLET, DELAYED RELEASE ORAL at 08:32

## 2021-01-01 RX ADMIN — ENOXAPARIN SODIUM 40 MG: 40 INJECTION SUBCUTANEOUS at 08:30

## 2021-01-01 RX ADMIN — SENNOSIDES 8.6 MG: 8.6 TABLET, FILM COATED ORAL at 09:17

## 2021-01-01 RX ADMIN — MEMANTINE HYDROCHLORIDE 10 MG: 10 TABLET, FILM COATED ORAL at 11:06

## 2021-01-01 RX ADMIN — MIRTAZAPINE 22.5 MG: 15 TABLET, FILM COATED ORAL at 22:10

## 2021-01-01 RX ADMIN — OLANZAPINE 7.5 MG: 5 TABLET, FILM COATED ORAL at 08:21

## 2021-01-01 RX ADMIN — DIVALPROEX SODIUM 250 MG: 250 TABLET, DELAYED RELEASE ORAL at 21:21

## 2021-01-01 RX ADMIN — DOCUSATE SODIUM 100 MG: 100 CAPSULE, LIQUID FILLED ORAL at 21:02

## 2021-01-01 RX ADMIN — MEMANTINE HYDROCHLORIDE 10 MG: 10 TABLET, FILM COATED ORAL at 10:00

## 2021-01-01 RX ADMIN — CIPROFLOXACIN 250 MG: 250 TABLET ORAL at 08:30

## 2021-01-01 RX ADMIN — MEMANTINE HYDROCHLORIDE 10 MG: 10 TABLET, FILM COATED ORAL at 00:11

## 2021-01-01 RX ADMIN — DOCUSATE SODIUM 100 MG: 100 CAPSULE, LIQUID FILLED ORAL at 09:18

## 2021-01-01 RX ADMIN — ACETAMINOPHEN 650 MG: 325 TABLET ORAL at 15:34

## 2021-01-01 RX ADMIN — Medication 10 ML: at 09:00

## 2021-01-01 RX ADMIN — MEMANTINE HYDROCHLORIDE 10 MG: 10 TABLET, FILM COATED ORAL at 09:18

## 2021-01-01 RX ADMIN — OLANZAPINE 7.5 MG: 5 TABLET, FILM COATED ORAL at 09:17

## 2021-01-01 RX ADMIN — Medication 10 ML: at 09:35

## 2021-01-01 RX ADMIN — MEMANTINE HYDROCHLORIDE 10 MG: 10 TABLET, FILM COATED ORAL at 08:27

## 2021-01-01 RX ADMIN — GABAPENTIN 100 MG: 100 CAPSULE ORAL at 09:34

## 2021-01-01 RX ADMIN — MIRTAZAPINE 22.5 MG: 15 TABLET, FILM COATED ORAL at 20:44

## 2021-01-01 RX ADMIN — ENOXAPARIN SODIUM 40 MG: 40 INJECTION SUBCUTANEOUS at 08:10

## 2021-01-01 RX ADMIN — Medication 2 MG: at 03:45

## 2021-01-01 RX ADMIN — RIVASTIGMINE TARTRATE 6 MG: 3 CAPSULE ORAL at 08:34

## 2021-01-01 RX ADMIN — HYDROCODONE BITARTRATE AND ACETAMINOPHEN 1 TABLET: 5; 325 TABLET ORAL at 23:41

## 2021-01-01 RX ADMIN — SENNOSIDES 8.6 MG: 8.6 TABLET, FILM COATED ORAL at 11:09

## 2021-01-01 RX ADMIN — MIRTAZAPINE 22.5 MG: 15 TABLET, FILM COATED ORAL at 21:22

## 2021-01-01 RX ADMIN — RIVASTIGMINE TARTRATE 6 MG: 3 CAPSULE ORAL at 10:00

## 2021-01-01 RX ADMIN — PROMETHAZINE HYDROCHLORIDE 12.5 MG: 25 TABLET ORAL at 17:06

## 2021-01-01 RX ADMIN — CEFAZOLIN 1000 MG: 1 INJECTION, POWDER, FOR SOLUTION INTRAMUSCULAR; INTRAVENOUS at 06:30

## 2021-01-01 RX ADMIN — MIRTAZAPINE 22.5 MG: 15 TABLET, FILM COATED ORAL at 21:16

## 2021-01-01 RX ADMIN — MONTELUKAST SODIUM 10 MG: 10 TABLET ORAL at 21:21

## 2021-01-01 RX ADMIN — CIPROFLOXACIN 250 MG: 250 TABLET ORAL at 08:01

## 2021-01-01 RX ADMIN — HYDROCODONE BITARTRATE AND ACETAMINOPHEN 1 TABLET: 5; 325 TABLET ORAL at 13:13

## 2021-01-01 RX ADMIN — RIVASTIGMINE TARTRATE 6 MG: 3 CAPSULE ORAL at 08:00

## 2021-01-01 RX ADMIN — MEMANTINE HYDROCHLORIDE 10 MG: 10 TABLET, FILM COATED ORAL at 08:29

## 2021-01-01 RX ADMIN — RIVASTIGMINE TARTRATE 6 MG: 3 CAPSULE ORAL at 22:11

## 2021-01-01 RX ADMIN — Medication 2 MG: at 20:28

## 2021-01-01 ASSESSMENT — PAIN SCALES - PAIN ASSESSMENT IN ADVANCED DEMENTIA (PAINAD)
CONSOLABILITY: 2
CONSOLABILITY: 0
TOTALSCORE: 9
CONSOLABILITY: 0
FACIALEXPRESSION: 2
NEGVOCALIZATION: 0
NEGVOCALIZATION: 0
CONSOLABILITY: 0
BREATHING: 0
NEGVOCALIZATION: 0
BODYLANGUAGE: 2
NEGVOCALIZATION: 0
TOTALSCORE: 0
BODYLANGUAGE: 0
CONSOLABILITY: 0
BODYLANGUAGE: 2
NEGVOCALIZATION: 0
FACIALEXPRESSION: 2
TOTALSCORE: 9
TOTALSCORE: 0
BREATHING: 0
NEGVOCALIZATION: 2
BREATHING: 1
CONSOLABILITY: 2
NEGVOCALIZATION: 2
BODYLANGUAGE: 0
FACIALEXPRESSION: 0
BODYLANGUAGE: 0
TOTALSCORE: 1
BREATHING: 0
BODYLANGUAGE: 0
FACIALEXPRESSION: 0
FACIALEXPRESSION: 0
CONSOLABILITY: 0
TOTALSCORE: 0
BODYLANGUAGE: 0
BREATHING: 0
BREATHING: 1
FACIALEXPRESSION: 0
FACIALEXPRESSION: 0
TOTALSCORE: 0
BREATHING: 1

## 2021-01-01 ASSESSMENT — PAIN SCALES - GENERAL
PAINLEVEL_OUTOF10: 10
PAINLEVEL_OUTOF10: 0
PAINLEVEL_OUTOF10: 0
PAINLEVEL_OUTOF10: 8
PAINLEVEL_OUTOF10: 4
PAINLEVEL_OUTOF10: 4
PAINLEVEL_OUTOF10: 0
PAINLEVEL_OUTOF10: 5
PAINLEVEL_OUTOF10: 10
PAINLEVEL_OUTOF10: 6
PAINLEVEL_OUTOF10: 10
PAINLEVEL_OUTOF10: 0
PAINLEVEL_OUTOF10: 3
PAINLEVEL_OUTOF10: 0
PAINLEVEL_OUTOF10: 0
PAINLEVEL_OUTOF10: 3
PAINLEVEL_OUTOF10: 2
PAINLEVEL_OUTOF10: 0
PAINLEVEL_OUTOF10: 7
PAINLEVEL_OUTOF10: 0
PAINLEVEL_OUTOF10: 0
PAINLEVEL_OUTOF10: 5
PAINLEVEL_OUTOF10: 0
PAINLEVEL_OUTOF10: 0
PAINLEVEL_OUTOF10: 5
PAINLEVEL_OUTOF10: 4
PAINLEVEL_OUTOF10: 0
PAINLEVEL_OUTOF10: 10
PAINLEVEL_OUTOF10: 0
PAINLEVEL_OUTOF10: 0
PAINLEVEL_OUTOF10: 4
PAINLEVEL_OUTOF10: 9
PAINLEVEL_OUTOF10: 0
PAINLEVEL_OUTOF10: 3
PAINLEVEL_OUTOF10: 0
PAINLEVEL_OUTOF10: 10
PAINLEVEL_OUTOF10: 9

## 2021-01-01 ASSESSMENT — PULMONARY FUNCTION TESTS
PIF_VALUE: 18
PIF_VALUE: 17
PIF_VALUE: 18
PIF_VALUE: 18
PIF_VALUE: 19
PIF_VALUE: 18
PIF_VALUE: 24
PIF_VALUE: 18
PIF_VALUE: 17
PIF_VALUE: 17
PIF_VALUE: 19
PIF_VALUE: 0
PIF_VALUE: 17
PIF_VALUE: 14
PIF_VALUE: 18
PIF_VALUE: 21
PIF_VALUE: 16
PIF_VALUE: 18
PIF_VALUE: 19
PIF_VALUE: 18
PIF_VALUE: 19
PIF_VALUE: 18
PIF_VALUE: 19
PIF_VALUE: 17
PIF_VALUE: 19
PIF_VALUE: 19
PIF_VALUE: 18
PIF_VALUE: 1
PIF_VALUE: 19
PIF_VALUE: 18
PIF_VALUE: 18
PIF_VALUE: 16
PIF_VALUE: 18
PIF_VALUE: 0
PIF_VALUE: 19
PIF_VALUE: 18
PIF_VALUE: 18
PIF_VALUE: 19
PIF_VALUE: 18
PIF_VALUE: 19
PIF_VALUE: 17
PIF_VALUE: 19
PIF_VALUE: 17
PIF_VALUE: 18
PIF_VALUE: 3
PIF_VALUE: 18
PIF_VALUE: 17
PIF_VALUE: 18
PIF_VALUE: 18
PIF_VALUE: 19
PIF_VALUE: 17
PIF_VALUE: 17
PIF_VALUE: 18
PIF_VALUE: 18
PIF_VALUE: 3
PIF_VALUE: 18

## 2021-01-01 ASSESSMENT — PAIN DESCRIPTION - PAIN TYPE
TYPE: ACUTE PAIN

## 2021-01-01 ASSESSMENT — PAIN - FUNCTIONAL ASSESSMENT
PAIN_FUNCTIONAL_ASSESSMENT: PREVENTS OR INTERFERES SOME ACTIVE ACTIVITIES AND ADLS
PAIN_FUNCTIONAL_ASSESSMENT: PREVENTS OR INTERFERES SOME ACTIVE ACTIVITIES AND ADLS

## 2021-01-01 ASSESSMENT — PAIN DESCRIPTION - FREQUENCY
FREQUENCY: INTERMITTENT
FREQUENCY: INTERMITTENT

## 2021-01-01 ASSESSMENT — PAIN SCALES - WONG BAKER
WONGBAKER_NUMERICALRESPONSE: 6
WONGBAKER_NUMERICALRESPONSE: 0
WONGBAKER_NUMERICALRESPONSE: 6
WONGBAKER_NUMERICALRESPONSE: 0
WONGBAKER_NUMERICALRESPONSE: 0
WONGBAKER_NUMERICALRESPONSE: 6

## 2021-01-01 ASSESSMENT — PAIN DESCRIPTION - PROGRESSION
CLINICAL_PROGRESSION: NOT CHANGED

## 2021-01-01 ASSESSMENT — PAIN DESCRIPTION - ORIENTATION
ORIENTATION: RIGHT

## 2021-01-01 ASSESSMENT — PAIN DESCRIPTION - LOCATION
LOCATION: HIP;KNEE
LOCATION: LEG
LOCATION: LEG

## 2021-01-01 ASSESSMENT — PAIN DESCRIPTION - ONSET: ONSET: ON-GOING

## 2021-01-15 NOTE — PROGRESS NOTES
[id number] [facility] patient     Chief Complaint   Patient presents with    Toe Pain    Callouses       Subjective: Trudi Smith is seen in nursing home  per nursing for foot and nail care. ,callus care   Pt currently has complaint of thickened, painful, elongated nails that he/she cannot manage by themselves. Pt. Relates pain to nails with shoe gear. Pt's primary care physician is Bharath Willard MD.12/16/2020   Past Medical History:   Diagnosis Date    Bipolar affective disorder, current episode manic with psychotic symptoms ([facility] [address] 8/26/2017    Cancer ([facility] [address]     Chronic right-sided low back pain with right-sided sciatica 11/13/2018    Dementia ([facility] [address]     Depression     Diabetes mellitus (HCC)     Dysphagia     Glaucoma     Macular degeneration     Muscle weakness (generalized)     Primary osteoarthritis of right knee 11/13/2018       No Known Allergies  Current Outpatient Medications on File Prior to Visit   Medication Sig Dispense Refill    senna (SENOKOT) 8.6 MG tablet Take 1 tablet by mouth daily      lactulose (CHRONULAC) 10 GM/15ML solution Take 20 g by mouth daily      Azelastine HCl 137 MCG/SPRAY SOLN 137 mcg by Nasal route daily      Elastic Bandages & Supports (LUMBAR BACK BRACE/SUPPORT PAD) MISC 1 each by Does not apply route daily 1 each 0    LORazepam (ATIVAN) 0.5 MG tablet Take 0.5 mg by mouth every 6 hours as needed for Anxiety. [place] [employer] vitamin D 1000 units CAPS Take 2 capsules by mouth daily      loratadine (CLARITIN) 10 MG tablet Take 10 mg by mouth daily      melatonin 5 MG TABS tablet Take 5 mg by mouth daily       mirtazapine (REMERON) 30 MG tablet Take 30 mg by mouth nightly      vitamin E 400 UNIT capsule Take 400 Units by mouth daily      diclofenac sodium 1 % GEL Apply 2 g topically 4 times daily      oxybutynin (DITROPAN-XL) 10 MG extended release tablet Take 10 mg by mouth daily  montelukast (SINGULAIR) 10 MG tablet Take 10 mg by mouth nightly      docusate sodium (COLACE) 100 MG capsule Take 100 mg by mouth 2 times daily      rivastigmine (EXELON) 6 MG capsule Take 6 mg by mouth daily       memantine (NAMENDA) 10 MG tablet Take 10 mg by mouth daily       OLANZapine (ZYPREXA) 7.5 MG tablet Take 7.5 mg by mouth nightly       bisacodyl (DULCOLAX) 10 MG suppository Place 10 mg rectally daily as needed for Constipation      FLONASE 50 MCG/ACT nasal spray 1 spray by Nasal route daily (Patient taking differently: 1 spray by Nasal route daily as needed ) 1 Bottle 3    Elastic Bandages & Supports (MEDICAL COMPRESSION STOCKINGS) MISC 1 each by Does not apply route daily as needed 1 each 0    acetaminophen (TYLENOL) 325 MG tablet Take 650 mg by mouth every 6 hours as needed for Pain       No current facility-administered medications on file prior to visit. Review of Systems  Objective:  General: AAO x 3 in NAD.     Derm  Toenail Description  Sites of Onychomycosis Involvement (Check affected area)  [x] [x] [x] [x] [x] [x] [x] [x] [x] [x]  5 4 3 2 1 1 2 3 4 5                          Right                                        Left    Thickness  [x] [x] [x] [x] [x] [x] [x] [x] [x] [x]  5 4 3 2 1 1 2 3 4 5                         Right                                        Left    Dystrophic Changes   [x] [x] [x] [x] [x] [x] [x] [x] [x] [x]  5 4 3 2 1 1 2 3 4 5                         Right                                        Left    Color  [x] [x] [x] [x] [x] [x] [x] [x] [x] [x]  5 4 3 2 1 1 2 3 4 5                          Right                                        Left    Incurvation/Ingrowin   [] [] [] [x] [x] [x] [x] [x] [] []  5 4 3 2 1 1 2 3 4 5                         Right                                        Left    Inflammation/Pain   [x] [x] [x] [x] [x] [x] [x] [x] [x] [x]  5 4 3  2 1 1 2 3 4 5                         Right                                        Left Nails that are described above are all elongated thickened pitting mycotic yellowish incurvated causing pain with both shoe gear. Palpation      Dermatologic Exam:  Skin lesion/ulceration no  Skin no  Callus the left foot plantar neg callus   Musculoskeletal:     1st MPJ ROM normal, Bilateral.  Muscle strength 5/5, Bilateral.  Pain present upon palpation of toenails 1-5, Bilateral. decreased medial longitudinal arch, Bilateral.  Ankle ROM normal,Bilateral.    Dorsally contracted digits absent digits 5, Bilateral.     Vascular: The DVD PVD is noted with absent pulses DP and PT bilaterally    Neurological:  Sensation present to light touch to level of digits, Bilateral.    Foot Exam    General  General Appearance: appears stated age and healthy   Orientation: alert and oriented to person, place, and time   Assistance: walker use       Right Foot/Ankle     Inspection and Palpation  Skin Exam: skin changes and abnormal color; Neurovascular  Dorsalis pedis: absent  Posterior tibial: absent      Left Foot/Ankle      Inspection and Palpation  Skin Exam: callus (callus left heel 1x1 cm yellow painful tissue ), skin changes and abnormal color; Neurovascular  Dorsalis pedis: absent  Posterior tibial: absent    Comments  Callus sub 1  Left foot        Left Ankle Exam     Comments:  Callus sub 1  Left foot             Assessment:  80 y.o. female with:   1. Tinea unguium    2. Corn or callus    3. Type 1 diabetes mellitus with diabetic heel ulcer (HCC)    4. Pain in left toe(s)    5. Pain in toe of right foot    6. Peripheral vascular disease, unspecified ([address]    7. Difficulty walking     No orders of the defined types were placed in this encounter.         Plan:     CALLUS DEBRIDEMENT x 1

## 2021-04-09 PROBLEM — S72.441A: Status: ACTIVE | Noted: 2021-01-01

## 2021-04-09 PROBLEM — S72.351A: Status: ACTIVE | Noted: 2021-01-01

## 2021-04-09 NOTE — ED NOTES
@[id number] called the access center to see when patient was getting a bed, no bed assignment as of right now     Derick Lopez  04/09/21 0901

## 2021-04-09 NOTE — ED NOTES
Bed: 20  Expected date:   Expected time:   Means of arrival:   Comments:  Ems       Ute Puga RN  04/09/21 [id number]

## 2021-04-09 NOTE — CONSULTS
EXTREMITIES:  Right lower extremity demonstrates swelling and tenderness  about her knee. No obvious skin changes. Sensation and motor is  grossly intact distally. Range of motion of her knee was not tested due  to pain. Left lower extremity demonstrates mild tenderness about the  knee , mild bruising. Range of motion is intact with minimal pain. Leg  is warm and warm and well perfused. Right upper extremity demonstrates  sensation and motor grossly intact with tenderness about her right  shoulder. Range of motion is intact with minimal pain. Arm is warm and  well perfused. Left upper extremity demonstrates sensation and motor  grossly intact with nontender to palpation. Range of motion is intact  without pain. Arm is warm and well perfused. X-ray and CT of the right knee demonstrate distal femur extra-articular  fracture. ASSESSMENT:  An 71-year-old female with right lower femur fracture,  extra-articular. PLAN:  The patient will be admitted to Medicine. Orthopedics was  consulted. She will be nonweightbearing right leg. She will placed in  a knee immobilizer. She will need medical clearance. She will be  n.p.o. We discussed the possible surgery. Of note, greater than 50 minutes was spent on the floor and with the  patient performing history and physical, reviewing labs and imaging, and  discussing plan. Half the time was spent counseling and coordinating  care.         [facility]    D: 04/09/2021 6:40:07       T: 04/09/2021 6:49:29     [email or web address]  Job#: [id number]     Doc#: [id number]    CC:

## 2021-04-09 NOTE — ED PROVIDER NOTES
[facility]  HPI:  4/9/21, Time: 1:19 AM EDT         Teresa Pallas is a 80 y.o. female presenting to the ED for fall, beginning prior to arrival  ago. The complaint has been persistent, moderate in severity, and worsened by  Movement right leg    Patient  comes via EMS from nursing home with unwitnessed fall. Patient was attempting to get out of bed when she fell. She is unsure if she hit her head denies any neck pain. Complains of bilateral leg pain. Patient denies any chest pain palpitations diaphoresis. No abdominal pain. Review of Systems:   A complete review of systems was performed and pertinent positives and negatives are stated within HPI, all other systems reviewed and are negative.          --------------------------------------------- PAST HISTORY ---------------------------------------------  Past Medical History:  has a past medical history of Bipolar affective disorder, current episode manic with psychotic symptoms ([facility] [address] Cancer ([facility] [address] Chronic right-sided low back pain with right-sided sciatica, Dementia ([facility] [address] Depression, Diabetes mellitus ([facility] [address] Dysphagia, Glaucoma, Macular degeneration, Muscle weakness (generalized), and Primary osteoarthritis of right knee. Past Surgical History:  has a past surgical history that includes Hysterectomy; Appendectomy; Tonsillectomy; Cardiac catheterization (10/21/2011); fracture surgery; Upper gastrointestinal endoscopy (1/13/16); Ankle fracture surgery (Left, [id number]); and partial hysterectomy (cervix not removed). Social History:  reports that she has never smoked. She has never used smokeless tobacco. She reports that she does not drink alcohol or use drugs. Family History: family history is not on file. The patients home medications have been reviewed. Allergies: Patient has no known allergies.     -------------------------------------------------- RESULTS -------------------------------------------------  All laboratory and radiology results have been personally reviewed by myself   LABS:  Results for orders placed or performed during the hospital encounter of 04/09/21   CBC Auto Differential   Result Value Ref Range    WBC 7.4 4.5 - 11.5 E9/L    RBC 3.61 3.50 - 5.50 E12/L    Hemoglobin 10.9 (L) 11.5 - 15.5 g/dL    Hematocrit 34.1 34.0 - 48.0 %    MCV 94.5 80.0 - 99.9 fL    MCH 30.2 26.0 - 35.0 pg    MCHC 32.0 32.0 - 34.5 %    RDW 13.8 11.5 - 15.0 fL    Platelets 289 298 - 664 E9/L    MPV 11.0 7.0 - 12.0 fL    Neutrophils % 68.5 43.0 - 80.0 %    Immature Granulocytes % 0.4 0.0 - 5.0 %    Lymphocytes % 20.5 20.0 - 42.0 %    Monocytes % 7.9 2.0 - 12.0 %    Eosinophils % 2.4 0.0 - 6.0 %    Basophils % 0.3 0.0 - 2.0 %    Neutrophils Absolute 5.06 1.80 - 7.30 E9/L    Immature Granulocytes # 0.03 E9/L    Lymphocytes Absolute 1.51 1.50 - 4.00 E9/L    Monocytes Absolute 0.58 0.10 - 0.95 E9/L    Eosinophils Absolute 0.18 0.05 - 0.50 E9/L    Basophils Absolute 0.02 0.00 - 0.20 E9/L    Polychromasia 1+    Comprehensive Metabolic Panel   Result Value Ref Range    Sodium 135 132 - 146 mmol/L    Potassium 4.4 3.5 - 5.0 mmol/L    Chloride 99 98 - 107 mmol/L    CO2 31 (H) 22 - 29 mmol/L    Anion Gap 5 (L) 7 - 16 mmol/L    Glucose 133 (H) 74 - 99 mg/dL    BUN 17 8 - 23 mg/dL    CREATININE 0.6 0.5 - 1.0 mg/dL    GFR Non-African American >60 >=60 mL/min/1.73    GFR African American >60     Calcium 8.9 8.6 - 10.2 mg/dL    Total Protein 6.4 6.4 - 8.3 g/dL    Albumin 3.1 (L) 3.5 - 5.2 g/dL    Total Bilirubin 0.3 0.0 - 1.2 mg/dL    Alkaline Phosphatase 38 35 - 104 U/L    ALT 21 0 - 32 U/L    AST 35 (H) 0 - 31 U/L   Troponin   Result Value Ref Range    Troponin <0.01 0.00 - 0.03 ng/mL       RADIOLOGY:  Interpreted by Radiologist.  XR PELVIS (1-2 VIEWS)   Final Result   1. Comminuted fracture of the distal right femur. On these exams there is   no clear extension to the joint space. 2.  No definite fracture of the pelvis, hips, nor left knee.       3. Osseous mineralization is decreased. 4.  Advanced bilateral knee osteoarthritis. RECOMMENDATION:   Recommend CT scan of the distal right femur/right knee. Negative radiographs   in the other areas should not deter from obtaining cross-sectional imaging if   there is high clinical concern for acute fractures in these other areas. XR FEMUR RIGHT (MIN 2 VIEWS)   Final Result   1. Comminuted fracture of the distal right femur. On these exams there is   no clear extension to the joint space. 2.  No definite fracture of the pelvis, hips, nor left knee. 3.  Osseous mineralization is decreased. 4.  Advanced bilateral knee osteoarthritis. RECOMMENDATION:   Recommend CT scan of the distal right femur/right knee. Negative radiographs   in the other areas should not deter from obtaining cross-sectional imaging if   there is high clinical concern for acute fractures in these other areas. XR KNEE RIGHT (1-2 VIEWS)   Final Result   1. Comminuted fracture of the distal right femur. On these exams there is   no clear extension to the joint space. 2.  No definite fracture of the pelvis, hips, nor left knee. 3.  Osseous mineralization is decreased. 4.  Advanced bilateral knee osteoarthritis. RECOMMENDATION:   Recommend CT scan of the distal right femur/right knee. Negative radiographs   in the other areas should not deter from obtaining cross-sectional imaging if   there is high clinical concern for acute fractures in these other areas. XR KNEE LEFT (1-2 VIEWS)   Final Result   1. Comminuted fracture of the distal right femur. On these exams there is   no clear extension to the joint space. 2.  No definite fracture of the pelvis, hips, nor left knee. 3.  Osseous mineralization is decreased. 4.  Advanced bilateral knee osteoarthritis. RECOMMENDATION:   Recommend CT scan of the distal right femur/right knee.   Negative radiographs   in the other areas should not deter from obtaining cross-sectional imaging if   there is high clinical concern for acute fractures in these other areas. CT HEAD WO CONTRAST   Final Result   No acute intracranial abnormality. No basilar skull nor calvarial fracture. CT CERVICAL SPINE WO CONTRAST   Final Result   No acute abnormality of the cervical spine. Chronic findings as described. CT KNEE RIGHT WO CONTRAST    (Results Pending)       ------------------------- NURSING NOTES AND VITALS REVIEWED ---------------------------   The nursing notes within the ED encounter and vital signs as below have been reviewed. BP (!) 116/43   Pulse 72   Temp 97.7 °F (36.5 °C) (Axillary)   Resp 14   Ht 5' 5\" (1.651 m)   Wt 141 lb (64 kg)   LMP  (LMP Unknown)   SpO2 97%   BMI 23.46 kg/m²   Oxygen Saturation Interpretation: Normal      ---------------------------------------------------PHYSICAL EXAM--------------------------------------      Constitutional/General: Alert   Head: Normocephalic   Eyes: PERRL, EOMI  Mouth: Oropharynx clear, handling secretions, no trismus  Neck: Supple, full ROM, no vertebral point tenderness  Pulmonary: Lungs clear to auscultation bilaterally, no wheezes, rales, or rhonchi. Not in respiratory distress  Cardiovascular:  Regular rate and rhythm, no murmurs, gallops, or rubs. 2+ distal pulses  Abdomen: Soft, non tender, non distended,   Extremities: Moves all extremities x 4. Warm and well perfused tender to palpation of the right hip right knee left knee. No tenderness palpation of the left hip. Skin: warm and dry without rash  Neurologic: GCS 15,  Psych: Normal Affect      ------------------------------ ED COURSE/MEDICAL DECISION MAKING----------------------  Medications   fentaNYL (SUBLIMAZE) injection 25 mcg (has no administration in time range)         ED COURSE:     EKG # 1   Interpreted by emergency department physician unless otherwise noted.   Time: 0226  Rate: 79 bpm  Rhythm: Sinus rhythm. Interpretation: Sinus rhythm with first-degree AV block. Comparison: There are no significant changes when compared to prior tracings. [id number] Dr. Lacie Ramos, voicemail obtained with a message left. [id number] Discussed with Juan Carlos Blum he would like to have patient kept Npo, place in Knee immobilizer and Ct of right knee   [id number] Discussed with Hospitalist he is agreeable to admission       Medical Decision Making:     pt came in for unwitnessed fall. Xray showed distal right femur fracture . Patient admitted to Hospitalist service     Counseling: The emergency provider has spoken with the patient and discussed todays results, in addition to providing specific details for the plan of care and counseling regarding the diagnosis and prognosis. Questions are answered at this time and they are agreeable with the plan.      --------------------------------- IMPRESSION AND DISPOSITION ---------------------------------    IMPRESSION  1. Closed fracture of distal end of right femur, unspecified fracture morphology, initial encounter ([facility] [address]        DISPOSITION  Disposition: Admit to med/surg floor  Patient condition is fair      NOTE: This report was transcribed using voice recognition software.  Every effort was made to ensure accuracy; however, inadvertent computerized transcription errors may be present     MELIDA Woodson  04/09/21 [id number]

## 2021-04-09 NOTE — DISCHARGE SUMMARY
[facility] Physician Discharge Summary       Activity level: Bedrest    Diet: Diet NPO Effective Now      Condition at discharge: Stable    Dispo: [facility]      Patient ID:  Atilio Tse  [id number]  80 y.o.  5/12/1933    Admit date: 4/9/2021    Discharge date and time:  4/9/2021  7:30 AM    Admission Diagnoses: Principal Problem:    Displaced comminuted fracture of shaft of right femur, init ([facility]  Active Problems:    Depression    Anxiety disorder    Type II diabetes mellitus ([facility]  Resolved Problems:    * No resolved hospital problems. *      Discharge Diagnoses: Principal Problem:    Displaced comminuted fracture of shaft of right femur, init ([facility]  Active Problems:    Depression    Anxiety disorder    Type II diabetes mellitus ([facility]  Resolved Problems:    * No resolved hospital problems. *      Consults:  IP CONSULT TO PRIMARY CARE PROVIDER  IP CONSULT TO ORTHOPEDIC SURGERY  IP CONSULT TO IV TEAM    Procedures: None    Hospital Course: This is an 70-year-old female with a history of dementia, bipolar disorder, type 2 diabetes mellitus, from a nursing facility admitted to the hospital initially for right distal femur fracture. Hospitalist service was called to admit, however orthopedic surgery recommended transfer to [facility] after review of CT of the knee. Patient in stable condition at time of discharge. Discharge Exam:  Vitals:    04/09/21 0118 04/09/21 0230 04/09/21 0500 04/09/21 0657   BP: (!) 116/43 (!) 116/43  119/67   Pulse: 72 79  63   Resp: 14  18    Temp: 97.7 °F (36.5 °C)      TempSrc: Axillary      SpO2: 97% 99%  98%   Weight: 141 lb (64 kg)      Height: 5' 5\" (1.651 m)           General Appearance: Elderly female, somnolent, but awakens to voice. Oriented to person only. Does not appear to be in any distress.   Skin: warm and dry  Head: normocephalic and atraumatic  Eyes: pupils equal, round, and reactive to light, extraocular eye movements intact, conjunctivae normal  Neck: neck supple and non tender without mass   Pulmonary/Chest: clear to auscultation bilaterally- no wheezes, rales or rhonchi, normal air movement, no respiratory distress  Cardiovascular: normal rate, normal S1 and S2 and no carotid bruits  Abdomen: soft, non-tender, non-distended, normal bowel sounds, no masses or organomegaly  Extremities: Leg elevated with ice packing. Mild bilateral lower extremity edema. No cyanosis or clubbing. Distal pulses intact. Neurologic: no cranial nerve deficit and speech normal  No intake/output data recorded. No intake/output data recorded. LABS:  Recent Labs     04/09/21  0144      K 4.4   CL 99   CO2 31*   BUN 17   CREATININE 0.6   GLUCOSE 133*   CALCIUM 8.9       Recent Labs     04/09/21  0144   WBC 7.4   RBC 3.61   HGB 10.9*   HCT 34.1   MCV 94.5   MCH 30.2   MCHC 32.0   RDW 13.8      MPV 11.0       No results for input(s): POCGLU in the last 72 hours. Imaging:  Xr Pelvis (1-2 Views)    Result Date: 4/9/2021  EXAMINATION: TWO XRAY VIEWS OF THE LEFT KNEE; TWO XRAY VIEWS OF THE RIGHT KNEE;   XRAY VIEWS OF THE RIGHT FEMUR; ONE XRAY VIEW OF THE PELVIS 4/9/2021 2:19 am COMPARISON: Right hip series and knee series from October 7, 2019. HISTORY: ORDERING SYSTEM PROVIDED HISTORY: fall TECHNOLOGIST PROVIDED HISTORY: Reason for exam:->fall FINDINGS: PELVIS: Limited evaluation of the lower lumbar spine demonstrates mild to moderate degenerative disc and facet disease. The SI joints appear open with noted inferior spurring. There are postsurgical changes overlying the bilateral SI joints. Pubic symphysis appears congruent. Normal appearance of the superior and inferior pubic rami. No obvious disruption of the pelvic brim. Mild to moderate bilateral hip joint space narrowing with acetabular spurring and sclerosis. Normal contour to the femoral heads.   Femoral head/neck trabeculations appear maintained. Osseous mineralization is decreased. RIGHT FEMUR: Imaged right superior and inferior pubic rami appear intact. Right SI joint is open with noted inferior spurring. Normal contour to the right femoral head. Femoral head/neck trabeculations appear maintained. Comminuted fracture of the distal right femur best seen on the lateral view. Osseous mineralization is decreased. RIGHT KNEE: Comminuted fracture of the distal right femur. On this exam there is no clear extension to the joint space. Advanced tricompartmental degenerative spurring. Osseous mineralization is decreased. There is soft tissue swelling and a joint effusion. LEFT KNEE: No fractures. Normal alignment. Severe joint space narrowing and tricompartmental degenerative spurring. No joint effusion. Distal quadriceps and proximal patellar tendon enthesophytes. Osseous mineralization is decreased. 1.  Comminuted fracture of the distal right femur. On these exams there is no clear extension to the joint space. 2.  No definite fracture of the pelvis, hips, nor left knee. 3.  Osseous mineralization is decreased. 4.  Advanced bilateral knee osteoarthritis. RECOMMENDATION: Recommend CT scan of the distal right femur/right knee. Negative radiographs in the other areas should not deter from obtaining cross-sectional imaging if there is high clinical concern for acute fractures in these other areas. Xr Femur Right (min 2 Views)    Result Date: 4/9/2021  EXAMINATION: TWO XRAY VIEWS OF THE LEFT KNEE; TWO XRAY VIEWS OF THE RIGHT KNEE;   XRAY VIEWS OF THE RIGHT FEMUR; ONE XRAY VIEW OF THE PELVIS 4/9/2021 2:19 am COMPARISON: Right hip series and knee series from October 7, 2019. HISTORY: ORDERING SYSTEM PROVIDED HISTORY: fall TECHNOLOGIST PROVIDED HISTORY: Reason for exam:->fall FINDINGS: PELVIS: Limited evaluation of the lower lumbar spine demonstrates mild to moderate degenerative disc and facet disease.   The SI joints appear open with noted inferior spurring. There are postsurgical changes overlying the bilateral SI joints. Pubic symphysis appears congruent. Normal appearance of the superior and inferior pubic rami. No obvious disruption of the pelvic brim. Mild to moderate bilateral hip joint space narrowing with acetabular spurring and sclerosis. Normal contour to the femoral heads. Femoral head/neck trabeculations appear maintained. Osseous mineralization is decreased. RIGHT FEMUR: Imaged right superior and inferior pubic rami appear intact. Right SI joint is open with noted inferior spurring. Normal contour to the right femoral head. Femoral head/neck trabeculations appear maintained. Comminuted fracture of the distal right femur best seen on the lateral view. Osseous mineralization is decreased. RIGHT KNEE: Comminuted fracture of the distal right femur. On this exam there is no clear extension to the joint space. Advanced tricompartmental degenerative spurring. Osseous mineralization is decreased. There is soft tissue swelling and a joint effusion. LEFT KNEE: No fractures. Normal alignment. Severe joint space narrowing and tricompartmental degenerative spurring. No joint effusion. Distal quadriceps and proximal patellar tendon enthesophytes. Osseous mineralization is decreased. 1.  Comminuted fracture of the distal right femur. On these exams there is no clear extension to the joint space. 2.  No definite fracture of the pelvis, hips, nor left knee. 3.  Osseous mineralization is decreased. 4.  Advanced bilateral knee osteoarthritis. RECOMMENDATION: Recommend CT scan of the distal right femur/right knee. Negative radiographs in the other areas should not deter from obtaining cross-sectional imaging if there is high clinical concern for acute fractures in these other areas.      Xr Knee Left (1-2 Views)    Result Date: 4/9/2021  EXAMINATION: TWO XRAY VIEWS OF THE LEFT KNEE; TWO XRAY VIEWS OF THE RIGHT KNEE;   XRAY VIEWS OF THE RIGHT FEMUR; ONE XRAY VIEW OF THE PELVIS 4/9/2021 2:19 am COMPARISON: Right hip series and knee series from October 7, 2019. HISTORY: ORDERING SYSTEM PROVIDED HISTORY: fall TECHNOLOGIST PROVIDED HISTORY: Reason for exam:->fall FINDINGS: PELVIS: Limited evaluation of the lower lumbar spine demonstrates mild to moderate degenerative disc and facet disease. The SI joints appear open with noted inferior spurring. There are postsurgical changes overlying the bilateral SI joints. Pubic symphysis appears congruent. Normal appearance of the superior and inferior pubic rami. No obvious disruption of the pelvic brim. Mild to moderate bilateral hip joint space narrowing with acetabular spurring and sclerosis. Normal contour to the femoral heads. Femoral head/neck trabeculations appear maintained. Osseous mineralization is decreased. RIGHT FEMUR: Imaged right superior and inferior pubic rami appear intact. Right SI joint is open with noted inferior spurring. Normal contour to the right femoral head. Femoral head/neck trabeculations appear maintained. Comminuted fracture of the distal right femur best seen on the lateral view. Osseous mineralization is decreased. RIGHT KNEE: Comminuted fracture of the distal right femur. On this exam there is no clear extension to the joint space. Advanced tricompartmental degenerative spurring. Osseous mineralization is decreased. There is soft tissue swelling and a joint effusion. LEFT KNEE: No fractures. Normal alignment. Severe joint space narrowing and tricompartmental degenerative spurring. No joint effusion. Distal quadriceps and proximal patellar tendon enthesophytes. Osseous mineralization is decreased. 1.  Comminuted fracture of the distal right femur. On these exams there is no clear extension to the joint space. 2.  No definite fracture of the pelvis, hips, nor left knee. 3.  Osseous mineralization is decreased.  4.  Advanced bilateral knee osteoarthritis. RECOMMENDATION: Recommend CT scan of the distal right femur/right knee. Negative radiographs in the other areas should not deter from obtaining cross-sectional imaging if there is high clinical concern for acute fractures in these other areas. Xr Knee Right (1-2 Views)    Result Date: 4/9/2021  EXAMINATION: TWO XRAY VIEWS OF THE LEFT KNEE; TWO XRAY VIEWS OF THE RIGHT KNEE;   XRAY VIEWS OF THE RIGHT FEMUR; ONE XRAY VIEW OF THE PELVIS 4/9/2021 2:19 am COMPARISON: Right hip series and knee series from October 7, 2019. HISTORY: ORDERING SYSTEM PROVIDED HISTORY: fall TECHNOLOGIST PROVIDED HISTORY: Reason for exam:->fall FINDINGS: PELVIS: Limited evaluation of the lower lumbar spine demonstrates mild to moderate degenerative disc and facet disease. The SI joints appear open with noted inferior spurring. There are postsurgical changes overlying the bilateral SI joints. Pubic symphysis appears congruent. Normal appearance of the superior and inferior pubic rami. No obvious disruption of the pelvic brim. Mild to moderate bilateral hip joint space narrowing with acetabular spurring and sclerosis. Normal contour to the femoral heads. Femoral head/neck trabeculations appear maintained. Osseous mineralization is decreased. RIGHT FEMUR: Imaged right superior and inferior pubic rami appear intact. Right SI joint is open with noted inferior spurring. Normal contour to the right femoral head. Femoral head/neck trabeculations appear maintained. Comminuted fracture of the distal right femur best seen on the lateral view. Osseous mineralization is decreased. RIGHT KNEE: Comminuted fracture of the distal right femur. On this exam there is no clear extension to the joint space. Advanced tricompartmental degenerative spurring. Osseous mineralization is decreased. There is soft tissue swelling and a joint effusion. LEFT KNEE: No fractures. Normal alignment.   Severe joint space narrowing and tricompartmental degenerative spurring. No joint effusion. Distal quadriceps and proximal patellar tendon enthesophytes. Osseous mineralization is decreased. 1.  Comminuted fracture of the distal right femur. On these exams there is no clear extension to the joint space. 2.  No definite fracture of the pelvis, hips, nor left knee. 3.  Osseous mineralization is decreased. 4.  Advanced bilateral knee osteoarthritis. RECOMMENDATION: Recommend CT scan of the distal right femur/right knee. Negative radiographs in the other areas should not deter from obtaining cross-sectional imaging if there is high clinical concern for acute fractures in these other areas. Ct Head Wo Contrast    Result Date: 4/9/2021  EXAMINATION: CT OF THE HEAD WITHOUT CONTRAST  4/9/2021 2:03 am TECHNIQUE: CT of the head was performed without the administration of intravenous contrast. Dose modulation, iterative reconstruction, and/or weight based adjustment of the mA/kV was utilized to reduce the radiation dose to as low as reasonably achievable. COMPARISON: Unenhanced head CT from 10/07/2019. HISTORY: ORDERING SYSTEM PROVIDED HISTORY: fall TECHNOLOGIST PROVIDED HISTORY: Has a \"code stroke\" or \"stroke alert\" been called? ->No Reason for exam:->fall Decision Support Exception->Emergency Medical Condition (MA) FINDINGS: BRAIN/VENTRICLES: There is no acute intracranial hemorrhage, mass effect or midline shift. No abnormal extra-axial fluid collection. The gray-white differentiation is maintained without evidence of an acute infarct. There is no evidence of hydrocephalus. There is senescent cerebral and cerebellar atrophy. Patchy periventricular white matter hypodensity is noted, consistent with mild chronic small vessel ischemic change. Stable 5 mm hypodensity within the tail of the left putamen, most likely [employer] space, with stable remote lacunar infarct also considered.  ORBITS: The visualized portion of the orbits demonstrate no acute abnormality. Native ocular lenses are not seen, compatible with prior bilateral cataract surgery. SINUSES: The visualized paranasal sinuses and mastoid air cells demonstrate no acute abnormality. SOFT TISSUES/SKULL:  No acute abnormality of the visualized skull or soft tissues. Bilateral carotid siphon calcification is noted. Nonmetallic dentures are in place. No acute intracranial abnormality. No basilar skull nor calvarial fracture. Ct Cervical Spine Wo Contrast    Result Date: 4/9/2021  EXAMINATION: CT OF THE CERVICAL SPINE WITHOUT CONTRAST 4/9/2021 2:03 am TECHNIQUE: CT of the cervical spine was performed without the administration of intravenous contrast. Multiplanar reformatted images are provided for review. Dose modulation, iterative reconstruction, and/or weight based adjustment of the mA/kV was utilized to reduce the radiation dose to as low as reasonably achievable. COMPARISON: None. HISTORY: ORDERING SYSTEM PROVIDED HISTORY: fall TECHNOLOGIST PROVIDED HISTORY: Reason for exam:->fall Decision Support Exception->Emergency Medical Condition (MA) FINDINGS: BONES/ALIGNMENT: There is no acute fracture or traumatic malalignment. Stable exaggeration of the normal cervical lordosis. DEGENERATIVE CHANGES: Multilevel bilateral facet arthropathy. Mild anterior spurring at C5-C6, with minimal multilevel posterolateral spurring bilaterally. Mild relative osseous neural foraminal narrowing at C4-C5 on the right. Focally worse loss of disc height at C5-C6. No critical canal stenosis. Ligamentum flavum hypertrophy at C3-C4 and C4-C5, also C5-C6. SOFT TISSUES: Normal prevertebral soft tissues. Bilateral carotid bulb calcifications noted. No acute abnormality of the cervical spine. Chronic findings as described.          Patient Instructions:   Current Discharge Medication List      CONTINUE these medications which have NOT CHANGED    Details   senna (SENOKOT) 8.6 MG tablet Take 1 tablet by mouth daily      lactulose (CHRONULAC) 10 GM/15ML solution Take 20 g by mouth daily      Azelastine HCl 137 MCG/SPRAY SOLN 137 mcg by Nasal route daily      !! Elastic Bandages & Supports (LUMBAR BACK BRACE/SUPPORT PAD) MISC 1 each by Does not apply route daily  Qty: 1 each, Refills: 0      LORazepam (ATIVAN) 0.5 MG tablet Take 0.5 mg by mouth every 6 hours as needed for Anxiety. .      vitamin D 1000 units CAPS Take 2 capsules by mouth daily      loratadine (CLARITIN) 10 MG tablet Take 10 mg by mouth daily      melatonin 5 MG TABS tablet Take 5 mg by mouth daily       mirtazapine (REMERON) 30 MG tablet Take 30 mg by mouth nightly      vitamin E 400 UNIT capsule Take 400 Units by mouth daily      diclofenac sodium 1 % GEL Apply 2 g topically 4 times daily      oxybutynin (DITROPAN-XL) 10 MG extended release tablet Take 10 mg by mouth daily      montelukast (SINGULAIR) 10 MG tablet Take 10 mg by mouth nightly      docusate sodium (COLACE) 100 MG capsule Take 100 mg by mouth 2 times daily      rivastigmine (EXELON) 6 MG capsule Take 6 mg by mouth daily       memantine (NAMENDA) 10 MG tablet Take 10 mg by mouth daily       OLANZapine (ZYPREXA) 7.5 MG tablet Take 7.5 mg by mouth nightly       bisacodyl (DULCOLAX) 10 MG suppository Place 10 mg rectally daily as needed for Constipation      FLONASE 50 MCG/ACT nasal spray 1 spray by Nasal route daily  Qty: 1 Bottle, Refills: 3    Associated Diagnoses: Dry nose      !! Elastic Bandages & Supports (MEDICAL COMPRESSION STOCKINGS) MISC 1 each by Does not apply route daily as needed  Qty: 1 each, Refills: 0    Comments: Knee high  Associated Diagnoses: Closed left ankle fracture, with routine healing, subsequent encounter      acetaminophen (TYLENOL) 325 MG tablet Take 650 mg by mouth every 6 hours as needed for Pain       !! - Potential duplicate medications found. Please discuss with provider.               NOTE: This report was transcribed using voice recognition software. Every effort was made to ensure accuracy; however, inadvertent computerized transcription errors may be present.      Signed:  Electronically signed by Tyson Call DO on 4/9/2021 at 7:30 AM

## 2021-04-09 NOTE — Clinical Note
Patient Class: Inpatient [101]   REQUIRED: Diagnosis: Displaced comminuted fracture of shaft of right femur, init ([facility] [[id number]]   Estimated Length of Stay: Estimated stay of more than 2 midnights   Admitting Provider: Josué Azevedo [[id number]]   Telemetry Bed Required?: No

## 2021-04-09 NOTE — LETTER
Admission DX: Closed fracture of distal epiphysis of right femur, initial encounter [facility]) and codes:       PATIENT                 Name: Christoph Eddy : 1933 (87 yrs)   Address: [address] [place] [address], [address] Sex: Female    [facility] [address]: [address] [address]         Marital Status:    Employer: NONE         Voodoo: Rastafari   Primary Care Provider: Ebenezer Garcia MD         Primary Phone: [phone]   EMERGENCY CONTACT   Contact Name Legal Guardian? Relationship to Patient Home Phone Work Phone   1. Kenyon Green (Poa)  2. Anu Mercer      Child  Other [phone]              GUARANTOR            Guarantor: Christoph Eddy     : 1933   Address: [address] [facility]* Sex: Female     [address] [address]     Relation to Patient: Self       Home Phone: [phone]   Guarantor ID: [id number]       Work Phone:     Guarantor Employer: NONE         Status: NOT EMPLO*      COVERAGE        PRIMARY INSURANCE   Payor: [address] [place] [address]*   Payor Address: Denys Steve, [address]       Group Number: [id number] Type: INDEMNITY   Subscriber Name: Omar Reyes : 1933   Subscriber ID: [id number] Pat. Rel. to Sub: Self   SECONDARY INSURANCE   Payor:   Plan:     Payor Address:  ,           Group Number:   Insurance Type:     Subscriber Name:   Subscriber :     Subscriber ID:   Pat.  Rel. to Sub:

## 2021-04-09 NOTE — H&P
[address]                  [address] [place],  [facility]                              HISTORY AND PHYSICAL    PATIENT NAME: Jose C Hadley                  :        1933  MED REC NO:   [id number]                            ROOM:       [facility]  ACCOUNT NO:   [de-identified]                           ADMIT DATE: 2021  PROVIDER:     Sancho Correa DO    CHIEF COMPLAINT:  Right knee pain. HISTORY OF PRESENT ILLNESS:  The patient is an 80-year-old   female who was transferred from [address] [place] [address] to [place] [address] for evaluation of right distal femur fracture. She was seen by Orthopedic Surgery at [address] [place] [address] who  recommended transfer to [place] [address]. PAST MEDICAL HISTORY:  Bipolar affective disorder, cancer, dementia,  depression, diabetes mellitus, dysphagia, glaucoma, macular  degeneration, osteoarthritis. PAST SURGICAL HISTORY:  Ankle fracture surgery, appendectomy, cardiac  catheterization, fracture surgery, hysterectomy, tonsillectomy. MEDICATIONS PRIOR TO ADMISSION:  Albuterol inhaler, Tums, Depakote,  multivitamin, loperamide, Milk of Magnesia, Neurontin, zinc, Zofran,  Senokot, Chronulac, Ativan, vitamin D, Claritin, Remeron, vitamin E,  diclofenac topical, Ditropan XL, Singulair, Colace, Exelon, Namenda,  Zyprexa, Dulcolax, Tylenol, azelastine, melatonin, Flonase. REVIEW OF SYSTEMS:  Remarkable for above-stated chief complaint plus  allergies none known. PRIMARY CARE PROVIDER:  Jesus Rg MD    PHYSICAL EXAMINATION:  GENERAL APPEARANCE:  Reveals an 80-year-old  female who is  alert, responsive, cooperative and a poor historian. VITAL SIGNS:  On admission, temperature 96.8, pulse 68, respirations 18,  blood pressure 138/40. HEENT:  Head:  Normocephalic, atraumatic. Eyes:  Pupils equal and  reactive to light. Extraocular muscles intact.   Fundi

## 2021-04-09 NOTE — H&P
[address] [place], [place] [address]ist Group   History and Physical      CHIEF COMPLAINT: Right knee pain    History of Present Illness:  80 y.o. female with a history of diabetes mellitus, macular degeneration, bipolar disorder, osteoarthritis, dementia, glaucoma presents from [facility] with right knee pain. At this time patient is somnolent, awakens to voice, but does not provide history. History obtained from ED provider and chart review. X-ray of the right knee shows a comminuted fracture of the distal right femur. Pubic surgery was consulted, and recommended CT of the right knee which is pending. Informant(s) for H&P: ED provider, chart review    REVIEW OF SYSTEMS:  Unable to obtain review of systems due to dementia      PMH:  Past Medical History:   Diagnosis Date    Bipolar affective disorder, current episode manic with psychotic symptoms ([address] 8/26/2017    Cancer ([facility]     Chronic right-sided low back pain with right-sided sciatica 11/13/2018    Dementia ([facility]     Depression     Diabetes mellitus ([facility]     Dysphagia     Glaucoma     Macular degeneration     Muscle weakness (generalized)     Primary osteoarthritis of right knee 11/13/2018       Surgical History:  Past Surgical History:   Procedure Laterality Date    ANKLE FRACTURE SURGERY Left [id number]    APPENDECTOMY      CARDIAC CATHETERIZATION  10/21/2011    Dr. Sigrid Goodson ENDOSCOPY  1/13/16    DR Deann Lomeli       Medications Prior to Admission:    Prior to Admission medications    Medication Sig Start Date End Date Taking?  Authorizing Provider   senna (SENOKOT) 8.6 MG tablet Take 1 tablet by mouth daily    Historical Provider, MD   lactulose (CHRONULAC) 10 GM/15ML solution Take 20 g by mouth daily    Historical Provider, MD   Azelastine HCl 137 MCG/SPRAY SOLN 137 mcg by Nasal route daily    Historical Provider, MD   Elastic Bandages & Supports (LUMBAR BACK BRACE/SUPPORT PAD) MISC 1 each by Does not apply route daily 8/18/19   Roz Anderson, DO   LORazepam (ATIVAN) 0.5 MG tablet Take 0.5 mg by mouth every 6 hours as needed for Anxiety. Anuj Jones     Historical Provider, MD   vitamin D 1000 units CAPS Take 2 capsules by mouth daily    Historical Provider, MD   loratadine (CLARITIN) 10 MG tablet Take 10 mg by mouth daily    Historical Provider, MD   melatonin 5 MG TABS tablet Take 5 mg by mouth daily     Historical Provider, MD   mirtazapine (REMERON) 30 MG tablet Take 30 mg by mouth nightly    Historical Provider, MD   vitamin E 400 UNIT capsule Take 400 Units by mouth daily    Historical Provider, MD   diclofenac sodium 1 % GEL Apply 2 g topically 4 times daily    Historical Provider, MD   oxybutynin (DITROPAN-XL) 10 MG extended release tablet Take 10 mg by mouth daily    Historical Provider, MD   montelukast (SINGULAIR) 10 MG tablet Take 10 mg by mouth nightly    Historical Provider, MD   docusate sodium (COLACE) 100 MG capsule Take 100 mg by mouth 2 times daily    Historical Provider, MD   rivastigmine (EXELON) 6 MG capsule Take 6 mg by mouth daily     Historical Provider, MD   memantine (NAMENDA) 10 MG tablet Take 10 mg by mouth daily     Historical Provider, MD   OLANZapine (ZYPREXA) 7.5 MG tablet Take 7.5 mg by mouth nightly     Historical Provider, MD   bisacodyl (DULCOLAX) 10 MG suppository Place 10 mg rectally daily as needed for Constipation    Historical Provider, MD Blaire Restrepo 50 MCG/ACT nasal spray 1 spray by Nasal route daily  Patient taking differently: 1 spray by Nasal route daily as needed  9/22/17   Raissa Lopez,    Elastic Bandages & Supports (MEDICAL COMPRESSION STOCKINGS) MISC 1 each by Does not apply route daily as needed 6/8/16   MELIDA Russ   acetaminophen (TYLENOL) 325 MG tablet Take 650 mg by mouth every 6 hours as needed for Pain    Historical Provider, MD       Allergies: ORDERING SYSTEM PROVIDED HISTORY: fall TECHNOLOGIST PROVIDED HISTORY: Reason for exam:->fall FINDINGS: PELVIS: Limited evaluation of the lower lumbar spine demonstrates mild to moderate degenerative disc and facet disease. The SI joints appear open with noted inferior spurring. There are postsurgical changes overlying the bilateral SI joints. Pubic symphysis appears congruent. Normal appearance of the superior and inferior pubic rami. No obvious disruption of the pelvic brim. Mild to moderate bilateral hip joint space narrowing with acetabular spurring and sclerosis. Normal contour to the femoral heads. Femoral head/neck trabeculations appear maintained. Osseous mineralization is decreased. RIGHT FEMUR: Imaged right superior and inferior pubic rami appear intact. Right SI joint is open with noted inferior spurring. Normal contour to the right femoral head. Femoral head/neck trabeculations appear maintained. Comminuted fracture of the distal right femur best seen on the lateral view. Osseous mineralization is decreased. RIGHT KNEE: Comminuted fracture of the distal right femur. On this exam there is no clear extension to the joint space. Advanced tricompartmental degenerative spurring. Osseous mineralization is decreased. There is soft tissue swelling and a joint effusion. LEFT KNEE: No fractures. Normal alignment. Severe joint space narrowing and tricompartmental degenerative spurring. No joint effusion. Distal quadriceps and proximal patellar tendon enthesophytes. Osseous mineralization is decreased. 1.  Comminuted fracture of the distal right femur. On these exams there is no clear extension to the joint space. 2.  No definite fracture of the pelvis, hips, nor left knee. 3.  Osseous mineralization is decreased. 4.  Advanced bilateral knee osteoarthritis. RECOMMENDATION: Recommend CT scan of the distal right femur/right knee.   Negative radiographs in the other areas should not deter from obtaining cross-sectional imaging if there is high clinical concern for acute fractures in these other areas. Xr Femur Right (min 2 Views)    Result Date: 4/9/2021  EXAMINATION: TWO XRAY VIEWS OF THE LEFT KNEE; TWO XRAY VIEWS OF THE RIGHT KNEE;   XRAY VIEWS OF THE RIGHT FEMUR; ONE XRAY VIEW OF THE PELVIS 4/9/2021 2:19 am COMPARISON: Right hip series and knee series from October 7, 2019. HISTORY: ORDERING SYSTEM PROVIDED HISTORY: fall TECHNOLOGIST PROVIDED HISTORY: Reason for exam:->fall FINDINGS: PELVIS: Limited evaluation of the lower lumbar spine demonstrates mild to moderate degenerative disc and facet disease. The SI joints appear open with noted inferior spurring. There are postsurgical changes overlying the bilateral SI joints. Pubic symphysis appears congruent. Normal appearance of the superior and inferior pubic rami. No obvious disruption of the pelvic brim. Mild to moderate bilateral hip joint space narrowing with acetabular spurring and sclerosis. Normal contour to the femoral heads. Femoral head/neck trabeculations appear maintained. Osseous mineralization is decreased. RIGHT FEMUR: Imaged right superior and inferior pubic rami appear intact. Right SI joint is open with noted inferior spurring. Normal contour to the right femoral head. Femoral head/neck trabeculations appear maintained. Comminuted fracture of the distal right femur best seen on the lateral view. Osseous mineralization is decreased. RIGHT KNEE: Comminuted fracture of the distal right femur. On this exam there is no clear extension to the joint space. Advanced tricompartmental degenerative spurring. Osseous mineralization is decreased. There is soft tissue swelling and a joint effusion. LEFT KNEE: No fractures. Normal alignment. Severe joint space narrowing and tricompartmental degenerative spurring. No joint effusion. Distal quadriceps and proximal patellar tendon enthesophytes. Osseous mineralization is decreased. 1.  Comminuted fracture of the distal right femur. On these exams there is no clear extension to the joint space. 2.  No definite fracture of the pelvis, hips, nor left knee. 3.  Osseous mineralization is decreased. 4.  Advanced bilateral knee osteoarthritis. RECOMMENDATION: Recommend CT scan of the distal right femur/right knee. Negative radiographs in the other areas should not deter from obtaining cross-sectional imaging if there is high clinical concern for acute fractures in these other areas. Xr Knee Left (1-2 Views)    Result Date: 4/9/2021  EXAMINATION: TWO XRAY VIEWS OF THE LEFT KNEE; TWO XRAY VIEWS OF THE RIGHT KNEE;   XRAY VIEWS OF THE RIGHT FEMUR; ONE XRAY VIEW OF THE PELVIS 4/9/2021 2:19 am COMPARISON: Right hip series and knee series from October 7, 2019. HISTORY: ORDERING SYSTEM PROVIDED HISTORY: fall TECHNOLOGIST PROVIDED HISTORY: Reason for exam:->fall FINDINGS: PELVIS: Limited evaluation of the lower lumbar spine demonstrates mild to moderate degenerative disc and facet disease. The SI joints appear open with noted inferior spurring. There are postsurgical changes overlying the bilateral SI joints. Pubic symphysis appears congruent. Normal appearance of the superior and inferior pubic rami. No obvious disruption of the pelvic brim. Mild to moderate bilateral hip joint space narrowing with acetabular spurring and sclerosis. Normal contour to the femoral heads. Femoral head/neck trabeculations appear maintained. Osseous mineralization is decreased. RIGHT FEMUR: Imaged right superior and inferior pubic rami appear intact. Right SI joint is open with noted inferior spurring. Normal contour to the right femoral head. Femoral head/neck trabeculations appear maintained. Comminuted fracture of the distal right femur best seen on the lateral view. Osseous mineralization is decreased. RIGHT KNEE: Comminuted fracture of the distal right femur.   On this exam there is no clear extension to the joint space. Advanced tricompartmental degenerative spurring. Osseous mineralization is decreased. There is soft tissue swelling and a joint effusion. LEFT KNEE: No fractures. Normal alignment. Severe joint space narrowing and tricompartmental degenerative spurring. No joint effusion. Distal quadriceps and proximal patellar tendon enthesophytes. Osseous mineralization is decreased. 1.  Comminuted fracture of the distal right femur. On these exams there is no clear extension to the joint space. 2.  No definite fracture of the pelvis, hips, nor left knee. 3.  Osseous mineralization is decreased. 4.  Advanced bilateral knee osteoarthritis. RECOMMENDATION: Recommend CT scan of the distal right femur/right knee. Negative radiographs in the other areas should not deter from obtaining cross-sectional imaging if there is high clinical concern for acute fractures in these other areas. Xr Knee Right (1-2 Views)    Result Date: 4/9/2021  EXAMINATION: TWO XRAY VIEWS OF THE LEFT KNEE; TWO XRAY VIEWS OF THE RIGHT KNEE;   XRAY VIEWS OF THE RIGHT FEMUR; ONE XRAY VIEW OF THE PELVIS 4/9/2021 2:19 am COMPARISON: Right hip series and knee series from October 7, 2019. HISTORY: ORDERING SYSTEM PROVIDED HISTORY: fall TECHNOLOGIST PROVIDED HISTORY: Reason for exam:->fall FINDINGS: PELVIS: Limited evaluation of the lower lumbar spine demonstrates mild to moderate degenerative disc and facet disease. The SI joints appear open with noted inferior spurring. There are postsurgical changes overlying the bilateral SI joints. Pubic symphysis appears congruent. Normal appearance of the superior and inferior pubic rami. No obvious disruption of the pelvic brim. Mild to moderate bilateral hip joint space narrowing with acetabular spurring and sclerosis. Normal contour to the femoral heads. Femoral head/neck trabeculations appear maintained. Osseous mineralization is decreased.  RIGHT FEMUR: Imaged right superior and inferior pubic rami appear intact. Right SI joint is open with noted inferior spurring. Normal contour to the right femoral head. Femoral head/neck trabeculations appear maintained. Comminuted fracture of the distal right femur best seen on the lateral view. Osseous mineralization is decreased. RIGHT KNEE: Comminuted fracture of the distal right femur. On this exam there is no clear extension to the joint space. Advanced tricompartmental degenerative spurring. Osseous mineralization is decreased. There is soft tissue swelling and a joint effusion. LEFT KNEE: No fractures. Normal alignment. Severe joint space narrowing and tricompartmental degenerative spurring. No joint effusion. Distal quadriceps and proximal patellar tendon enthesophytes. Osseous mineralization is decreased. 1.  Comminuted fracture of the distal right femur. On these exams there is no clear extension to the joint space. 2.  No definite fracture of the pelvis, hips, nor left knee. 3.  Osseous mineralization is decreased. 4.  Advanced bilateral knee osteoarthritis. RECOMMENDATION: Recommend CT scan of the distal right femur/right knee. Negative radiographs in the other areas should not deter from obtaining cross-sectional imaging if there is high clinical concern for acute fractures in these other areas. Ct Head Wo Contrast    Result Date: 4/9/2021  EXAMINATION: CT OF THE HEAD WITHOUT CONTRAST  4/9/2021 2:03 am TECHNIQUE: CT of the head was performed without the administration of intravenous contrast. Dose modulation, iterative reconstruction, and/or weight based adjustment of the mA/kV was utilized to reduce the radiation dose to as low as reasonably achievable. COMPARISON: Unenhanced head CT from 10/07/2019. HISTORY: ORDERING SYSTEM PROVIDED HISTORY: fall TECHNOLOGIST PROVIDED HISTORY: Has a \"code stroke\" or \"stroke alert\" been called? ->No Reason for exam:->fall Decision Support Exception->Emergency Medical Condition (MA) FINDINGS: BRAIN/VENTRICLES: There is no acute intracranial hemorrhage, mass effect or midline shift. No abnormal extra-axial fluid collection. The gray-white differentiation is maintained without evidence of an acute infarct. There is no evidence of hydrocephalus. There is senescent cerebral and cerebellar atrophy. Patchy periventricular white matter hypodensity is noted, consistent with mild chronic small vessel ischemic change. Stable 5 mm hypodensity within the tail of the left putamen, most likely [employer] space, with stable remote lacunar infarct also considered. ORBITS: The visualized portion of the orbits demonstrate no acute abnormality. Native ocular lenses are not seen, compatible with prior bilateral cataract surgery. SINUSES: The visualized paranasal sinuses and mastoid air cells demonstrate no acute abnormality. SOFT TISSUES/SKULL:  No acute abnormality of the visualized skull or soft tissues. Bilateral carotid siphon calcification is noted. Nonmetallic dentures are in place. No acute intracranial abnormality. No basilar skull nor calvarial fracture. Ct Cervical Spine Wo Contrast    Result Date: 4/9/2021  EXAMINATION: CT OF THE CERVICAL SPINE WITHOUT CONTRAST 4/9/2021 2:03 am TECHNIQUE: CT of the cervical spine was performed without the administration of intravenous contrast. Multiplanar reformatted images are provided for review. Dose modulation, iterative reconstruction, and/or weight based adjustment of the mA/kV was utilized to reduce the radiation dose to as low as reasonably achievable. COMPARISON: None. HISTORY: ORDERING SYSTEM PROVIDED HISTORY: fall TECHNOLOGIST PROVIDED HISTORY: Reason for exam:->fall Decision Support Exception->Emergency Medical Condition (MA) FINDINGS: BONES/ALIGNMENT: There is no acute fracture or traumatic malalignment. Stable exaggeration of the normal cervical lordosis. DEGENERATIVE CHANGES: Multilevel bilateral facet arthropathy.   Mild anterior spurring at C5-C6, with minimal multilevel posterolateral spurring bilaterally. Mild relative osseous neural foraminal narrowing at C4-C5 on the right. Focally worse loss of disc height at C5-C6. No critical canal stenosis. Ligamentum flavum hypertrophy at C3-C4 and C4-C5, also C5-C6. SOFT TISSUES: Normal prevertebral soft tissues. Bilateral carotid bulb calcifications noted. No acute abnormality of the cervical spine. Chronic findings as described. EKG: Sinus rhythm with first-degree AV block    ASSESSMENT/PLAN:      Principal Problem:    Displaced comminuted fracture of shaft of right femur, init ([facility])  Active Problems:    Depression    Anxiety disorder    Type II diabetes mellitus ([facility]  Resolved Problems:    * No resolved hospital problems. *      1. Displaced comminuted fracture of the distal right femur  -CT of the right lower extremity ordered per orthopedic surgery  -May need surgical intervention    2. Bipolar disorder/depression/anxiety  -Continue home Zyprexa, as needed Ativan    3. Dementia  -Continue home memantine and rivastigmine    4. Type 2 diabetes mellitus  -Blood glucose checks 4 times daily and corrective scale insulin. Blood glucose persistently above 180, will add basal insulin      Code Status: DNR CC  DVT prophylaxis: Subcutaneous enoxaparin    NOTE: This report was transcribed using voice recognition software.  Every effort was made to ensure accuracy; however, inadvertent computerized transcription errors may be present.     Electronically signed by Josias Camp DO on 4/9/2021 at 6:38 AM

## 2021-04-09 NOTE — PROGRESS NOTES
[facility] [facility] at this time for patient transfer to [facility] for Right hip fracture.

## 2021-04-09 NOTE — CARE COORDINATION
4/9/2021  Care Coordination:  Patient was admitted from [place] [facility] the [facility]. Anticipate patient will return when medically stable. SW/CM will follow and assist with transition of care.

## 2021-04-10 NOTE — PROGRESS NOTES
[facility]    Subjective:  Pt alert conversive c/o pain      Current Facility-Administered Medications:     docusate sodium (COLACE) capsule 100 mg, 100 mg, Oral, BID, Velora Camilo Malmer, DO    gabapentin (NEURONTIN) capsule 100 mg, 100 mg, Oral, BID, Velora Camilo Malmer, DO    memantine [facility]) tablet 10 mg, 10 mg, Oral, BID, Velora Camilo Malmer, DO, 10 mg at 04/09/21 2211    mirtazapine (REMERON) tablet 22.5 mg, 22.5 mg, Oral, Nightly, Velora Camilo Malmer, DO, 22.5 mg at 04/09/21 2210    montelukast (SINGULAIR) tablet 10 mg, 10 mg, Oral, Nightly, Velora Camilo Malmer, DO, 10 mg at 04/09/21 2211    OLANZapine (ZYPREXA) tablet 7.5 mg, 7.5 mg, Oral, Daily, Gabrielora Camilo Malmer, DO    rivastigmine (EXELON) capsule 6 mg, 6 mg, Oral, BID, Velora Camilo Malmer, DO, 6 mg at 04/09/21 2211    senna (SENOKOT) tablet 8.6 mg, 1 tablet, Oral, Daily, Velora Camilo Malmer, DO, 8.6 mg at 04/09/21 1657    divalproex (DEPAKOTE) DR tablet 250 mg, 250 mg, Oral, TID, Velora Camilo Malmer, DO, 250 mg at 04/09/21 2211    sodium chloride flush 0.9 % injection 5-40 mL, 5-40 mL, Intravenous, 2 times per day, Rosemarie Gabriella, DO    sodium chloride flush 0.9 % injection 5-40 mL, 5-40 mL, Intravenous, PRN, Gabrielora Camilo Malmer, DO, 10 mL at 04/09/21 1658    0.9 % sodium chloride infusion, 25 mL, Intravenous, PRN, Gabrielora Camilo Malmer, DO    promethazine (PHENERGAN) tablet 12.5 mg, 12.5 mg, Oral, Q6H PRN **OR** ondansetron (ZOFRAN) injection 4 mg, 4 mg, Intravenous, Q6H PRN, Gabrielora Camilo Malmer, DO    polyethylene glycol (GLYCOLAX) packet 17 g, 17 g, Oral, Daily PRN, Rosalva Bell DO    acetaminophen (TYLENOL) tablet 650 mg, 650 mg, Oral, Q6H PRN **OR** acetaminophen (TYLENOL) suppository 650 mg, 650 mg, Rectal, Q6H PRN, Rosalva Bell DO    0.9 % sodium chloride infusion, , Intravenous, Continuous, Rosalva Bell DO, Last Rate: 75 mL/hr at 04/10/21 0533, New Bag at 04/10/21 0533    morphine (PF) injection 2 mg, 2 mg, Intravenous, Q4H PRN, Brandon Bledsoe EMMA DO Arabella, 2 mg at 04/10/21 0533    HYDROcodone-acetaminophen (NORCO) 5-325 MG per tablet 1 tablet, 1 tablet, Oral, Q4H PRN, Margarette Raj Bell DO, 1 tablet at 04/09/21 2341    Objective:    BP (!) 142/71   Pulse 87   Temp 98.5 °F (36.9 °C) (Axillary)   Resp 20   Ht 5' 5\" (1.651 m)   LMP  (LMP Unknown)   SpO2 98%   BMI 23.46 kg/m²     Heart:  reg  Lungs:  ctab  Abd: + bs soft nontender  Extrem:  r knee effusion    CBC with Differential:    Lab Results   Component Value Date    WBC 7.2 04/10/2021    RBC 3.38 04/10/2021    HGB 10.4 04/10/2021    HCT 32.5 04/10/2021     04/10/2021    MCV 96.2 04/10/2021    MCH 30.8 04/10/2021    MCHC 32.0 04/10/2021    RDW 13.7 04/10/2021    NRBC 0.0 12/24/2017    SEGSPCT 61 02/18/2012    BLASTSPCT see below 08/25/2017    METASPCT see below 08/25/2017    LYMPHOPCT 20.5 04/09/2021    LYMPHOPCT 41.0 12/24/2017    MONOPCT 7.9 04/09/2021    BASOPCT 0.3 04/09/2021    MONOSABS 0.58 04/09/2021    LYMPHSABS 1.51 04/09/2021    EOSABS 0.18 04/09/2021    BASOSABS 0.02 04/09/2021     CMP:    Lab Results   Component Value Date     04/10/2021    K 4.2 04/10/2021    K 4.5 08/18/2019     04/10/2021    CO2 29 04/10/2021    BUN 16 04/10/2021    CREATININE 0.5 04/10/2021    GFRAA >60 04/10/2021    LABGLOM >60 04/10/2021    LABGLOM >60 12/24/2017    GLUCOSE 123 04/10/2021    GLUCOSE 101 12/24/2017    PROT 6.4 04/09/2021    LABALBU 3.1 04/09/2021    LABALBU 3.2 12/24/2017    CALCIUM 8.9 04/10/2021    BILITOT 0.3 04/09/2021    BILITOT 1+ 12/19/2017    ALKPHOS 38 04/09/2021    AST 35 04/09/2021    ALT 21 04/09/2021     Warfarin PT/INR:    Lab Results   Component Value Date    INR 1.2 01/19/2016    INR 1.1 10/21/2011    PROTIME 13.0 (H) 01/19/2016    PROTIME 11.1 10/21/2011       Assessment:    Active Problems:    Closed fracture of distal epiphysis of right femur, initial encounter ([facility] [address]  Resolved Problems:    * No resolved hospital problems.  *      Plan:  For OR Malissa Bell  7:04 AM  4/10/2021

## 2021-04-10 NOTE — ANESTHESIA PRE PROCEDURE
Department of Anesthesiology  Preprocedure Note       Name:  Ravi Fernandez   Age:  80 y.o.  :  1933                                          MRN:  [id number]         Date:  4/10/2021      Surgeon: Mary Sequeira):  Mira Barros MD    Procedure: Procedure(s):  femur OPEN REDUCTION INTERNAL FIXATION    Medications prior to admission:   Prior to Admission medications    Medication Sig Start Date End Date Taking? Authorizing Provider   albuterol sulfate HFA (VENTOLIN HFA) 108 (90 Base) MCG/ACT inhaler Inhale 2 puffs into the lungs every 6 hours as needed for Wheezing   Yes Historical Provider, MD   calcium carbonate (TUMS) 500 MG chewable tablet Take 1 tablet by mouth 3 times daily as needed for Heartburn   Yes Historical Provider, MD   divalproex (DEPAKOTE) 250 MG DR tablet Take 250 mg by mouth 3 times daily   Yes Historical Provider, MD   Multiple Vitamins-Minerals (I-ALFRED) TABS Take 1 tablet by mouth   Yes Historical Provider, MD   loperamide (LOPERAMIDE A-D) 2 MG tablet Take 2 mg by mouth 4 times daily as needed for Diarrhea   Yes Historical Provider, MD   magnesium hydroxide (MILK OF MAGNESIA) 400 MG/5ML suspension Take 30 mLs by mouth daily as needed for Constipation   Yes Historical Provider, MD   Multiple Vitamins-Minerals (THERAPEUTIC MULTIVITAMIN-MINERALS) tablet Take 1 tablet by mouth daily   Yes Historical Provider, MD   gabapentin (NEURONTIN) 100 MG capsule Take 100 mg by mouth 2 times daily.    Yes Historical Provider, MD   zinc sulfate (ZINCATE) 220 (50 Zn) MG capsule Take 50 mg by mouth daily   Yes Historical Provider, MD   ondansetron (ZOFRAN) 4 MG tablet Take 4 mg by mouth every 8 hours as needed for Nausea or Vomiting   Yes Historical Provider, MD   senna (SENOKOT) 8.6 MG tablet Take 1 tablet by mouth daily   Yes Historical Provider, MD   lactulose (CHRONULAC) 10 GM/15ML solution Take 20 g by mouth as needed    Yes Historical Provider, MD   LORazepam (ATIVAN) 0.5 MG tablet Take 1 mg by mouth 2 needed 6/8/16   MELIDA Mahmood       Current medications:    Current Facility-Administered Medications   Medication Dose Route Frequency Provider Last Rate Last Admin    docusate sodium (COLACE) capsule 100 mg  100 mg Oral BID Eden Pi Malmer, DO   100 mg at 04/10/21 1689    gabapentin (NEURONTIN) capsule 100 mg  100 mg Oral BID Eden Pi Malmer, DO   100 mg at 04/10/21 0934    memantine (NAMENDA) tablet 10 mg  10 mg Oral BID Eden Pi Malmer, DO   10 mg at 04/10/21 6874    mirtazapine (REMERON) tablet 22.5 mg  22.5 mg Oral Nightly Eden Pi Malmer, DO   22.5 mg at 04/09/21 2210    montelukast (SINGULAIR) tablet 10 mg  10 mg Oral Nightly Eden Pi Malmer, DO   10 mg at 04/09/21 2211    OLANZapine (ZYPREXA) tablet 7.5 mg  7.5 mg Oral Daily Eden Pi Malmer, DO   7.5 mg at 04/10/21 7575    rivastigmine (EXELON) capsule 6 mg  6 mg Oral BID Eden Pi Malmer, DO   6 mg at 04/10/21 4601    senna (SENOKOT) tablet 8.6 mg  1 tablet Oral Daily Eden Pi Malmer, DO   8.6 mg at 04/09/21 1657    divalproex (DEPAKOTE) DR tablet 250 mg  250 mg Oral TID Eden Pi Malmer, DO   250 mg at 04/10/21 4787    sodium chloride flush 0.9 % injection 5-40 mL  5-40 mL Intravenous 2 times per day Eden Pi Malmer, DO   10 mL at 04/10/21 0935    sodium chloride flush 0.9 % injection 5-40 mL  5-40 mL Intravenous PRN Eden Pi Malmer, DO   10 mL at 04/09/21 1658    0.9 % sodium chloride infusion  25 mL Intravenous PRN Eden Pi Malmer, DO        promethazine (PHENERGAN) tablet 12.5 mg  12.5 mg Oral Q6H PRN Eden Pi Malmer, DO        Or    [facility] injection 4 mg  4 mg Intravenous Q6H PRN Eden Pi Malmer, DO        polyethylene glycol (GLYCOLAX) packet 17 g  17 g Oral Daily PRN Eden Pi Malmer, DO        acetaminophen (TYLENOL) tablet 650 mg  650 mg Oral Q6H PRN Eden Pi Malmer, DO        Or    acetaminophen (TYLENOL) suppository 650 mg  650 mg Rectal Q6H PRN Eden Pi Malmer, DO        0.9 % sodium CARDIAC CATHETERIZATION  10/21/2011    Dr. Nissa Miramontes PARTIAL HYSTERECTOMY      TONSILLECTOMY      UPPER GASTROINTESTINAL ENDOSCOPY  1/13/16    DR Beckie Trotter       Social History:    Social History     Tobacco Use    Smoking status: Never Smoker    Smokeless tobacco: Never Used   Substance Use Topics    Alcohol use: No     Alcohol/week: 0.0 standard drinks                                Counseling given: Not Answered      Vital Signs (Current):   Vitals:    04/09/21 1930 04/10/21 0015 04/10/21 0533 04/10/21 0931   BP: (!) 167/98 (!) 149/66 (!) 142/71 131/65   Pulse: 99 82 87 83   Resp: 20 16   Temp: 36.9 °C (98.5 °F)   36.6 °C (97.9 °F)   TempSrc: Axillary   Oral   SpO2: 98%   97%   Height:                                                  BP Readings from Last 3 Encounters:   04/10/21 131/65   04/09/21 106/60   04/19/20 (!) 188/105       NPO Status: Pt reports NPO >8 hrs prior to procedure  BMI:   Wt Readings from Last 3 Encounters:   04/09/21 141 lb (64 kg)   04/19/20 110 lb (49.9 kg)   11/11/19 118 lb (53.5 kg)     Body mass index is 23.46 kg/m². CBC:   Lab Results   Component Value Date    WBC 7.2 04/10/2021    RBC 3.38 04/10/2021    HGB 10.4 04/10/2021    HCT 32.5 04/10/2021    MCV 96.2 04/10/2021    RDW 13.7 04/10/2021     04/10/2021       CMP:   Lab Results   Component Value Date     04/10/2021    K 4.2 04/10/2021    K 4.5 08/18/2019     04/10/2021    CO2 29 04/10/2021    BUN 16 04/10/2021    CREATININE 0.5 04/10/2021    GFRAA >60 04/10/2021    LABGLOM >60 04/10/2021    LABGLOM >60 12/24/2017    GLUCOSE 123 04/10/2021    GLUCOSE 101 12/24/2017    PROT 6.4 04/09/2021    CALCIUM 8.9 04/10/2021    BILITOT 0.3 04/09/2021    BILITOT 1+ 12/19/2017    ALKPHOS 38 04/09/2021    AST 35 04/09/2021    ALT 21 04/09/2021       POC Tests: No results for input(s): POCGLU, POCNA, POCK, POCCL, POCBUN, POCHEMO, POCHCT in the last 72 hours.     Coags:   Lab Results PM

## 2021-04-10 NOTE — CONSULTS
Department of Orthopedic Surgery  Resident Consult Note          Reason for Consult: Right leg pain    HISTORY OF PRESENT ILLNESS:       Patient is a 80 y.o. female who presents with complaint of right leg pain. Patient has dementia at baseline and when examined today repeatedly stated that she just wants to die due to her pain. She would not answer further questions. Per review of her chart she has a history of bipolar disorder, cancer, dementia, and diabetes. Also per the chart she is not on any anticoagulation. She does have a history of ankle fracture on the contralateral limb.   No further orthopedic complaints on exam.    Past Medical History:        Diagnosis Date    Bipolar affective disorder, current episode manic with psychotic symptoms ([facility] [address] 8/26/2017    Cancer ([facility] [address]     Chronic right-sided low back pain with right-sided sciatica 11/13/2018    Dementia ([facility] [address]     Depression     Diabetes mellitus (HCC)     Dysphagia     Glaucoma     Macular degeneration     Muscle weakness (generalized)     Primary osteoarthritis of right knee 11/13/2018     Past Surgical History:        Procedure Laterality Date    ANKLE FRACTURE SURGERY Left [id number]    APPENDECTOMY      CARDIAC CATHETERIZATION  10/21/2011    Dr. Mildred White GASTROINTESTINAL ENDOSCOPY  1/13/16    DR Sabine Salazar     Current Medications:   Current Facility-Administered Medications: docusate sodium (COLACE) capsule 100 mg, 100 mg, Oral, BID  gabapentin (NEURONTIN) capsule 100 mg, 100 mg, Oral, BID  memantine (NAMENDA) tablet 10 mg, 10 mg, Oral, BID  mirtazapine (REMERON) tablet 22.5 mg, 22.5 mg, Oral, Nightly  montelukast (SINGULAIR) tablet 10 mg, 10 mg, Oral, Nightly  OLANZapine (ZYPREXA) tablet 7.5 mg, 7.5 mg, Oral, Daily  rivastigmine (EXELON) capsule 6 mg, 6 mg, Oral, BID  senna (SENOKOT) tablet 8.6 mg, 1 tablet, Oral, Daily  divalproex (DEPAKOTE) DR tablet 250 mg, 250 mg, Oral, TID  sodium chloride flush 0.9 % injection 5-40 mL, 5-40 mL, Intravenous, 2 times per day  sodium chloride flush 0.9 % injection 5-40 mL, 5-40 mL, Intravenous, PRN  0.9 % sodium chloride infusion, 25 mL, Intravenous, PRN  promethazine (PHENERGAN) tablet 12.5 mg, 12.5 mg, Oral, Q6H PRN **OR** ondansetron (ZOFRAN) injection 4 mg, 4 mg, Intravenous, Q6H PRN  polyethylene glycol (GLYCOLAX) packet 17 g, 17 g, Oral, Daily PRN  acetaminophen (TYLENOL) tablet 650 mg, 650 mg, Oral, Q6H PRN **OR** acetaminophen (TYLENOL) suppository 650 mg, 650 mg, Rectal, Q6H PRN  0.9 % sodium chloride infusion, , Intravenous, Continuous  morphine (PF) injection 2 mg, 2 mg, Intravenous, Q4H PRN  HYDROcodone-acetaminophen (NORCO) 5-325 MG per tablet 1 tablet, 1 tablet, Oral, Q4H PRN  Allergies:  Patient has no known allergies. Social History:   TOBACCO:   reports that she has never smoked. She has never used smokeless tobacco.  ETOH:   reports no history of alcohol use. DRUGS:   reports no history of drug use. ACTIVITIES OF DAILY LIVING:    OCCUPATION:    Family History:   No family history on file.     REVIEW OF SYSTEMS:  CONSTITUTIONAL:  negative for acute fevers, chills  EYES:  negative for acute blurred vision, visual disturbance  HEENT:  negative for acute hearing loss, voice change  RESPIRATORY:  negative for acute dyspnea, wheezing  CARDIOVASCULAR:  negative for acute chest pain, palpitations  GASTROINTESTINAL:  negative for acute nausea, vomiting  GENITOURINARY: Positive for frequency, urinary incontinence  HEMATOLOGIC/LYMPHATIC:  negative for acute bleeding and petechiae  MUSCULOSKELETAL:  See HPI  NEUROLOGICAL:  negative for acute headaches, dizziness  BEHAVIOR/PSYCH: Positive for increased agitation and anxiety    PHYSICAL EXAM:    VITALS:  BP (!) 167/98   Pulse 99   Temp 98.5 °F (36.9 °C) (Axillary)   Resp 20   Ht 5' 5\" (1.651 m)   LMP  (LMP Unknown)   SpO2 98%   BMI 23.46 kg/m² CONSTITUTIONAL: Patient did not participate with most of the physical exam, kept repeating that she wanted to die  MUSCULOSKELETAL:  Right lower Extremity:  · Extremity held in a externally rotated position below the knee  · Tenderness to palpation about the knee and distal femur  · Patient would not actively dorsiflex the toes and ankle in command but was observed to be moving the toes  · Sensory exam not completed due to lack of patient cooperation  · No tenderness to palpation of the ankle or foot  · Dorsalis pedis pulses strong, +2/4, good capillary refill in foot warm and well-perfused  · Compartments soft and compressible    Secondary Exam:     bilateralUE: No obvious signs of trauma. -TTP to fingers, hand, wrist, forearm, elbow, humerus, shoulder or clavicle. Gross motor function of the hand, wrist, elbow, and shoulder are intact without pain. Radial pulse +2/4 bilaterally with good capillary refill, compartments soft and compressible    leftLE: No obvious signs of trauma. -TTP to foot, ankle, leg, knee, thigh, hip.--Patient minimally participatory with exam but observed to be moving the toes. +2/4 DP pulses, cap refill <3sec, sensory exam deferred due to lack of patient cooperation, compartments soft and compressible      DATA:    CBC:   Lab Results   Component Value Date    WBC 7.4 04/09/2021    RBC 3.61 04/09/2021    HGB 10.9 04/09/2021    HCT 34.1 04/09/2021    MCV 94.5 04/09/2021    MCH 30.2 04/09/2021    MCHC 32.0 04/09/2021    RDW 13.8 04/09/2021     04/09/2021    MPV 11.0 04/09/2021     PT/INR:    Lab Results   Component Value Date    PROTIME 13.0 01/19/2016    PROTIME 11.1 10/21/2011    INR 1.2 01/19/2016       Radiology Review:  X-ray and CT imaging of the right femur reviewed. Distal femur fracture noted in the metaphyseal region. There is comminution. The distal block is displaced posteriorly relative to the proximal shaft. Very poor bone quality.     CT imaging of the hip reveals

## 2021-04-10 NOTE — ANESTHESIA POSTPROCEDURE EVALUATION
Department of Anesthesiology  Postprocedure Note    Patient: Kaur Hill  MRN: [id number]  YOB: 1933  Date of evaluation: 4/10/2021  Time:  6:41 PM     Procedure Summary     Date: 04/10/21 Room / Location: [place] [address] / [facility]    Anesthesia Start: 6549 Anesthesia Stop:     Procedure: FEMUR OPEN REDUCTION INTERNAL FIXATION (Right Hip) Diagnosis: (fracture femur)    Surgeons: Jennifer Chappell MD Responsible Provider: Magui Rene MD    Anesthesia Type: general ASA Status: 4          Anesthesia Type: general    Calvin Phase I: Calvin Score: 10    Calvin Phase II:      Last vitals: Reviewed and per EMR flowsheets.        Anesthesia Post Evaluation    Patient location during evaluation: PACU  Patient participation: complete - patient participated  Level of consciousness: awake  Pain score: 3  Airway patency: patent  Nausea & Vomiting: no nausea and no vomiting  Complications: no  Cardiovascular status: blood pressure returned to baseline  Respiratory status: acceptable  Hydration status: euvolemic

## 2021-04-11 NOTE — PROGRESS NOTES
Physical Therapy    PT orders received and appreciated. Upon entering room, pt yelling out into hallway. Difficult to redirect and confused. Pt not following commands well. Hinged brace was ordered by ortho and not present yet. Will re-attempt at a later time/date when able.     Guanakito Lemus, PT, DPT  [id number]

## 2021-04-11 NOTE — PROGRESS NOTES
Patient's son Zachariah Stevens notified of transfer to [facility] via voicemail message.

## 2021-04-11 NOTE — PROGRESS NOTES
Department of Orthopedic Surgery  Resident Progress Note    Patient seen and examined. Pain controlled. No new complaints. Some mild confusion this a.m. denies chest pain, shortness of breath, dizziness/lightheadedness.   Denies numbness, tingling, paresthesias    VITALS:  BP (!) 129/53   Pulse 84   Temp 98.2 °F (36.8 °C) (Temporal)   Resp 20   Ht 5' 5\" (1.651 m)   LMP  (LMP Unknown)   SpO2 98%   BMI 23.46 kg/m²     General: Mildly confused    MUSCULOSKELETAL:   right lower extremity:  · Knee immobilizer in place  · Compartments soft and compressible  · +PF, some weakness with dorsiflexion and great toe extension however this is comparable to the contralateral side  · +2/4 DP & PT pulses, Brisk Cap refill, Toes warm and perfused  · Distal sensation grossly intact to Peroneals, Sural, Saphenous, and tibial nrs    CBC:   Lab Results   Component Value Date    WBC 7.2 04/10/2021    HGB 8.1 04/11/2021    HCT 25.5 04/11/2021     04/10/2021     PT/INR:    Lab Results   Component Value Date    PROTIME 13.0 01/19/2016    PROTIME 11.1 10/21/2011    INR 1.2 01/19/2016       ASSESSMENT  · S/P right distal femur ORIF 4/10    PLAN    · Nonweightbearing right lower extremity  · Deep venous thrombosis prophylaxis -Lovenox, PCD's, early mobilization  · Hinged ROM brace ordered  · PT/OT  · Pain Control: IV  · Monitor H&H: 8.1, continue to monitor  · DC: PT/OT/TRAY recs, planning  · D/W attending

## 2021-04-11 NOTE — PROGRESS NOTES
[facility]    Subjective:  Pt pod # 1 alert responsive      Current Facility-Administered Medications:     sodium chloride flush 0.9 % injection 10 mL, 10 mL, Intravenous, 2 times per day, Scarlett Aliment, DO    sodium chloride flush 0.9 % injection 10 mL, 10 mL, Intravenous, PRN, Scarlett Aliment, DO    0.9 % sodium chloride infusion, 25 mL, Intravenous, PRN, Scarlett Aliment, DO    0.9 % sodium chloride infusion, , Intravenous, Continuous, Scarlett Aliment, DO, Last Rate: 100 mL/hr at 04/11/21 0342, New Bag at 04/11/21 0342    ceFAZolin (ANCEF) 1,000 mg in sterile water 10 mL IV syringe, 1,000 mg, Intravenous, Q8H, Scarlett Aliment, DO, 1,000 mg at 04/11/21 0630    senna (SENOKOT) tablet 8.6 mg, 1 tablet, Oral, Daily PRN, Scarlett Aliment, DO    enoxaparin (LOVENOX) injection 40 mg, 40 mg, Subcutaneous, Daily, Scarlett Aliment, DO, 40 mg at 04/10/21 2100    docusate sodium (COLACE) capsule 100 mg, 100 mg, Oral, BID, Scarlett Aliment, DO, 100 mg at 04/10/21 2044    gabapentin (NEURONTIN) capsule 100 mg, 100 mg, Oral, BID, Scarlett Aliment, DO, 100 mg at 04/10/21 9668    memantine (NAMENDA) tablet 10 mg, 10 mg, Oral, BID, Scarlett Aliment, DO, 10 mg at 04/10/21 2044    mirtazapine (REMERON) tablet 22.5 mg, 22.5 mg, Oral, Nightly, Scarlett Aliment, DO, 22.5 mg at 04/10/21 2044    montelukast (SINGULAIR) tablet 10 mg, 10 mg, Oral, Nightly, Scarlett Aliment, DO, 10 mg at 04/10/21 2044    OLANZapine (ZYPREXA) tablet 7.5 mg, 7.5 mg, Oral, Daily, Scarlett Aliment, DO, 7.5 mg at 04/10/21 7086    rivastigmine (EXELON) capsule 6 mg, 6 mg, Oral, BID, Scarlett Aliment, DO, 6 mg at 04/10/21 2045    senna (SENOKOT) tablet 8.6 mg, 1 tablet, Oral, Daily, Scarlett Adan DO, 8.6 mg at 04/09/21 1657    divalproex (DEPAKOTE) DR tablet 250 mg, 250 mg, Oral, TID, Scarlett Adan, DO, 250 mg at 04/10/21 2044    sodium chloride flush 0.9 % injection 5-40 mL, 5-40 mL, Intravenous, 2 times per day, Scarlett Adan DO, 10 mL at 04/10/21 0935    sodium chloride flush 0.9 % injection 5-40 mL, 5-40 mL, Intravenous, PRN, Verdia Burrs, DO, 10 mL at 04/09/21 1658    0.9 % sodium chloride infusion, 25 mL, Intravenous, PRN, Verdia Burrs, DO    promethazine (PHENERGAN) tablet 12.5 mg, 12.5 mg, Oral, Q6H PRN **OR** ondansetron (ZOFRAN) injection 4 mg, 4 mg, Intravenous, Q6H PRN, Verdia Burrs, DO    polyethylene glycol (GLYCOLAX) packet 17 g, 17 g, Oral, Daily PRN, Verdia Burrs, DO    acetaminophen (TYLENOL) tablet 650 mg, 650 mg, Oral, Q6H PRN **OR** acetaminophen (TYLENOL) suppository 650 mg, 650 mg, Rectal, Q6H PRN, Verdia Burrs, DO    0.9 % sodium chloride infusion, , Intravenous, Continuous, Verdia Burrs, DO, Last Rate: 75 mL/hr at 04/10/21 0533, New Bag at 04/10/21 0533    morphine (PF) injection 2 mg, 2 mg, Intravenous, Q4H PRN, Verdia Burrs, DO, 2 mg at 04/11/21 0345    HYDROcodone-acetaminophen (NORCO) 5-325 MG per tablet 1 tablet, 1 tablet, Oral, Q4H PRN, Verdia Burrs, DO, 1 tablet at 04/09/21 2341    Objective:    BP (!) 129/53   Pulse 84   Temp 98.2 °F (36.8 °C) (Temporal)   Resp 20   Ht 5' 5\" (1.651 m)   LMP  (LMP Unknown)   SpO2 98%   BMI 23.46 kg/m²     Heart:  reg  Lungs:  ctab  Abd: + bs soft nontender  Extrem:  Min edema legs    CBC with Differential:    Lab Results   Component Value Date    WBC 7.2 04/10/2021    RBC 3.38 04/10/2021    HGB 8.1 04/11/2021    HCT 25.5 04/11/2021     04/10/2021    MCV 96.2 04/10/2021    MCH 30.8 04/10/2021    MCHC 32.0 04/10/2021    RDW 13.7 04/10/2021    NRBC 0.0 12/24/2017    SEGSPCT 61 02/18/2012    BLASTSPCT see below 08/25/2017    METASPCT see below 08/25/2017    LYMPHOPCT 20.5 04/09/2021    LYMPHOPCT 41.0 12/24/2017    MONOPCT 7.9 04/09/2021    BASOPCT 0.3 04/09/2021    MONOSABS 0.58 04/09/2021    LYMPHSABS 1.51 04/09/2021    EOSABS 0.18 04/09/2021    BASOSABS 0.02 04/09/2021     CMP:    Lab Results   Component Value Date     04/10/2021    K

## 2021-04-12 NOTE — CONSULTS
Orthopaedic Consultation  DORETHA Stallings MD    Reason for Consult: Right leg pain     HISTORY OF PRESENT ILLNESS:                   Patient is a 80 y.o. female who presents with complaint of right leg pain. Patient has dementia at baseline and when examined today repeatedly stated that she just wants to die due to her pain. She would not answer further questions. Per review of her chart she has a history of bipolar disorder, cancer, dementia, and diabetes. Also per the chart she is not on any anticoagulation. She does have a history of ankle fracture on the contralateral limb.   No further orthopedic complaints on exam.     Past Medical History:    Past Medical History             Diagnosis Date    Bipolar affective disorder, current episode manic with psychotic symptoms ([facility] [address] 8/26/2017    Cancer ([facility] [address]      Chronic right-sided low back pain with right-sided sciatica 11/13/2018    Dementia ([facility] [address]      Depression      Diabetes mellitus (HCC)      Dysphagia      Glaucoma      Macular degeneration      Muscle weakness (generalized)      Primary osteoarthritis of right knee 11/13/2018         Past Surgical History:    Past Surgical History             Procedure Laterality Date    ANKLE FRACTURE SURGERY Left [id number]    APPENDECTOMY        CARDIAC CATHETERIZATION   10/21/2011     Dr. George Rocha        HYSTERECTOMY        PARTIAL HYSTERECTOMY        TONSILLECTOMY        UPPER GASTROINTESTINAL ENDOSCOPY   1/13/16     DR Dinorah Andrade         Current Medications:     Current Hospital Medications   Current Facility-Administered Medications: docusate sodium (COLACE) capsule 100 mg, 100 mg, Oral, BID  gabapentin (NEURONTIN) capsule 100 mg, 100 mg, Oral, BID  memantine (NAMENDA) tablet 10 mg, 10 mg, Oral, BID  mirtazapine (REMERON) tablet 22.5 mg, 22.5 mg, Oral, Nightly  montelukast (SINGULAIR) tablet 10 mg, 10 mg, Oral, Nightly  OLANZapine (ZYPREXA) tablet 7.5 mg, 7.5 mg, Oral, Daily  rivastigmine (EXELON) capsule 6 mg, 6 mg, Oral, BID  senna (SENOKOT) tablet 8.6 mg, 1 tablet, Oral, Daily  divalproex (DEPAKOTE) DR tablet 250 mg, 250 mg, Oral, TID  sodium chloride flush 0.9 % injection 5-40 mL, 5-40 mL, Intravenous, 2 times per day  sodium chloride flush 0.9 % injection 5-40 mL, 5-40 mL, Intravenous, PRN  0.9 % sodium chloride infusion, 25 mL, Intravenous, PRN  promethazine (PHENERGAN) tablet 12.5 mg, 12.5 mg, Oral, Q6H PRN **OR** ondansetron (ZOFRAN) injection 4 mg, 4 mg, Intravenous, Q6H PRN  polyethylene glycol (GLYCOLAX) packet 17 g, 17 g, Oral, Daily PRN  acetaminophen (TYLENOL) tablet 650 mg, 650 mg, Oral, Q6H PRN **OR** acetaminophen (TYLENOL) suppository 650 mg, 650 mg, Rectal, Q6H PRN  0.9 % sodium chloride infusion, , Intravenous, Continuous  morphine (PF) injection 2 mg, 2 mg, Intravenous, Q4H PRN  HYDROcodone-acetaminophen (NORCO) 5-325 MG per tablet 1 tablet, 1 tablet, Oral, Q4H PRN     Allergies:  Patient has no known allergies.     Social History:   TOBACCO:   reports that she has never smoked. She has never used smokeless tobacco.  ETOH:   reports no history of alcohol use. DRUGS:   reports no history of drug use.   ACTIVITIES OF DAILY LIVING:    OCCUPATION:    Family History:   Family History   No family history on file.        REVIEW OF SYSTEMS:  CONSTITUTIONAL:  negative for acute fevers, chills  EYES:  negative for acute blurred vision, visual disturbance  HEENT:  negative for acute hearing loss, voice change  RESPIRATORY:  negative for acute dyspnea, wheezing  CARDIOVASCULAR:  negative for acute chest pain, palpitations  GASTROINTESTINAL:  negative for acute nausea, vomiting  GENITOURINARY: Positive for frequency, urinary incontinence  HEMATOLOGIC/LYMPHATIC:  negative for acute bleeding and petechiae  MUSCULOSKELETAL:  See HPI  NEUROLOGICAL:  negative for acute headaches, dizziness  BEHAVIOR/PSYCH: Positive for increased agitation and anxiety     PHYSICAL EXAM:    VITALS:  BP (!) 167/98 Pulse 99   Temp 98.5 °F (36.9 °C) (Axillary)   Resp 20   Ht 5' 5\" (1.651 m)   LMP  (LMP Unknown)   SpO2 98%   BMI 23.46 kg/m²   CONSTITUTIONAL: Patient did not participate with most of the physical exam, kept repeating that she wanted to die  MUSCULOSKELETAL:  Right lower Extremity:  · Extremity held in a externally rotated position below the knee  · Tenderness to palpation about the knee and distal femur  · Patient would not actively dorsiflex the toes and ankle in command but was observed to be moving the toes  · Sensory exam not completed due to lack of patient cooperation  · No tenderness to palpation of the ankle or foot  · Dorsalis pedis pulses strong, +2/4, good capillary refill in foot warm and well-perfused  · Compartments soft and compressible     Secondary Exam:      bilateralUE: No obvious signs of trauma. -TTP to fingers, hand, wrist, forearm, elbow, humerus, shoulder or clavicle. Gross motor function of the hand, wrist, elbow, and shoulder are intact without pain. Radial pulse +2/4 bilaterally with good capillary refill, compartments soft and compressible     leftLE: No obvious signs of trauma. -TTP to foot, ankle, leg, knee, thigh, hip.--Patient minimally participatory with exam but observed to be moving the toes. +2/4 DP pulses, cap refill <3sec, sensory exam deferred due to lack of patient cooperation, compartments soft and compressible        DATA:    CBC:         Lab Results   Component Value Date     WBC 7.4 04/09/2021     RBC 3.61 04/09/2021     HGB 10.9 04/09/2021     HCT 34.1 04/09/2021     MCV 94.5 04/09/2021     MCH 30.2 04/09/2021     MCHC 32.0 04/09/2021     RDW 13.8 04/09/2021      04/09/2021     MPV 11.0 04/09/2021      PT/INR:          Lab Results   Component Value Date     PROTIME 13.0 01/19/2016     PROTIME 11.1 10/21/2011     INR 1.2 01/19/2016         Radiology Review:  X-ray and CT imaging of the right femur reviewed.   Distal femur fracture noted in the metaphyseal region. There is comminution. The distal block is displaced posteriorly relative to the proximal shaft. Very poor bone quality.     CT imaging of the hip reveals no fracture or dislocation.     IMPRESSION:  · Right closed supracondylar distal femur fracture     PLAN:  I had a long discussion with the patient regarding displaced supracondylar femur fracture. Operative management was discussed. The risks and benefits of surgery were discussed and all questions were answered. She would like to proceed with surgery. - Medical clearance  - NPO   - IVF  - NWB RLE  - Pain control  - Plan for ORIF right supracondylar femur fracdture    I spent over 50 minutes reviewing the EMR, radiographs, examining the patient, and discussing the injury and treatment plan with the patient and her family.

## 2021-04-12 NOTE — PROGRESS NOTES
Hospital Medicine    Subjective:    Having some pain issues  Has no other issues      Current Facility-Administered Medications:     potassium chloride (KLOR-CON M) extended release tablet 20 mEq, 20 mEq, Oral, Once, Francis Shea MD    sodium chloride flush 0.9 % injection 10 mL, 10 mL, Intravenous, 2 times per day, Inis Honer, DO, 10 mL at 04/11/21 1113    sodium chloride flush 0.9 % injection 10 mL, 10 mL, Intravenous, PRN, Inis Honer, DO    0.9 % sodium chloride infusion, 25 mL, Intravenous, PRN, Inis Honer, DO    senna (SENOKOT) tablet 8.6 mg, 1 tablet, Oral, Daily PRN, Inis Honer, DO    enoxaparin (LOVENOX) injection 40 mg, 40 mg, Subcutaneous, Daily, Inis Honer, DO, 40 mg at 04/12/21 0830    docusate sodium (COLACE) capsule 100 mg, 100 mg, Oral, BID, Inis Honer, DO, 100 mg at 04/12/21 0830    gabapentin (NEURONTIN) capsule 100 mg, 100 mg, Oral, BID, Inis Honer, DO, 100 mg at 04/12/21 0830    memantine (NAMENDA) tablet 10 mg, 10 mg, Oral, BID, Inis Honer, DO, 10 mg at 04/12/21 1000    mirtazapine (REMERON) tablet 22.5 mg, 22.5 mg, Oral, Nightly, Inis Honer, DO, 22.5 mg at 04/11/21 2121    montelukast (SINGULAIR) tablet 10 mg, 10 mg, Oral, Nightly, Inis Honer, DO, 10 mg at 04/11/21 2121    OLANZapine (ZYPREXA) tablet 7.5 mg, 7.5 mg, Oral, Daily, Inis Honer, DO, 7.5 mg at 04/12/21 0830    rivastigmine (EXELON) capsule 6 mg, 6 mg, Oral, BID, Inis Honer, DO, 6 mg at 04/12/21 1000    senna (SENOKOT) tablet 8.6 mg, 1 tablet, Oral, Daily, Inis Honer, DO, 8.6 mg at 04/12/21 0835    divalproex (DEPAKOTE) DR tablet 250 mg, 250 mg, Oral, TID, Inlanden Hernandezr, DO, 250 mg at 04/12/21 1315    sodium chloride flush 0.9 % injection 5-40 mL, 5-40 mL, Intravenous, 2 times per day, Inlanden Hernandezr, DO, 10 mL at 04/10/21 0935    sodium chloride flush 0.9 % injection 5-40 mL, 5-40 mL, Intravenous, PRN, Inlanden Hernandezr, DO, 10 mL at 04/09/21 PROT 6.4 04/09/2021    LABALBU 3.1 04/09/2021    LABALBU 3.2 12/24/2017    CALCIUM 8.2 04/12/2021    BILITOT 0.3 04/09/2021    BILITOT 1+ 12/19/2017    ALKPHOS 38 04/09/2021    AST 35 04/09/2021    ALT 21 04/09/2021     Warfarin PT/INR:    Lab Results   Component Value Date    INR 1.2 01/19/2016    INR 1.1 10/21/2011    PROTIME 13.0 (H) 01/19/2016    PROTIME 11.1 10/21/2011       Assessment:    Active Problems:    Closed fracture of distal epiphysis of right femur, initial encounter ([facility] [address]  Resolved Problems:    * No resolved hospital problems.  *  pod # 1  Postop anemia acute blood loss               Plan:  Dc ivf  Replace potassium  MIGEL Flores  2:43 PM  4/12/2021

## 2021-04-12 NOTE — CARE COORDINATION
Patient is POD#2 Open reduction internal fixation right comminuted supracondylar femur fracture for Right displaced, comminuted supracondylar femur fracture. Pateint is from [address] [place] [address]. Per Taylor Banks from [facility], patient is a long term bed hold there but will need a precert to return. PT/OT notes are in from today. Voicemail message left for patient's son/JOECLYN Hidalgo to see if the plan is for the patient to return to [facility] or to another facility. Await call back. Holly Contreras RN, CM      I received a call back from the patient's son/jocelyn Monteiro. He doers not want his mother to return to [address] [place] [address]. He would like her to go to 1. [place] [address]  2. [address] [facility]. Referral made to both. Per Kenan Knowles at [facility], they do not have a bed but [facility] does. Per Candy Mcdonald at [address] [place] [address], they do not have a bed but [address] [place], [place] [address] & [facility] does. I formed Viktoriashar Gwen and he will discuss this with his wife and call me back with a choice.   Holly Contreras RN, CM

## 2021-04-12 NOTE — PROGRESS NOTES
Occupational Therapy  OCCUPATIONAL THERAPY INITIAL EVALUATION      Date:2021  Patient Name: Kaur Hill  MRN: [id number]  : 1933  Room: [address] [place], [place] [address]    Evaluating OT: DELANEY Gore, OTR/L #[id number]    Referring physician: Dontae Rayo DO  AM-PAC Daily Activity Raw Score:   Recommended Adaptive Equipment: TBD     Diagnosis: Closed fracture of distal epiphysis of right femur, initial encounter [facility]) [Z72.331T]  Reason for Admission: Fall, s/p R femur ORIF (4/10/21)  Pertinent Medical History/Surgery: Bipolar affective disorder, cancer, chronic R sided low back pain with R sided sciatica, dementia, depression, DM, dysphagia, glaucoma, macular degeneration, muscle weakness, R knee OA, L ankle fx and surgery (), cardiac cath ()      Precautions:  Falls, R LE NWB, R hinge knee brace     Home Living: Patient is a poor historian. Per chart, patient is a resident of a long term care facility. Prior Level of Function:   Patient unable to provide PLOF. Anticipate assistance with ADLs.      Pain Level: Patient unable to rate intensity, reporting R LE and R shoulder pain with mobility  Cognition: A&O: self:yes, place:no, time: no, situation:no  -Patient lethargic  Communication:  Impaired, minimal verbal responses, increased processing time required  Follows 1 step directions ~50% if the time with increased processing time   Memory:  poor   Sequencing:  poor   Problem solving:  poor   Judgement/safety:  poor     Functional Assessment:   Initial Eval Status  Date: 21 Treatment Status  Date: Short Term Goals=LTG  Treatment frequency: PRN 2-4 x/week   Feeding Moderate Assist   Set-up/Stand by Assist    Grooming Moderate Assist  Washed face using R UE with hand over hand assistance, semi-supine in bed   Set-up/Stand by Assist   UB Dressing Dependent   Minimal Assist    LB Dressing Dependent   Total A to doff/ don socks      Bathing Dependent   Maximal Assist    Toileting Dependent Maximal Assist    Bed Mobility  Supine to sit: Dependent x 2  Sit to supine: Dependent x2  Verbal/tactile cues for sequencing, hand placement, and technique, two person assist for safety  Supine to sit: Mod A   Sit to supine: Mod A   Functional Transfers Sit to stand: NT  Stand to sit: NT  Stand pivot: NT  Unable to safely complete  Sit to stand: Max A  Stand to sit: Max A  Stand pivot: Max A  Bed<>bsc/chair using fww   Functional Mobility NT  Unable to safely complete     Balance Sitting:     Static:CGA  -Sat edge of bed ~5 mintues    Dynamic:NT  Standing: NT  Patient will demonstrate 15 minutes or more of unsupported sitting balance with SBA while completing ADLs/ functional activity of patient's choice   Activity Tolerance Poor+  Fair   Visual/  Perceptual Glasses/corrective lenses: no  -Patient with PMH of  Glaucoma and macular degeneration         Safety       Poor                  Fair     Upper Extremities:   Hand Dominance: Right [x]  (per observation)Left []      ROM and Strength Coordination Short Term Goals=LTG   Right UE Active ROM:   Shoulder: ~0-90 degrees flexion/extention, reports pain with additional ROM  Elbow-hand: WFL  -increased time required for ROM     Strength: 3+/5 Fine/gross Motor Coordination: Impaired         Left UE Active assist ROM:   Shoulder: ~0-50 degrees flexion/extention, reports pain with additional ROM  Elbow-hand: WFL  -increased time required for ROM     Strength: 3/5 Fine/gross Motor Coordination: Impaired         Hearing: WFL  Sensation:  Unable to assess  Tone:  WFL  Edema: none observed B UE                            Comments: Nursing approved OT session. Upon arrival, patient semi-supine in bed, lethargic, but able to be aroused by name. OT evaluation performed with education provided regarding the purpose and benefits of OT session, along with mobility and I/ADL completion.   Overall, patient presents with decreased activity tolerance, impaired balance, impaired cognition, and weakness limiting ability to complete ADLs, along with functional mobility/transfers. Patient would benefit from continued skilled OT to increase safety and functional performance with ADLs/ functional mobility to maximize functional outcomes in order for patient to return to [facility]. At end of session, patient semi-supine in bed with call light and phone within reach, along with all lines and tubes intact. Alarm on. · Eval Complexity: Evaluation Complexity: Mod Complexity  ? History: Expanded review of medical records and additional review of physical, cognitive, or psychosocial history related to current functional performance  ? Exam: 5+ performance deficits  ? Assistance/Modification: mod/max assistance or modifications required to perform tasks. May have comorbidities that affect occupational performance.       Assessment of current deficits   Functional mobility [x]  ADLs [x] Strength [x]  Cognition [x]  Functional transfers  [x] IADLs [x] Safety Awareness [x]  Endurance/Activity tolerance [x]  Fine Motor Coordination [x] Balance [x] Vision/perception [x] Sensation []   Gross Motor Coordination [x] ROM [x] Delirium []                  Motor Control []    Plan of Care:  OT 2-4x/week for 1-2 weeks PRN   [x] ADL retraining/modified techniques, along with assistive device recommendations to maximize patient safety and performance with self-care while maintaining precautions   [x]Balance retraining/tolerance tasks for facilitation of postural control with dynamic challenges during ADLs and functional activities   [x] Delirium Prevention/treatment to maximize functional outcomes   [x] Cognitive Re-Training/ executive function skills for safe participation in ADLs/IADLs, along with functional activities   [x] Energy conservation techniques/Strategies to improve activity tolerance      [x] Environmental modifications for safe mobility and completion of ADLs    [x] Functional mobility training/DME recommendations for increased performance, safety and fall prevention  while maintaining precautions     [x] Functional transfer/mobility training/DME recommendations for increased performance, safety and fall prevention while maintaining precautions           []Neuromuscular re-education: facilitation of righting/equilibrium reactions, midline orientation, scapular stability/mobility, normalization muscle tone and facilitation active functional movement/Attention   [x] Patient/Family education to increase safety and functional independence   [x] Positioning to improve functional independence, safety, and skin integrity   []Sensory re-education techniques to improve extremity awareness, maintain skin integrity and improve hand function         [x]Splinting/positioning needs to maintain joint/skin integrity and prevent contractures       [x] Therapeutic Activity to improve activity tolerance for functional activities and ADLs    [x]Therapeutic Exercise to improve UE strength and activity tolerance for functional transfers and ADLs   [] Visual/Perceptual retraining to improve body awareness and safety during functional activities and ADLs   []      Rehab Potential:  Good for established goals    Patient / Family Goal: Patient did not verbalize any goals     Evaluation Time includes thorough review of current medical information, gathering information on past medical history/social history and prior level of function, completion of standardized testing/informal observation of tasks, assessment of data and education on plan of care and goals. Patient and/or family were instructed on functional diagnosis, prognosis/goals and OT plan of care. Demonstrated poor understanding.     Time In: 09:25  Time Out: 09:43  Total Session Time: 18 minutes  Total Treatment Time: n/a    Min Units   OT Eval Low 40422       OT Eval Medium 97166  X 1   OT Eval High [id number]       OT Re-Eval [id number]       Therapeutic Ex [id number]       Therapeutic Activities 28403       ADL/Self Care 20520       Orthotic Management 06014       Neuro Re-Ed 36499       Non-Billable Time          Mod OT Evaluation     Denver Chinchilla, OTD, OTR/L #[id number]

## 2021-04-12 NOTE — PROGRESS NOTES
Orthopaedic Surgery Progress Note  DORETHA Baker MD      SUBJECTIVE:  No acute events over night. Pain moderately controlled. Denies chest pain or shortness of breath. Hgb 7.6    OBJECTIVE:   AAOx3, NAD    Right lower extremity    Dressings C/D/I  SILT L1-S1  TA/EHL/GS intact  Calf soft, NT  +2 DP, W/WP     Data:  CBC:   Lab Results   Component Value Date    WBC 6.9 04/12/2021    RBC 2.57 04/12/2021    HGB 7.6 04/12/2021    HCT 24.6 04/12/2021    MCV 95.7 04/12/2021    MCH 29.6 04/12/2021    MCHC 30.9 04/12/2021    RDW 14.2 04/12/2021     04/12/2021    MPV 11.1 04/12/2021     BMP:    Lab Results   Component Value Date     04/12/2021    K 3.4 04/12/2021    K 4.5 08/18/2019     04/12/2021    CO2 27 04/12/2021    BUN 13 04/12/2021    LABALBU 3.1 04/09/2021    LABALBU 3.2 12/24/2017    CREATININE 0.6 04/12/2021    CALCIUM 8.2 04/12/2021    GFRAA >60 04/12/2021    LABGLOM >60 04/12/2021    LABGLOM >60 12/24/2017    GLUCOSE 79 04/12/2021    GLUCOSE 101 12/24/2017         A/P: POD 2 ORIF right supracondylar femur fracture  - NWB rightleg  - Pain control  - PT/OT  - DVT prophylaxis  - D/C planning for rehab  - Follow up in the office in 3 weeks    DORETHA Baker MD

## 2021-04-12 NOTE — PROGRESS NOTES
Physical Therapy    Physical Therapy Initial Assessment     Name: Deandre Lainez  : 1933  MRN: [id number]    Referring Provider:  Almaz Carrillo DO    Date of Service: 2021    Evaluating PT:  Serina Amor PT, DPT    Room #:  4400/4541-R  Diagnosis:  Closed fracture of distal epiphysis R femur  PMHx/PSHx:  DM, Macular degeneration, Depression, Cancer, Bipolar affective disorder, Dementia, Glaucoma, Dysphagia, L ankle fracture surgery 2016  Procedure/Surgery:  4/10/2021 RIGHT FEMUR OPEN REDUCTION INTERNAL FIXATION   Precautions:  Falls, NWB RLE, RLE immobilizer  Equipment Needs:  TBD    SUBJECTIVE:    Pt comes from [facility]. Pt poor/questionable historian and reported not getting out of bed at the facility, then reported getting up every day for meals. It is not clear prior level of function. OBJECTIVE:   Initial Evaluation  Date: 2021 Treatment  NA Short Term/ Long Term   Goals   AM-PAC 6 Clicks      Was pt agreeable to Eval/treatment? Yes with much encouragement. Does pt have pain? Cried out in pain with mobility of RLE, did not quantify. Unable to provide number when asked. Bed Mobility  Rolling: Dependent  Supine to sit: Dependent +2  Sit to supine: Dependent +2  Scooting: Dependent  Max A   Transfers Sit to stand: NT  Stand to sit: NT  Stand pivot: NT  Max A +1-2   Ambulation    NT  >5 feet with WW with Max A +2   Stair negotiation: ascended and descended  NT  NA   ROM BUE:  Shoulder ROM limited 50% by pain  BLE:  LLE WFL, RLE limted by pain     Strength BUE:  3-/5  BLE:  RLE wiggled toes, LLE 3-/5  Increase 1/3 grade MMT   Balance Sitting EOB:  Mod A  Dynamic Standing:  NT  Sitting EOB:  Min A  Dynamic Standing: Max A +2     Pt is A & O x 1, self only. Pt lethargic and would not maintain eyes opened.    Sensation:  Pt denied numbness and tingling  Edema:  None noted    Vitals:  Blood Pressure at rest - Blood Pressure post session -   Heart Rate at rest - Heart Rate post

## 2021-04-12 NOTE — PLAN OF CARE
Problem: Skin Integrity:  Goal: Will show no infection signs and symptoms  Description: Will show no infection signs and symptoms  Outcome: Met This Shift  Goal: Absence of new skin breakdown  Description: Absence of new skin breakdown  Outcome: Met This Shift     Problem: Falls - Risk of:  Goal: Will remain free from falls  Description: Will remain free from falls  4/12/2021 1038 by Kyler Esteban RN  Outcome: Met This Shift  4/12/2021 0107 by Waqar De La Cruz RN  Outcome: Met This Shift  Goal: Absence of physical injury  Description: Absence of physical injury  4/12/2021 1038 by Kyler Esteban RN  Outcome: Met This Shift  4/12/2021 0107 by Waqar De La Cruz RN  Outcome: Met This Shift     Problem: Confusion - Acute:  Goal: Absence of continued neurological deterioration signs and symptoms  Description: Absence of continued neurological deterioration signs and symptoms  Outcome: Met This Shift  Goal: Mental status will be restored to baseline  Description: Mental status will be restored to baseline  Outcome: Met This Shift     Problem: Discharge Planning:  Goal: Ability to perform activities of daily living will improve  Description: Ability to perform activities of daily living will improve  Outcome: Met This Shift  Goal: Participates in care planning  Description: Participates in care planning  Outcome: Met This Shift     Problem: Injury - Risk of, Physical Injury:  Goal: Will remain free from falls  Description: Will remain free from falls  4/12/2021 1038 by Kyler Esteban RN  Outcome: Met This Shift  4/12/2021 0107 by Waqar De La Cruz RN  Outcome: Met This Shift  Goal: Absence of physical injury  Description: Absence of physical injury  4/12/2021 1038 by Kyler Esteban RN  Outcome: Met This Shift  4/12/2021 0107 by Waqar De La Cruz RN  Outcome: Met This Shift     Problem: Pain:  Goal: Pain level will decrease  Description: Pain level will decrease  Outcome: Met This Shift  Goal: Control of acute

## 2021-04-12 NOTE — OP NOTE
Operative Note      Pre-operative diagnosis: Right displaced, comminuted supracondylar femur fracture     Post-operative diagnosis: Right displaced, comminuted supracondylar femur fracture     Procedure: Open reduction internal fixation right comminuted supracondylar femur fracture     Surgeon: Yuriy Diaz MD     Assistant:  Jose Aguilar DO, PGY-2     Anesthesia:  General     Operative Indication:  80 y.o. female presented with a right comminuted supracondylar femur fracture. Patient was admitted for pain control, medical optimization and definitive surgical treatment. The rationale behind surgery explained and informed consent was obtained following a thorough review of risks, benefits, and alternative treatment options. The typical post-operative recovery process was also discussed.     The risks for surgery include bleeding, infection, damage to blood vessels, damage to nerves, risk of further surgery, chronic pain, restricted range of motion, risk of continued discomfort, further procedures, risk of need for altered activities and altered gait, risk of blood clots, pulmonary embolism, myocardial infarction, and risk of death were discussed. The patient understood these risks and consented for surgery     EBL: 589 cc     Complications: None     ESWZKMOPO. OCTLGUM 3 IPVD distal femur plate                                                       2. Synthes cortical screw x 1                            3. Synthes locking screws x 8     Operative Procedure:  Following general anesthesia, the patient was positioned supine. The affected leg was prepped and draped in the usual standard fashion. An intra-operative time out was called to confirm the planned surgical procedure and administration of an IV antibiotic.     The 10 cm incision was made inline with the distal femur.  The incision was carried down through the skin and subcutaneous tissue to the IT band.  The IT band was then split exposing  vastus lateralis.  The fascia overlying vastus lateralis was split and then the vastus lateralis was split exposing the femur.  The distal femur fracture was noted to be a comminuted. The fracture did not extend into the joint. Traction was applied over a triangle to achieve appropriate length and alignment.  An 8 hole distal femur plate was then placed over the lateral aspect of the femur and held in place with k wires.  Fluoroscopy was then used to confirm anatomic reduction and proper placement of the plate.  At this point the 8 distal locking locking screws were then placed into the plate and 1 non locking screws were placed proximally. An intraoperative xray was used to confirm anatomic reduction.        The surgical site was thoroughly irrigated using pulsatile lavage. The fascia overlying vastus lateralis was closed with 0 vicryl. The IT band was closed using #1 Stratafix. The subcutaneous tissue and skin was closed in layers using 2-0, 3-0 stratafix, dermabond and steris. A dry, sterile dressing was applied. The patient was then transferred to an orthopaedic bed.  The patient was taken to recovery in stable condition.     Roger Messer MD

## 2021-04-13 NOTE — PLAN OF CARE
Problem: Skin Integrity:  Goal: Will show no infection signs and symptoms  Description: Will show no infection signs and symptoms  4/13/2021 1103 by Andreina Jaimes RN  Outcome: Met This Shift  4/13/2021 0121 by Amber Kurtz  Outcome: Met This Shift  Goal: Absence of new skin breakdown  Description: Absence of new skin breakdown  4/13/2021 1103 by Andreina Jaimes RN  Outcome: Met This Shift  4/13/2021 0121 by Amber Kurtz  Outcome: Met This Shift     Problem: Falls - Risk of:  Goal: Will remain free from falls  Description: Will remain free from falls  4/13/2021 1103 by Andreina Jaimes RN  Outcome: Met This Shift  4/13/2021 0121 by Amber Kurtz  Outcome: Met This Shift  Goal: Absence of physical injury  Description: Absence of physical injury  4/13/2021 1103 by Andreina Jaimes RN  Outcome: Met This Shift  4/13/2021 0121 by Amber Kurtz  Outcome: Met This Shift     Problem: Confusion - Acute:  Goal: Absence of continued neurological deterioration signs and symptoms  Description: Absence of continued neurological deterioration signs and symptoms  Outcome: Met This Shift  Goal: Mental status will be restored to baseline  Description: Mental status will be restored to baseline  Outcome: Met This Shift     Problem: Discharge Planning:  Goal: Ability to perform activities of daily living will improve  Description: Ability to perform activities of daily living will improve  Outcome: Met This Shift  Goal: Participates in care planning  Description: Participates in care planning  Outcome: Met This Shift     Problem: Injury - Risk of, Physical Injury:  Goal: Will remain free from falls  Description: Will remain free from falls  4/13/2021 1103 by Andreina Jaimes RN  Outcome: Met This Shift  4/13/2021 0121 by Amber Kurtz  Outcome: Met This Shift  Goal: Absence of physical injury  Description: Absence of physical injury  4/13/2021 1103 by Andreina Jaimes RN  Outcome: Met This Shift  4/13/2021 0121 by Manda Tamayo Tory Alvarez  Outcome: Met This Shift     Problem: Mood - Altered:  Goal: Mood stable  Description: Mood stable  Outcome: Met This Shift  Goal: Absence of abusive behavior  Description: Absence of abusive behavior  Outcome: Met This Shift  Goal: Verbalizations of feeling emotionally comfortable while being cared for will increase  Description: Verbalizations of feeling emotionally comfortable while being cared for will increase  Outcome: Met This Shift     Problem: Psychomotor Activity - Altered:  Goal: Absence of psychomotor disturbance signs and symptoms  Description: Absence of psychomotor disturbance signs and symptoms  Outcome: Met This Shift     Problem: Sensory Perception - Impaired:  Goal: Demonstrations of improved sensory functioning will increase  Description: Demonstrations of improved sensory functioning will increase  Outcome: Met This Shift  Goal: Decrease in sensory misperception frequency  Description: Decrease in sensory misperception frequency  Outcome: Met This Shift  Goal: Able to refrain from responding to false sensory perceptions  Description: Able to refrain from responding to false sensory perceptions  Outcome: Met This Shift  Goal: Demonstrates accurate environmental perceptions  Description: Demonstrates accurate environmental perceptions  Outcome: Met This Shift  Goal: Able to distinguish between reality-based and nonreality-based thinking  Description: Able to distinguish between reality-based and nonreality-based thinking  Outcome: Met This Shift  Goal: Able to interrupt nonreality-based thinking  Description: Able to interrupt nonreality-based thinking  Outcome: Met This Shift     Problem: Sleep Pattern Disturbance:  Goal: Appears well-rested  Description: Appears well-rested  Outcome: Met This Shift     Problem: Pain:  Goal: Pain level will decrease  Description: Pain level will decrease  Outcome: Met This Shift  Goal: Control of acute pain  Description: Control of acute pain  Outcome:  Met This Shift  Goal: Control of chronic pain  Description: Control of chronic pain  Outcome: Met This Shift

## 2021-04-13 NOTE — PROGRESS NOTES
Physical Therapy    Physical Therapy Rx Note     Name: Randell Taylor  : 1933  MRN: [id number]    Referring Provider:  Flip Vanegas DO    Date of Service: 2021    Evaluating PT:  Beni Shultz PT, DPT    Room #:  0143/2549-A  Diagnosis:  Closed fracture of distal epiphysis R femur  PMHx/PSHx:  DM, Macular degeneration, Depression, Cancer, Bipolar affective disorder, Dementia, Glaucoma, Dysphagia, L ankle fracture surgery 2016  Procedure/Surgery:  4/10/2021 RIGHT FEMUR OPEN REDUCTION INTERNAL FIXATION   Precautions:  Falls, NWB RLE, RLE immobilizer  Equipment Needs:  TBD    SUBJECTIVE:    Pt comes from [facility]. Pt poor/questionable historian and reported not getting out of bed at the facility, then reported getting up every day for meals. It is not clear prior level of function. OBJECTIVE:   Initial Evaluation  Date: 2021 Treatment    Short Term/ Long Term   Goals   AM-PAC 6 Clicks     Was pt agreeable to Eval/treatment? Yes with much encouragement. Yes     Does pt have pain? Cried out in pain with mobility of RLE, did not quantify. Unable to provide number when asked. Pain controlled but she had pain with mobility     Bed Mobility  Rolling: Dependent  Supine to sit: Dependent +2  Sit to supine: Dependent +2  Scooting: Dependent Rolling dependent  Supine to sit dependent 2  Sit to supine dep 2  Scooting dep  Max A   Transfers Sit to stand: NT  Stand to sit: NT  Stand pivot: NT Nt  Max A +1-2   Ambulation    NT NT >5 feet with WW with Max A +2   Stair negotiation: ascended and descended  NT NT NA   ROM BUE:  Shoulder ROM limited 50% by pain  BLE:  LLE WFL, RLE limted by pain     Strength BUE:  3-/5  BLE:  RLE wiggled toes, LLE 3-/5  Increase 1/3 grade MMT   Balance Sitting EOB:  Mod A  Dynamic Standing:  NT Sitting eob briefly mod then sba Sitting EOB:  Min A  Dynamic Standing: Max A +2     Pt is A & O x 1, self only.        Therapeutic Exercises:  Supine: LLE AAROM all joints/all planes 10x ea, sitting balance/core stabilization, gently small prom L LE - abd, slr     Patient education  Pt educated on rolling     Patient response to education:   Pt verbalized understanding Pt demonstrated skill Pt requires further education in this area   No  No  Yes      ASSESSMENT:    Comments:  Patient cleared by RN and agreeable to treatment. Pt reports she is nauseated. Pt transferred to eob dep 2. Pt sat eob approx 15 minutes working on activity tolerance. Pt not able to scoot. Pt returned to bed and worked on rolling to remove care pads - pt dependent with rolling. Performed aarom on pt left side but she is limited with abduction due to tightness and she requires cues to relax muscles. She is somewhat anxious and resistive to moving, she requires cues to relax. Pt performed active ankle pumps B LE. Pt returned to bed and linens changed HOB elevated to comfort. Call light in reach and  tray table in reach. RLE elevated on a pillow for positioning/comfort. Treatment:  Patient practiced and was instructed in the following treatment:     Bed mobility: Verbal/tactile cues for sequencing BUEs/BLEs for safe technique with rolling/supine<>sit task.  Sitting balance: verbal/tactile cues for posture and use of BUEs to support self in seated.  Gait training: NT   Therapeutic exercises: As noted above   Neuromuscular education: Seated EOB x 15 minutes/core stabilization, activity tolerance     Pt's/ family goals   1. Patient did not participate in goal setting. Patient and or family understand(s) diagnosis, prognosis, and plan of care.   No     PLAN OF CARE:    Current Treatment Recommendations     [x] Strengthening     [] ROM   [x] Balance Training   [x] Endurance Training   [x] Transfer Training   [x] Gait Training   [] Stair Training   [x] Positioning   [x] Safety and Education Training   [x] Patient/Caregiver Education   [] HEP  [] Other       PT care will be provided in accordance with the objectives noted above and progressed and updated when appropriate. Exercises and functional mobility practice will be used as well as appropriate assistive devices or modalities to obtain goals. Patient and family education will also be administered as needed. Frequency of treatments: 2-5x/week x 1-2 weeks. Time in 957   Time out  1025     Total Treatment Time  27 min    Evaluation Time includes thorough review of current medical information, gathering information on past medical history/social history and prior level of function, completion of standardized testing/informal observation of tasks, assessment of data and education on plan of care and goals.     CPT codes:  [] Low Complexity PT evaluation 13343  [] Moderate Complexity PT evaluation 59842  [] High Complexity PT evaluation 40991  [] PT Re-evaluation 47391  [] Gait training 53303 - minutes  [] Manual therapy 69819 - minutes  [x] Therapeutic activities 80753 27 minutes  [] Therapeutic exercises 40835 - minutes  [] Neuromuscular reeducation 53199  minutes     Nicolasa Johnson, [address] [place] [address]

## 2021-04-13 NOTE — PLAN OF CARE
Problem: Skin Integrity:  Goal: Will show no infection signs and symptoms  Description: Will show no infection signs and symptoms  Outcome: Met This Shift  Goal: Absence of new skin breakdown  Description: Absence of new skin breakdown  Outcome: Met This Shift     Problem: Falls - Risk of:  Goal: Will remain free from falls  Description: Will remain free from falls  Outcome: Met This Shift  Goal: Absence of physical injury  Description: Absence of physical injury  Outcome: Met This Shift     Problem: Injury - Risk of, Physical Injury:  Goal: Will remain free from falls  Description: Will remain free from falls  Outcome: Met This Shift  Goal: Absence of physical injury  Description: Absence of physical injury  Outcome: Met This Shift

## 2021-04-13 NOTE — PROGRESS NOTES
Department of Orthopedic Surgery  Resident Progress Note    Patient seen and examined. Pain controlled. No new complaints. Confused again this am.  Denies shortness of breath, dizziness/lightheadedness.   Denies numbness, tingling, paresthesias    VITALS:  BP (!) 136/58   Pulse 73   Temp 98.9 °F (37.2 °C) (Temporal)   Resp 16   Ht 5' 5\" (1.651 m)   Wt 134 lb 9.6 oz (61.1 kg)   LMP  (LMP Unknown)   SpO2 95%   BMI 22.40 kg/m²     General: Mildly confused    MUSCULOSKELETAL:   right lower extremity:  · Knee immobilizer in place  · Compartments soft and compressible  · +PF, some weakness with dorsiflexion and great toe extension however this is comparable to the contralateral side  · +2/4 DP & PT pulses, Brisk Cap refill, Toes warm and perfused  · Distal sensation grossly intact to Peroneals, Sural, Saphenous, and tibial nrs    CBC:   Lab Results   Component Value Date    WBC 6.9 04/12/2021    HGB 7.6 04/12/2021    HCT 24.6 04/12/2021     04/12/2021     PT/INR:    Lab Results   Component Value Date    PROTIME 13.0 01/19/2016    PROTIME 11.1 10/21/2011    INR 1.2 01/19/2016       ASSESSMENT  · S/P right distal femur ORIF 4/10    PLAN    · Nonweightbearing right lower extremity  · Deep venous thrombosis prophylaxis -Lovenox, PCD's, early mobilization  · Hinged ROM brace ordered  · PT/OT  · Pain Control: IV  · Monitor H&H: 7.6, transfuse one unit  · DC: PT/OT/SW recs, ok from orthopedic standpoint when medically stable  · D/W attending

## 2021-04-13 NOTE — PROGRESS NOTES
[facility] Medicine    Subjective:    Doing okay  Pain is controlled  Son is by the bedside      Current Facility-Administered Medications:     0.9 % sodium chloride infusion, , Intravenous, PRN, Gibson Hind, DO    sodium chloride flush 0.9 % injection 10 mL, 10 mL, Intravenous, 2 times per day, Gibson Hind, DO, 10 mL at 04/11/21 1113    sodium chloride flush 0.9 % injection 10 mL, 10 mL, Intravenous, PRN, Gibson Hind, DO    0.9 % sodium chloride infusion, 25 mL, Intravenous, PRN, Dontae Hind, DO    senna (SENOKOT) tablet 8.6 mg, 1 tablet, Oral, Daily PRN, Dontae Hind, DO    enoxaparin (LOVENOX) injection 40 mg, 40 mg, Subcutaneous, Daily, Gibson Hind, DO, 40 mg at 04/13/21 0810    docusate sodium (COLACE) capsule 100 mg, 100 mg, Oral, BID, Dontae Hind, DO, 100 mg at 04/13/21 0810    gabapentin (NEURONTIN) capsule 100 mg, 100 mg, Oral, BID, Dontae Hind, DO, 100 mg at 04/13/21 6624    memantine (NAMENDA) tablet 10 mg, 10 mg, Oral, BID, Dontae Hind, DO, 10 mg at 04/13/21 9538    mirtazapine (REMERON) tablet 22.5 mg, 22.5 mg, Oral, Nightly, Gibson Hind, DO, 22.5 mg at 04/12/21 2221    montelukast (SINGULAIR) tablet 10 mg, 10 mg, Oral, Nightly, Gibson Hind, DO, 10 mg at 04/12/21 2222    OLANZapine (ZYPREXA) tablet 7.5 mg, 7.5 mg, Oral, Daily, Dontae Hind, DO, 7.5 mg at 04/13/21 6596    rivastigmine (EXELON) capsule 6 mg, 6 mg, Oral, BID, Dontae Hind, DO, 6 mg at 04/13/21 0631    senna (SENOKOT) tablet 8.6 mg, 1 tablet, Oral, Daily, Gibson Hind, DO, 8.6 mg at 04/13/21 8099    divalproex (DEPAKOTE) DR tablet 250 mg, 250 mg, Oral, TID, Gibson Hind, DO, 250 mg at 04/13/21 1218    sodium chloride flush 0.9 % injection 5-40 mL, 5-40 mL, Intravenous, 2 times per day, Dontae Rayo DO, 10 mL at 04/10/21 0935    sodium chloride flush 0.9 % injection 5-40 mL, 5-40 mL, Intravenous, PRN, Dontae Rayo DO, 10 mL at 04/09/21 1658    0.9 % sodium chloride infusion, 25 mL, Intravenous, PRN, Trude Polite, DO    promethazine (PHENERGAN) tablet 12.5 mg, 12.5 mg, Oral, Q6H PRN, 12.5 mg at 04/13/21 0810 **OR** ondansetron (ZOFRAN) injection 4 mg, 4 mg, Intravenous, Q6H PRN, Trude Polite, DO    polyethylene glycol (GLYCOLAX) packet 17 g, 17 g, Oral, Daily PRN, Trude Polite, DO    acetaminophen (TYLENOL) tablet 650 mg, 650 mg, Oral, Q6H PRN, 650 mg at 04/12/21 2220 **OR** acetaminophen (TYLENOL) suppository 650 mg, 650 mg, Rectal, Q6H PRN, Trude Polite, DO    morphine (PF) injection 2 mg, 2 mg, Intravenous, Q4H PRN, Trude Polite, DO, 2 mg at 04/11/21 0345    HYDROcodone-acetaminophen (NORCO) 5-325 MG per tablet 1 tablet, 1 tablet, Oral, Q4H PRN, Trude Polite, DO, 1 tablet at 04/12/21 1313    Objective:    BP (!) 140/50   Pulse 81   Temp 98.9 °F (37.2 °C) (Oral)   Resp 16   Ht 5' 5\" (1.651 m)   Wt 134 lb 9.6 oz (61.1 kg)   LMP  (LMP Unknown)   SpO2 95%   BMI 22.40 kg/m²     Heart:  reg  Lungs:  ctab  Abd: + bs soft nontender  Extrem:  Min edema legs    CBC with Differential:    Lab Results   Component Value Date    WBC 6.9 04/12/2021    RBC 2.57 04/12/2021    HGB 7.6 04/12/2021    HCT 24.6 04/12/2021     04/12/2021    MCV 95.7 04/12/2021    MCH 29.6 04/12/2021    MCHC 30.9 04/12/2021    RDW 14.2 04/12/2021    NRBC 0.0 12/24/2017    SEGSPCT 61 02/18/2012    BLASTSPCT see below 08/25/2017    METASPCT see below 08/25/2017    LYMPHOPCT 20.5 04/09/2021    LYMPHOPCT 41.0 12/24/2017    MONOPCT 7.9 04/09/2021    BASOPCT 0.3 04/09/2021    MONOSABS 0.58 04/09/2021    LYMPHSABS 1.51 04/09/2021    EOSABS 0.18 04/09/2021    BASOSABS 0.02 04/09/2021     CMP:    Lab Results   Component Value Date     04/12/2021    K 3.4 04/12/2021    K 4.5 08/18/2019     04/12/2021    CO2 27 04/12/2021    BUN 13 04/12/2021    CREATININE 0.6 04/12/2021    GFRAA >60 04/12/2021    LABGLOM >60 04/12/2021    LABGLOM >60 12/24/2017    GLUCOSE 79 04/12/2021    GLUCOSE 101 12/24/2017    PROT 6.4 04/09/2021    LABALBU 3.1 04/09/2021    LABALBU 3.2 12/24/2017    CALCIUM 8.2 04/12/2021    BILITOT 0.3 04/09/2021    BILITOT 1+ 12/19/2017    ALKPHOS 38 04/09/2021    AST 35 04/09/2021    ALT 21 04/09/2021     Warfarin PT/INR:    Lab Results   Component Value Date    INR 1.2 01/19/2016    INR 1.1 10/21/2011    PROTIME 13.0 (H) 01/19/2016    PROTIME 11.1 10/21/2011       Assessment:    Active Problems:    Closed [facility] distal epiphysis of right femur[facility]  Resolved Problems:    * No resolved hospital problems.  *  pod # 1  Postop anemia acute blood loss               Plan:  DC to subacute rehab once bed available  Check labs tomorrow  Marilin Salas  4:12 PM  4/13/2021

## 2021-04-13 NOTE — PROGRESS NOTES
OT BEDSIDE TREATMENT NOTE      Date:2021  Patient Name: Gibran Drew  MRN: [id number]  : 1933  Room: 8408/8408-A     Per OT Eval:    Evaluating OT: DELANEY Rowe, OTR/L #[id number]     Referring physician: Mina Gill DO  AM-PAC Daily Activity Raw Score:   Recommended Adaptive Equipment: TBD      Diagnosis: Closed fracture of distal epiphysis of right femur, initial encounter [facility]) [G56.206S]  Reason for Admission: Fall, s/p R femur ORIF (4/10/21)  Pertinent Medical History/Surgery: Bipolar affective disorder, cancer, chronic R sided low back pain with R sided sciatica, dementia, depression, DM, dysphagia, glaucoma, macular degeneration, muscle weakness, R knee OA, L ankle fx and surgery (), cardiac cath ()     Precautions:  Falls, R LE NWB, R hinge knee brace, anxious     Home Living: Patient is a poor historian. Per chart, patient is a resident of a long term care facility. Prior Level of Function:   Patient unable to provide PLOF. Anticipate assistance with ADLs.       Pain Level: minimal pain, occasional sharp pain with movement   Cognition: A & O x 2 grossly, able to recall fall, broken bone & R LE NWB, pt reports the medicine is making it difficult for her to think, \"I can't finish my thoughts to complete a sentence\" pleasant & cooperative, talkative this date & following commands with min-mod cues               Memory: Poor+ able to recall current situation               Sequencing:  poor              Problem solving:  poor              Judgement/safety:  poor                Functional Assessment:    Initial Eval Status  Date: 21 Treatment Status  Date:  21 Short Term Goals=LTG  Treatment frequency: PRN 2-4 x/week   Feeding Moderate Assist   Mod-Min A  Pt holding a cup to take a drink of water Set-up/Stand by Assist    Grooming Moderate Assist  Washed face using R UE with hand over hand assistance, semi-supine in bed  Min-Mod A  Washing off face & hands upright

## 2021-04-14 NOTE — PROGRESS NOTES
Hospital Medicine    Subjective:    Pain issues   Nothing else new      Current Facility-Administered Medications:     potassium chloride (KLOR-CON M) extended release tablet 40 mEq, 40 mEq, Oral, Once, Jake Rojas MD    0.9 % sodium chloride infusion, , Intravenous, PRN, Delicia Fass, DO    sodium chloride flush 0.9 % injection 10 mL, 10 mL, Intravenous, 2 times per day, Delicia Fass, DO, 10 mL at 04/13/21 2123    sodium chloride flush 0.9 % injection 10 mL, 10 mL, Intravenous, PRN, Delicia Fass, DO    0.9 % sodium chloride infusion, 25 mL, Intravenous, PRN, Delicia Fass, DO    senna (SENOKOT) tablet 8.6 mg, 1 tablet, Oral, Daily PRN, Delicia Fass, DO    enoxaparin (LOVENOX) injection 40 mg, 40 mg, Subcutaneous, Daily, Delicia Fass, DO, 40 mg at 04/14/21 6875    docusate sodium (COLACE) capsule 100 mg, 100 mg, Oral, BID, Delicia Fass, DO, 100 mg at 04/14/21 7823    gabapentin (NEURONTIN) capsule 100 mg, 100 mg, Oral, BID, Delicia Fass, DO, 100 mg at 04/14/21 5838    memantine (NAMENDA) tablet 10 mg, 10 mg, Oral, BID, Delicia Fass, DO, 10 mg at 04/14/21 7024    mirtazapine (REMERON) tablet 22.5 mg, 22.5 mg, Oral, Nightly, Delicia Fass, DO, 22.5 mg at 04/13/21 2122    montelukast (SINGULAIR) tablet 10 mg, 10 mg, Oral, Nightly, Delicia Fass, DO, 10 mg at 04/13/21 2122    OLANZapine (ZYPREXA) tablet 7.5 mg, 7.5 mg, Oral, Daily, Delicia Fass, DO, 7.5 mg at 04/14/21 9941    rivastigmine (EXELON) capsule 6 mg, 6 mg, Oral, BID, Delicia Fass, DO, 6 mg at 04/14/21 1778    senna (SENOKOT) tablet 8.6 mg, 1 tablet, Oral, Daily, Delicia Fass, DO, 8.6 mg at 04/14/21 6436    divalproex (DEPAKOTE) DR tablet 250 mg, 250 mg, Oral, TID, Delicia Wharton DO, 250 mg at 04/14/21 5347    sodium chloride flush 0.9 % injection 5-40 mL, 5-40 mL, Intravenous, 2 times per day, Delicia Wharton DO, 10 mL at 04/10/21 0935    sodium chloride flush 0.9 % injection 5-40 mL, GFRAA >60 04/14/2021    LABGLOM >60 04/14/2021    LABGLOM >60 12/24/2017    GLUCOSE 95 04/14/2021    GLUCOSE 101 12/24/2017    PROT 6.4 04/09/2021    LABALBU 3.1 04/09/2021    LABALBU 3.2 12/24/2017    CALCIUM 8.5 04/14/2021    BILITOT 0.3 04/09/2021    BILITOT 1+ 12/19/2017    ALKPHOS 38 04/09/2021    AST 35 04/09/2021    ALT 21 04/09/2021     Warfarin PT/INR:    Lab Results   Component Value Date    INR 1.2 01/19/2016    INR 1.1 10/21/2011    PROTIME 13.0 (H) 01/19/2016    PROTIME 11.1 10/21/2011       Assessment:    Active Problems:    Closed fracture of distal epiphysis of right femur, initial encounter ([facility]  Resolved Problems:    * No resolved hospital problems.  *  pod # 1  Postop anemia acute blood loss               Plan:  Await dc plan  MIGEL High Rued  4:06 PM  4/14/2021

## 2021-04-14 NOTE — PLAN OF CARE
Problem: Skin Integrity:  Goal: Will show no infection signs and symptoms  Description: Will show no infection signs and symptoms  Outcome: Met This Shift     Problem: Falls - Risk of:  Goal: Will remain free from falls  Description: Will remain free from falls  Outcome: Met This Shift     Problem: Mood - Altered:  Goal: Mood stable  Description: Mood stable  Outcome: Met This Shift

## 2021-04-14 NOTE — CARE COORDINATION
Per Theone Manly from [facility], they are able to accept patient and they precert submitted today. Voicemail message left for patient's son/prateek Franklin to inform him. Hens in envelope in soft chart. Last negative Covid-19 test was Friday 4/9. Garrett Sapp RN, CM      I received a call back from the patient's son/PRATEEK Franklin who said that he no longer wants his mother to got to [facility]. He said that [facility] now has a bed and he would like her to go there. Referral made to Chrystal Graves at [place]. They have accepted and started precert today. Theone Manly from [place] notified to pull precert. Hens in envelope in soft chart needs updated. Last negative Covid-19 test was Friday 4/9.   Garrett Sapp RN, CM

## 2021-04-14 NOTE — CARE COORDINATION
Patient is POD#4 Open reduction internal fixation right comminuted supracondylar femur fracture for Right displaced, comminuted supracondylar femur fracture. Nonweightbearing right lower extremity. Per Ann at [facility], they are not able to accept the patient. I informed the patient's son/POA Justyna Oas and he would like to try [facility]. Referral made to Nurys Villalobos at [facility], await acceptance.   Esteban Waldron RN CM

## 2021-04-14 NOTE — PROGRESS NOTES
ea, sitting balance/core stabilization, ankle pumps R LE 20x. Patient education  Pt educated on sitting balance    Patient response to education:   Pt verbalized understanding Pt demonstrated skill Pt requires further education in this area   No  No  Yes      ASSESSMENT:    Comments:  Patient cleared by RN and agreeable to treatment. Pt more alert this a.m. and able to assist with bed mobility. Pt sat EOB for over 10 minutes initially requiring ModA due to posterior lean but improved to SBA with cues. Occ LOB sitting EOB requiring Rochelle to correct. Pt returned to bed and performed ex. Pt repositioned in bed  and tray table set up due to pt's breakfast arrived. Pt given call light. Treatment:  Patient practiced and was instructed in the following treatment:     Bed mobility: Verbal/tactile cues for sequencing BUEs/BLEs for safe technique with supine<>sit task.  Sitting balance: verbal/tactile cues for posture and use of BUEs to support self in seated.  Gait training: NT   Therapeutic exercises: As noted above   Neuromuscular education:         PLAN OF CARE:    Current Treatment Recommendations     [x] Strengthening     [] ROM   [x] Balance Training   [x] Endurance Training   [x] Transfer Training   [x] Gait Training   [] Stair Training   [x] Positioning   [x] Safety and Education Training   [x] Patient/Caregiver Education   [] HEP  [] Other       PT care will be provided in accordance with the objectives noted above and progressed and updated when appropriate. Exercises and functional mobility practice will be used as well as appropriate assistive devices or modalities to obtain goals. Patient and family education will also be administered as needed. Frequency of treatments: 2-5x/week x 1-2 weeks.     Time in8:33  Time out  8;58     Total Treatment Time  25 min        CPT codes:  [] Low Complexity PT evaluation 35522  [] Moderate Complexity PT evaluation 00667  [] High Complexity PT evaluation [id number]  [] PT Re-evaluation 95486  [] Gait training 71307 - minutes  [] Manual therapy 13092 - minutes  [x] Therapeutic activities 89148 25 minutes  [] Therapeutic exercises 48263 - minutes  [] Neuromuscular reeducation 35730  minutes     Ashely Miramontes [id number]

## 2021-04-15 NOTE — PLAN OF CARE
Problem: Falls - Risk of:  Goal: Will remain free from falls  Description: Will remain free from falls  4/15/2021 0955 by Citlaly Bullard RN  Outcome: Met This Shift     Problem: Falls - Risk of:  Goal: Absence of physical injury  Description: Absence of physical injury  4/15/2021 0955 by Citlaly Bullard RN  Outcome: Met This Shift     Problem: Injury - Risk of, Physical Injury:  Goal: Will remain free from falls  Description: Will remain free from falls  4/15/2021 0955 by Citlaly Bullard RN  Outcome: Met This Shift     Problem: Injury - Risk of, Physical Injury:  Goal: Absence of physical injury  Description: Absence of physical injury  4/15/2021 0955 by Citlaly Bullard RN  Outcome: Met This Shift

## 2021-04-15 NOTE — CARE COORDINATION
Per Rupert Childers from [place] [address], precert is pending which was submitted late yesterday. Patient's son/poa Justyna Oas updated. Per internal med note today, Noted to be febrile. Check pro-calcitonin, cbc, UA. ..KUB. Hens in envelope in soft chart. Last negative Covid-19 test was Friday 4/9.    Esteban Waldron RN CM

## 2021-04-15 NOTE — PROGRESS NOTES
Physical Therapy  Treatment Note  Name: Serafin Salazar  : 1933  MRN: [id number]    Referring Provider:  Delicia Wharton DO    Date of Service: 4/15/2021    Evaluating PT:  Carina Noguera, PT, DPT    Room #:  6184/0124-J  Diagnosis:  Closed fracture of distal epiphysis R femur  PMHx/PSHx:  DM, Macular degeneration, Depression, Cancer, Bipolar affective disorder, Dementia, Glaucoma, Dysphagia, L ankle fracture surgery 2016  Procedure/Surgery:  4/10/2021 RIGHT FEMUR OPEN REDUCTION INTERNAL FIXATION   Precautions:  Falls, NWB RLE, Hinged brace 0-15  Equipment Needs:  TBD    SUBJECTIVE:    Pt comes from [facility]. Pt poor/questionable historian and reported not getting out of bed at the facility, then reported getting up every day for meals. It is not clear prior level of function. OBJECTIVE:   Initial Evaluation  Date: 2021 Treatment  4/15 Short Term/ Long Term   Goals   AM-PAC 6 Clicks     Was pt agreeable to Eval/treatment? Yes with much encouragement. Yes     Does pt have pain? Cried out in pain with mobility of RLE, did not quantify. Unable to provide number when asked. Pt c/o abdominal discomfort and RLE pain     Bed Mobility  Rolling: Dependent  Supine to sit: Dependent +2  Sit to supine: Dependent +2  Scooting: Dependent Rolling Dep   Supine to sit Max A  Sit to supine Max A  Scooting: Max A  Max A   Transfers Sit to stand: NT  Stand to sit: NT  Stand pivot: NT NT Max A +1-2   Ambulation    NT NT >5 feet with WW with Max A +2   Stair negotiation: ascended and descended  NT NT NA   ROM BUE:  Shoulder ROM limited 50% by pain  BLE:  LLE WFL, RLE limted by pain BLE:  LLE WFL, RLE limted by pain    Strength BUE:  3-/5  BLE:  RLE wiggled toes, LLE 3-/5 RLE: 2/5  LLE: 3-/5 Increase 1/3 grade MMT   Balance Sitting EOB:  Mod A  Dynamic Standing:  NT Sitting EOB:  Min A<>SBA with positioning Sitting EOB:  Min A  Dynamic Standing: Max A +2     Pt is A & O x 1, self only.      Therapeutic Exercises: Will continue with current POC.       Time in 1015  Time out 1040    Total Treatment Time  25 min      CPT codes:  [] Low Complexity PT evaluation 30317  [] Moderate Complexity PT evaluation 13214  [] High Complexity PT evaluation 20775  [] PT Re-evaluation 20017  [] Gait training 16595 - minutes  [] Manual therapy 58919 - minutes  [x] Therapeutic activities 91560 25 minutes  [] Therapeutic exercises 51002 - minutes  [] Neuromuscular reeducation 80716  minutes     Brandie Berry, PT, DPT  License [id number]

## 2021-04-15 NOTE — PLAN OF CARE
Problem: Skin Integrity:  Goal: Will show no infection signs and symptoms  Description: Will show no infection signs and symptoms  4/14/2021 2208 by Shawna Rivera RN  Outcome: Met This Shift     Problem: Skin Integrity:  Goal: Absence of new skin breakdown  Description: Absence of new skin breakdown  Outcome: Met This Shift     Problem: Falls - Risk of:  Goal: Will remain free from falls  Description: Will remain free from falls  Outcome: Met This Shift     Problem: Falls - Risk of:  Goal: Absence of physical injury  Description: Absence of physical injury  Outcome: Met This Shift     Problem: Injury - Risk of, Physical Injury:  Goal: Will remain free from falls  Description: Will remain free from falls  Outcome: Met This Shift     Problem: Injury - Risk of, Physical Injury:  Goal: Absence of physical injury  Description: Absence of physical injury  Outcome: Met This Shift

## 2021-04-15 NOTE — PROGRESS NOTES
Hospital Medicine    Subjective:      Complaining of supra pubic pain  Some nausea as well        Current Facility-Administered Medications:     0.9 % sodium chloride infusion, , Intravenous, PRN, Sherle Custard, DO    sodium chloride flush 0.9 % injection 10 mL, 10 mL, Intravenous, 2 times per day, Sherle Custard, DO, 10 mL at 04/13/21 2123    sodium chloride flush 0.9 % injection 10 mL, 10 mL, Intravenous, PRN, Sherle Custard, DO    0.9 % sodium chloride infusion, 25 mL, Intravenous, PRN, Sherle Custard, DO    senna (SENOKOT) tablet 8.6 mg, 1 tablet, Oral, Daily PRN, Sherle Custard, DO    enoxaparin (LOVENOX) injection 40 mg, 40 mg, Subcutaneous, Daily, Sherle Custard, DO, 40 mg at 04/15/21 0831    docusate sodium (COLACE) capsule 100 mg, 100 mg, Oral, BID, Sherle Custard, DO, 100 mg at 04/15/21 0845    gabapentin (NEURONTIN) capsule 100 mg, 100 mg, Oral, BID, Sherle Custard, DO, 100 mg at 04/15/21 0831    memantine (NAMENDA) tablet 10 mg, 10 mg, Oral, BID, Sherle Custard, DO, 10 mg at 04/15/21 9553    mirtazapine (REMERON) tablet 22.5 mg, 22.5 mg, Oral, Nightly, Sherle Custard, DO, 22.5 mg at 04/14/21 2117    montelukast (SINGULAIR) tablet 10 mg, 10 mg, Oral, Nightly, Sherle Custard, DO, 10 mg at 04/14/21 2117    OLANZapine (ZYPREXA) tablet 7.5 mg, 7.5 mg, Oral, Daily, Sherle Custard, DO, 7.5 mg at 04/15/21 7070    rivastigmine (EXELON) capsule 6 mg, 6 mg, Oral, BID, Sherle Custard, DO, 6 mg at 04/15/21 9538    senna (SENOKOT) tablet 8.6 mg, 1 tablet, Oral, Daily, Sherle Custard, DO, 8.6 mg at 04/15/21 0831    divalproex (DEPAKOTE) DR tablet 250 mg, 250 mg, Oral, TID, Singh Giordano DO, 250 mg at 04/15/21 0815    sodium chloride flush 0.9 % injection 5-40 mL, 5-40 mL, Intravenous, 2 times per day, Singh Giordano DO, 10 mL at 04/10/21 0935    sodium chloride flush 0.9 % injection 5-40 mL, 5-40 mL, Intravenous, PRN, Singh Giordano DO, 10 mL at 04/09/21 1658    0.9 % sodium chloride infusion, 25 mL, Intravenous, PRN, Taisha Cyndy, DO    promethazine (PHENERGAN) tablet 12.5 mg, 12.5 mg, Oral, Q6H PRN, 12.5 mg at 04/15/21 0838 **OR** ondansetron (ZOFRAN) injection 4 mg, 4 mg, Intravenous, Q6H PRN, Taisha Winchendon, DO    polyethylene glycol (GLYCOLAX) packet 17 g, 17 g, Oral, Daily PRN, Taisha Winchendon, DO    acetaminophen (TYLENOL) tablet 650 mg, 650 mg, Oral, Q6H PRN, 650 mg at 04/14/21 2117 **OR** acetaminophen (TYLENOL) suppository 650 mg, 650 mg, Rectal, Q6H PRN, Taisha Cyndy, DO, 650 mg at 04/15/21 4869    morphine (PF) injection 2 mg, 2 mg, Intravenous, Q4H PRN, Taisha Winchendon, DO, 2 mg at 04/11/21 0345    HYDROcodone-acetaminophen (NORCO) 5-325 MG per tablet 1 tablet, 1 tablet, Oral, Q4H PRN, Taisha Cyndy, DO, 1 tablet at 04/14/21 0850    Objective:    BP (!) 169/83   Pulse 88   Temp 100.2 °F (37.9 °C) (Axillary)   Resp 18   Ht 5' 5\" (1.651 m)   Wt 134 lb 9.6 oz (61.1 kg)   LMP  (LMP Unknown)   SpO2 94%   BMI 22.40 kg/m²     Heart:  reg  Lungs:  ctab  Abd: + bs soft nontender  Extrem:  Min edema legs    CBC with Differential:    Lab Results   Component Value Date    WBC 6.1 04/14/2021    RBC 3.55 04/14/2021    HGB 10.6 04/14/2021    HCT 33.1 04/14/2021     04/14/2021    MCV 93.2 04/14/2021    MCH 29.9 04/14/2021    MCHC 32.0 04/14/2021    RDW 14.8 04/14/2021    NRBC 0.0 12/24/2017    SEGSPCT 61 02/18/2012    BLASTSPCT see below 08/25/2017    METASPCT see below 08/25/2017    LYMPHOPCT 32.2 04/14/2021    LYMPHOPCT 41.0 12/24/2017    MONOPCT 11.7 04/14/2021    BASOPCT 0.5 04/14/2021    MONOSABS 0.71 04/14/2021    LYMPHSABS 1.96 04/14/2021    EOSABS 0.26 04/14/2021    BASOSABS 0.03 04/14/2021     CMP:    Lab Results   Component Value Date     04/14/2021    K 3.3 04/14/2021    K 4.5 08/18/2019     04/14/2021    CO2 27 04/14/2021    BUN 7 04/14/2021    CREATININE 0.5 04/14/2021    GFRAA >60 04/14/2021    LABGLOM >60 04/14/2021    LABGLOM >60 12/24/2017    GLUCOSE 95 04/14/2021    GLUCOSE 101 12/24/2017    PROT 6.4 04/09/2021    LABALBU 3.1 04/09/2021    LABALBU 3.2 12/24/2017    CALCIUM 8.5 04/14/2021    BILITOT 0.3 04/09/2021    BILITOT 1+ 12/19/2017    ALKPHOS 38 04/09/2021    AST 35 04/09/2021    ALT 21 04/09/2021     Warfarin PT/INR:    Lab Results   Component Value Date    INR 1.2 01/19/2016    INR 1.1 10/21/2011    PROTIME 13.0 (H) 01/19/2016    PROTIME 11.1 10/21/2011       Assessment:    Active Problems:    Closed fracture of distal epiphysis of right femur[facility]  Resolved Problems:    * No resolved hospital problems.  *  pod # 1  Postop anemia acute blood loss               Plan:  Noted to be febrile  Check pro-calcitonin, cbc  UA  Hold dc today  IGOR Howell  9:57 AM  4/15/2021

## 2021-04-16 NOTE — PLAN OF CARE
Problem: Skin Integrity:  Goal: Will show no infection signs and symptoms  Description: Will show no infection signs and symptoms  4/16/2021 1014 by Josey Sousa RN  Outcome: Met This Shift  4/16/2021 0015 by Conner Brown RN  Outcome: Met This Shift  Goal: Absence of new skin breakdown  Description: Absence of new skin breakdown  4/16/2021 1014 by Josey Sousa RN  Outcome: Met This Shift  4/16/2021 0015 by Conner Brown RN  Outcome: Met This Shift     Problem: Falls - Risk of:  Goal: Will remain free from falls  Description: Will remain free from falls  4/16/2021 1014 by Josey Sousa RN  Outcome: Met This Shift  4/16/2021 0015 by Conner Brown RN  Outcome: Met This Shift  Goal: Absence of physical injury  Description: Absence of physical injury  4/16/2021 1014 by Josey Sousa RN  Outcome: Met This Shift  4/16/2021 0015 by Conner Brown RN  Outcome: Met This Shift     Problem: Confusion - Acute:  Goal: Absence of continued neurological deterioration signs and symptoms  Description: Absence of continued neurological deterioration signs and symptoms  Outcome: Met This Shift  Goal: Mental status will be restored to baseline  Description: Mental status will be restored to baseline  Outcome: Met This Shift     Problem: Discharge Planning:  Goal: Ability to perform activities of daily living will improve  Description: Ability to perform activities of daily living will improve  Outcome: Met This Shift  Goal: Participates in care planning  Description: Participates in care planning  Outcome: Met This Shift     Problem: Injury - Risk of, Physical Injury:  Goal: Will remain free from falls  Description: Will remain free from falls  4/16/2021 1014 by Josey Sousa RN  Outcome: Met This Shift  4/16/2021 0015 by Conner Brown RN  Outcome: Met This Shift  Goal: Absence of physical injury  Description: Absence of physical injury  4/16/2021 1014 by Josey Sousa RN  Outcome: Met This Shift  4/16/2021 0015 by Conner Brown RN  Outcome: Met This Shift     Problem: Mood - Altered:  Goal: Mood stable  Description: Mood stable  Outcome: Met This Shift  Goal: Absence of abusive behavior  Description: Absence of abusive behavior  Outcome: Met This Shift  Goal: Verbalizations of feeling emotionally comfortable while being cared for will increase  Description: Verbalizations of feeling emotionally comfortable while being cared for will increase  Outcome: Met This Shift     Problem: Psychomotor Activity - Altered:  Goal: Absence of psychomotor disturbance signs and symptoms  Description: Absence of psychomotor disturbance signs and symptoms  Outcome: Met This Shift     Problem: Sensory Perception - Impaired:  Goal: Demonstrations of improved sensory functioning will increase  Description: Demonstrations of improved sensory functioning will increase  Outcome: Met This Shift  Goal: Decrease in sensory misperception frequency  Description: Decrease in sensory misperception frequency  Outcome: Met This Shift  Goal: Able to refrain from responding to false sensory perceptions  Description: Able to refrain from responding to false sensory perceptions  Outcome: Met This Shift  Goal: Demonstrates accurate environmental perceptions  Description: Demonstrates accurate environmental perceptions  Outcome: Met This Shift  Goal: Able to distinguish between reality-based and nonreality-based thinking  Description: Able to distinguish between reality-based and nonreality-based thinking  Outcome: Met This Shift  Goal: Able to interrupt nonreality-based thinking  Description: Able to interrupt nonreality-based thinking  Outcome: Met This Shift     Problem: Sleep Pattern Disturbance:  Goal: Appears well-rested  Description: Appears well-rested  Outcome: Met This Shift     Problem: Pain:  Goal: Pain level will decrease  Description: Pain level will decrease  Outcome: Met This Shift  Goal: Control of acute pain  Description: Control of acute pain  Outcome:  Met This Shift  Goal: Control of chronic pain  Description: Control of chronic pain  Outcome: Met This Shift

## 2021-04-16 NOTE — PROGRESS NOTES
Comprehensive Nutrition Assessment    Type and Reason for Visit:  Initial    Nutrition Recommendations/Plan: Continue current diet, Start Ensure BID    Nutrition Assessment:  Pt nutritionally at risk, adm w/ femur fx s/p ORIF. Hx dementia, DM. Will provide ONS and monitor    Malnutrition Assessment:  Malnutrition Status: At risk for malnutrition (Comment)    Context:  Acute Illness     Findings of the 6 clinical characteristics of malnutrition:  Energy Intake:  7 - 50% or less of estimated energy requirements for 5 or more days  Weight Loss:  Unable to assess(d/t lack of wt hx no file)     Body Fat Loss:  No significant body fat loss     Muscle Mass Loss:  No significant muscle mass loss    Fluid Accumulation:  No significant fluid accumulation     Strength:  Not Performed    Estimated Daily Nutrient Needs:  Energy (kcal):  (MSJ 1044 x 1.2 SF); Weight Used for Energy Requirements:  Current     Protein (g):  75-85; Weight Used for Protein Requirements:  Current(1.2-1.4)        Fluid (ml/day):  ; Method Used for Fluid Requirements:  1 ml/kcal      Nutrition Related Findings:  dementia, abd WDL, no noted edema, -I/Os      Wounds:  Surgical Incision       Current Nutrition Therapies:    DIET GENERAL;     Anthropometric Measures:  · Height: 5' 5\" (165.1 cm)  · Current Body Weight: 134 lb (60.8 kg)(bed scale 4/12)   · Usual Body Weight: (UTO no recent actual wt hx per EMR)     · Ideal Body Weight: 125 lbs; % Ideal Body Weight 107.2 %   · BMI: 22.3  · BMI Categories: Normal Weight (BMI 22.0 to 24.9) age over 72       Nutrition Diagnosis:   · Inadequate oral intake related to cognitive or neurological impairment as evidenced by intake 0-25%      Nutrition Interventions:   Food and/or Nutrient Delivery:  Continue Current Diet, Start Oral Nutrition Supplement(Ensure BID)  Nutrition Education/Counseling:  Education not indicated   Coordination of Nutrition Care:  Continue to monitor while inpatient    Goals:  Consume >50% meals/ONS       Nutrition Monitoring and Evaluation:   Food/Nutrient Intake Outcomes:  Diet Advancement/Tolerance, Food and Nutrient Intake, Supplement Intake  Physical Signs/Symptoms Outcomes:  Biochemical Data, GI Status, Fluid Status or Edema, Nutrition Focused Physical Findings, Skin, Weight     Discharge Planning:     Too soon to determine     Electronically signed by Del Jacobsen, MS, RD, LD on 4/16/21 at 1:17 PM EDT    Contact: [phone]

## 2021-04-16 NOTE — CARE COORDINATION
Per Rishabh Ford from [place] [address], precert is still pending which was started late Wednesday. Patient placed on the Sunday therapy list. Hens and ambulance form in envelope in soft chart. Patient will need an updated negative Covid-19 test prior to discharging. Rapid test kit given to RN to do today.   Zoila Rossi RN CM

## 2021-04-16 NOTE — PROGRESS NOTES
Physical Therapy  Treatment Note  Name: Radha Multani  : 1933  MRN: [id number]    Referring Provider:  Clifton Clark DO    Date of Service: 2021    Evaluating PT:  Isabela Nieves PT, DPT    Room #:  6988/4858-Y  Diagnosis:  Closed fracture of distal epiphysis R femur  PMHx/PSHx:  DM, Macular degeneration, Depression, Cancer, Bipolar affective disorder, Dementia, Glaucoma, Dysphagia, L ankle fracture surgery 2016  Procedure/Surgery:  4/10/2021 RIGHT FEMUR OPEN REDUCTION INTERNAL FIXATION   Precautions:  Falls, NWB RLE, Hinged brace 0-15  Equipment Needs:  TBD    SUBJECTIVE:    Pt comes from [facility]. Pt poor/questionable historian and reported not getting out of bed at the facility, then reported getting up every day for meals. It is not clear prior level of function. OBJECTIVE:   Initial Evaluation  Date: 2021 Treatment  2021 Short Term/ Long Term   Goals   AM-PAC 6 Clicks     Was pt agreeable to Eval/treatment? Yes with much encouragement. Yes with encouragement. Does pt have pain? Cried out in pain with mobility of RLE, did not quantify. Unable to provide number when asked. Pt reported RLE pain with mobility. Did not quantify. Bed Mobility  Rolling: Dependent  Supine to sit: Dependent +2  Sit to supine: Dependent +2  Scooting: Dependent Rolling: Max A  Supine to sit: Max A  Sit to supine: Max A  Scooting: Max A Max A   Transfers Sit to stand: NT  Stand to sit: NT  Stand pivot: NT Attempted, unable Max A +1-2   Ambulation    NT NT >5 feet with WW with Max A +2   Stair negotiation: ascended and descended  NT NT NA   ROM BUE:  Shoulder ROM limited 50% by pain  BLE:  LLE WFL, RLE limted by pain     Strength BUE:  3-/5  BLE:  RLE wiggled toes, LLE 3-/5  Increase 1/3 grade MMT   Balance Sitting EOB:  Mod A  Dynamic Standing:  NT Sitting EOB: SBA Sitting EOB:  Min A  Dynamic Standing: Max A +2     Pt is A & O x 2, self and place.      Therapeutic Exercises:   LAQs LLE 20x ea, APs 20x ea, Core stabilization x 10 min    Patient education  Pt educated on importance of mobility, safety with mobility, sitting balance/posture    Patient response to education:   Pt verbalized understanding Pt demonstrated skill Pt requires further education in this area   No  No  Yes      ASSESSMENT:    Comments:  Patient cleared by RN and agreeable to treatment with encouragement. Patient found in semi Soliman's on left turn via TAPs. Patient educated on WB restrictions prior to mobility. Patient not able to move RLE without assist, but once lifted able to swing to the EOB. Patient assisted with reaching for the bed rail to roll to the left, and then with trunk righting to achieve seated EOB. Patient reported pain in RLE in dependent position that decreased once supported by PT. Attempted to stand and patient resistive and not able to complete and maintain NWB of RLE and returned to seated. Patient reported increased pain and nausea and requested to return to supine. Performed seated scoot to position to the [address] prior to assisting back to supine. Patient positioned to the [address] and placed on left turn via TAPS as found. Call light and tray table in reach. Treatment:  Patient practiced and was instructed in the following treatment:     Bed mobility training - pt given verbal and tactile cues to facilitate proper sequencing and safety during rolling and supine<>sit as well as provided with physical assistance for  to complete task    Sitting Balance EOB for >10 minutes for improved postural awareness, upright tolerance, effective breathing and decreased in dizziness. Assist to maintain upright posture and to correct retropulsive lean.  Transfer training with VCs for hand placement, sequencing and technique. Pt unable to complete. · Therapeutic Exercises/Activities as noted above. PLAN OF CARE:  Patient is making limited progress towards goals. Will continue with current POC.       Time in 0268  Time out 1020    Total Treatment Time  24 min      CPT codes:  [] Low Complexity PT evaluation 60173  [] Moderate Complexity PT evaluation 57142  [] High Complexity PT evaluation 73111  [] PT Re-evaluation 77522  [] Gait training 44538 - minutes  [] Manual therapy 52269 - minutes  [x] Therapeutic activities 40504 24 minutes  [] Therapeutic exercises 15460 - minutes  [] Neuromuscular reeducation 79159  minutes     Sue Hughes, PT, DPT  License [id number]

## 2021-04-16 NOTE — PROGRESS NOTES
[facility] Medicine    Subjective:      Feeling about the same      Current Facility-Administered Medications:     hydrOXYzine (VISTARIL) capsule 25 mg, 25 mg, Oral, Q8H PRN, Ebenezer Garcia MD, 25 mg at 04/16/21 1458    0.9 % sodium chloride infusion, , Intravenous, PRN, Erika Cruel, DO    sodium chloride flush 0.9 % injection 10 mL, 10 mL, Intravenous, 2 times per day, Erika Cruel, DO, 10 mL at 04/16/21 0820    sodium chloride flush 0.9 % injection 10 mL, 10 mL, Intravenous, PRN, Erika Cruel, DO    0.9 % sodium chloride infusion, 25 mL, Intravenous, PRN, Erika Cruel, DO    senna (SENOKOT) tablet 8.6 mg, 1 tablet, Oral, Daily PRN, Erika Cruel, DO    enoxaparin (LOVENOX) injection 40 mg, 40 mg, Subcutaneous, Daily, Reika Cruel, DO, 40 mg at 04/16/21 3334    docusate sodium (COLACE) capsule 100 mg, 100 mg, Oral, BID, Erika Cruel, DO, 100 mg at 04/16/21 3771    gabapentin (NEURONTIN) capsule 100 mg, 100 mg, Oral, BID, Erika Cruel, DO, 100 mg at 04/16/21 0820    memantine (NAMENDA) tablet 10 mg, 10 mg, Oral, BID, Erika Cruel, DO, 10 mg at 04/16/21 2255    mirtazapine (REMERON) tablet 22.5 mg, 22.5 mg, Oral, Nightly, Erika Cruel, DO, 22.5 mg at 04/14/21 2117    montelukast (SINGULAIR) tablet 10 mg, 10 mg, Oral, Nightly, Erika Cruel, DO, 10 mg at 04/14/21 2117    OLANZapine (ZYPREXA) tablet 7.5 mg, 7.5 mg, Oral, Daily, Erika Cruel, DO, 7.5 mg at 04/16/21 6918    rivastigmine (EXELON) capsule 6 mg, 6 mg, Oral, BID, Erika Cruel, DO, 6 mg at 04/16/21 8246    senna (SENOKOT) tablet 8.6 mg, 1 tablet, Oral, Daily, Erika Ross DO, 8.6 mg at 04/16/21 0820    divalproex (DEPAKOTE) DR tablet 250 mg, 250 mg, Oral, TID, Erika Ross DO, 250 mg at 04/16/21 1435    sodium chloride flush 0.9 % injection 5-40 mL, 5-40 mL, Intravenous, 2 times per day, Erika Ross DO, 10 mL at 04/10/21 0935    sodium chloride flush 0.9 % injection 5-40 mL, 5-40 mL, Intravenous, PRN, Verdia Burrs, DO, 10 mL at 04/09/21 1658    0.9 % sodium chloride infusion, 25 mL, Intravenous, PRN, Verdia Burrs, DO    promethazine (PHENERGAN) tablet 12.5 mg, 12.5 mg, Oral, Q6H PRN, 12.5 mg at 04/15/21 0838 **OR** ondansetron (ZOFRAN) injection 4 mg, 4 mg, Intravenous, Q6H PRN, Verdia Burrs, DO    polyethylene glycol (GLYCOLAX) packet 17 g, 17 g, Oral, Daily PRN, Verdia Burrs, DO    acetaminophen (TYLENOL) tablet 650 mg, 650 mg, Oral, Q6H PRN, 650 mg at 04/16/21 0257 **OR** acetaminophen (TYLENOL) suppository 650 mg, 650 mg, Rectal, Q6H PRN, Verdia Burrs, DO, 650 mg at 04/15/21 8664    morphine (PF) injection 2 mg, 2 mg, Intravenous, Q4H PRN, Verdia Burrs, DO, 2 mg at 04/11/21 0345    HYDROcodone-acetaminophen (NORCO) 5-325 MG per tablet 1 tablet, 1 tablet, Oral, Q4H PRN, Verdia Burrs, DO, 1 tablet at 04/14/21 9112    Objective:    BP (!) 149/74   Pulse 65   Temp 98.5 °F (36.9 °C) (Temporal)   Resp 14   Ht 5' 5\" (1.651 m)   Wt 134 lb 9.6 oz (61.1 kg)   LMP  (LMP Unknown)   SpO2 95%   BMI 22.40 kg/m²     Heart:  reg  Lungs:  ctab  Abd: + bs soft nontender  Extrem:  Min edema legs    CBC with Differential:    Lab Results   Component Value Date    WBC 5.3 04/15/2021    RBC 3.86 04/15/2021    HGB 11.5 04/15/2021    HCT 35.8 04/15/2021     04/15/2021    MCV 92.7 04/15/2021    MCH 29.8 04/15/2021    MCHC 32.1 04/15/2021    RDW 14.6 04/15/2021    NRBC 0.0 12/24/2017    SEGSPCT 61 02/18/2012    BLASTSPCT see below 08/25/2017    METASPCT see below 08/25/2017    LYMPHOPCT 18.1 04/15/2021    LYMPHOPCT 41.0 12/24/2017    MONOPCT 12.3 04/15/2021    BASOPCT 0.6 04/15/2021    MONOSABS 0.65 04/15/2021    LYMPHSABS 0.96 04/15/2021    EOSABS 0.18 04/15/2021    BASOSABS 0.03 04/15/2021     CMP:    Lab Results   Component Value Date     04/14/2021    K 3.3 04/14/2021    K 4.5 08/18/2019     04/14/2021    CO2 27 04/14/2021    BUN 7 04/14/2021    CREATININE 0.5 04/14/2021    GFRAA >60 04/14/2021    LABGLOM >60 04/14/2021    LABGLOM >60 12/24/2017    GLUCOSE 95 04/14/2021    GLUCOSE 101 12/24/2017    PROT 6.4 04/09/2021    LABALBU 3.1 04/09/2021    LABALBU 3.2 12/24/2017    CALCIUM 8.5 04/14/2021    BILITOT 0.3 04/09/2021    BILITOT 1+ 12/19/2017    ALKPHOS 38 04/09/2021    AST 35 04/09/2021    ALT 21 04/09/2021     Warfarin PT/INR:    Lab Results   Component Value Date    INR 1.2 01/19/2016    INR 1.1 10/21/2011    PROTIME 13.0 (H) 01/19/2016    PROTIME 11.1 10/21/2011       Assessment:    Active Problems:    Closed fracture of distal epiphysis of right femur, initial encounter ([facility]  Resolved Problems:    * No resolved hospital problems.  *    Postop anemia acute blood loss   UTI               Plan:  Procalcitonin is low  KUB is okay  UA shows possible cystitis  We will treat for cystitis  Await discharge planning  Okay for discharge if bed available    Peter Hammer  4:38 PM  4/16/2021

## 2021-04-16 NOTE — DISCHARGE INSTR - COC
Continuity of Care Form    Patient Name: Campbell Deshpande   :  1933  MRN:  [id number]    Admit date:  2021  Discharge date:  ***    Code Status Order: [facility]   Advance Directives:   [facility] Documentation       Date/Time Healthcare Directive Type of Healthcare Directive Copy in [address] Agent's Name Healthcare Agent's Phone Number    [phone]  Yes, patient has an advance directive for healthcare treatment  --  --  --  --  --            Admitting Physician:  Blaise Liu MD  PCP: Blaise Liu MD    Discharging Nurse: [facility] Unit/Room#: 8343/0782-Z  Discharging Unit Phone Number: ***    Emergency Contact:   Extended Emergency Contact Information  Primary Emergency Contact: Jose Moon)  Address: [address] [place] [address] [address]  Phone: [phone]  Mobile Phone: [phone]  Relation: Child  Secondary Emergency Contact: Jori Tripathii  Address: [address] [place] [address] [address]  Phone: [phone]  Mobile Phone: [phone]  Relation: Other    Past Surgical History:  Past Surgical History:   Procedure Laterality Date    ANKLE FRACTURE SURGERY Left [id number]    APPENDECTOMY      CARDIAC CATHETERIZATION  10/21/2011    Dr. Dottie Santos Right 4/10/2021    RIGHT FEMUR OPEN REDUCTION INTERNAL FIXATION performed by John Lopez MD at [place] [address] ENDOSCOPY  16    DR Keysha Varner       Immunization History: There is no immunization history on file for this patient.     Active Problems:  Patient Active Problem List   Diagnosis Code    Urinary tract infection without hematuria N39.0    Cardiac standstill (HCC) I46.9    Bipolar affective disorder, current episode manic with psychotic symptoms ([facility]) F31.2    Depression F32.9    Anxiety disorder F41.9    Gastroesophageal reflux disease K21.9    Macular degeneration, bilateral H35.30    Primary osteoarthritis of right knee M17.11    Chronic right-sided low back pain with right-sided sciatica M54.41, G89.29    Tinea unguium B35.1    Type II diabetes mellitus ([facility]) E11.9    [place] or callus L84    Pain in left toe(s) M79.675    Pain in toe of right foot M79.674    Difficulty walking R26.2    Peripheral vascular disease, unspecified ([facility]) I73.9    Contusion of left great toe without damage to nail S90.112A    Displaced comminuted fracture of shaft of right femur, init ([facility]) S72.351A    Closed fracture of distal epiphysis of right femur, [facility] S72.441A       Isolation/Infection:   Isolation            No Isolation          Patient Infection Status       Infection Onset Added Last Indicated Last Indicated By Review Planned Expiration Resolved Resolved By    None active    Resolved    COVID-19 Rule Out 04/16/21 04/16/21 04/16/21 COVID-19, Rapid (Ordered)   04/16/21 Rule-Out Test Resulted    COVID-19 Rule Out 04/09/21 04/09/21 04/09/21 COVID-19, Rapid (Ordered)   04/09/21 Rule-Out Test Resulted            Nurse Assessment:  Last Vital Signs: BP (!) 149/74   Pulse 65   Temp 98.5 °F (36.9 °C) (Temporal)   Resp 14   Ht 5' 5\" (1.651 m)   Wt 134 lb 9.6 oz (61.1 kg)   LMP  (LMP Unknown)   SpO2 95%   BMI 22.40 kg/m²     Last documented pain score (0-10 scale): Pain Level: 4  Last Weight:   Wt Readings from Last 1 Encounters:   04/12/21 134 lb 9.6 oz (61.1 kg)     Mental Status:  disoriented and alert    IV Access:  - None    Nursing Mobility/ADLs:  Walking   Assisted  Transfer  Assisted  Bathing  Assisted  Dressing  Assisted  Toileting  Assisted  Feeding  Assisted  Med Admin  Assisted  Med Delivery   whole    Wound Care Documentation and Therapy:        Elimination:  Continence:    Bowel: No  Bladder: No  Urinary Catheter: None the above information and transfer of Gisselle Pike  is necessary for the continuing treatment of the diagnosis listed and that she requires {Admit to Appropriate Level of Care:04220:::0} for {GREATER/LESS:[id number]} 30 days.      Update Admission H&P: No change in H&P    PHYSICIAN SIGNATURE:  Electronically signed by Hodan Stark MD on 4/19/21 at 10:40 AM EDT

## 2021-04-17 NOTE — PROGRESS NOTES
Hospital Medicine    Subjective:  Pt alert responsive in no distress      Current Facility-Administered Medications:     hydrOXYzine (VISTARIL) capsule 25 mg, 25 mg, Oral, Q8H PRN, Faiza Orta MD, 25 mg at 04/17/21 0800    ciprofloxacin (CIPRO) tablet 250 mg, 250 mg, Oral, 2 times per day, Faiza Orta MD, 250 mg at 04/17/21 0801    0.9 % sodium chloride infusion, , Intravenous, PRN, Lavera Creek, DO    sodium chloride flush 0.9 % injection 10 mL, 10 mL, Intravenous, 2 times per day, Lavera Creek, DO, 10 mL at 04/16/21 0820    sodium chloride flush 0.9 % injection 10 mL, 10 mL, Intravenous, PRN, Lavera [place], DO    0.9 % sodium chloride infusion, 25 mL, Intravenous, PRN, Lavera Creek, DO    senna (SENOKOT) tablet 8.6 mg, 1 tablet, Oral, Daily PRN, Lavera [place], DO    enoxaparin (LOVENOX) injection 40 mg, 40 mg, Subcutaneous, Daily, Lavera [place], DO, 40 mg at 04/17/21 0800    docusate sodium (COLACE) capsule 100 mg, 100 mg, Oral, BID, Lavera [place], DO, 100 mg at 04/17/21 0800    gabapentin (NEURONTIN) capsule 100 mg, 100 mg, Oral, BID, Lavera [place], DO, 100 mg at 04/17/21 0759    memantine (NAMENDA) tablet 10 mg, 10 mg, Oral, BID, Lavera Creek, DO, 10 mg at 04/17/21 0800    mirtazapine (REMERON) tablet 22.5 mg, 22.5 mg, Oral, Nightly, Lavera Creek, DO, 22.5 mg at 04/16/21 1959    montelukast (SINGULAIR) tablet 10 mg, 10 mg, Oral, Nightly, Lavera Creek, DO, 10 mg at 04/16/21 1958    OLANZapine (ZYPREXA) tablet 7.5 mg, 7.5 mg, Oral, Daily, Lavera [place], DO, 7.5 mg at 04/17/21 0800    rivastigmine (EXELON) capsule 6 mg, 6 mg, Oral, BID, Lavera Creek, DO, 6 mg at 04/17/21 0800    senna (SENOKOT) tablet 8.6 mg, 1 tablet, Oral, Daily, Lavera [place], DO, 8.6 mg at 04/17/21 0759    divalproex (DEPAKOTE) DR tablet 250 mg, 250 mg, Oral, TID, Lavera [place], DO, 250 mg at 04/17/21 0800    sodium chloride flush 0.9 % injection 5-40 mL, 5-40 mL, Intravenous, 2 times per day, Lavera Creek, DO, 10 mL at 04/10/21 0935    sodium chloride flush 0.9 % injection 5-40 mL, 5-40 mL, Intravenous, PRN, Lavera Creek, DO, 10 mL at 04/09/21 1658    0.9 % sodium chloride infusion, 25 mL, Intravenous, PRN, Lavera Creek, DO    promethazine (PHENERGAN) tablet 12.5 mg, 12.5 mg, Oral, Q6H PRN, 12.5 mg at 04/15/21 0838 **OR** ondansetron (ZOFRAN) injection 4 mg, 4 mg, Intravenous, Q6H PRN, Lavera Qagan Tayagungin, DO    polyethylene glycol (GLYCOLAX) packet 17 g, 17 g, Oral, Daily PRN, Lavera Qagan Tayagungin, DO    acetaminophen (TYLENOL) tablet 650 mg, 650 mg, Oral, Q6H PRN, 650 mg at 04/16/21 0257 **OR** acetaminophen (TYLENOL) suppository 650 mg, 650 mg, Rectal, Q6H PRN, Lavera Qagan Tayagungin, DO, 650 mg at 04/15/21 5965    morphine (PF) injection 2 mg, 2 mg, Intravenous, Q4H PRN, Lavera Creek, DO, 2 mg at 04/11/21 0345    HYDROcodone-acetaminophen (NORCO) 5-325 MG per tablet 1 tablet, 1 tablet, Oral, Q4H PRN, Lavera Qagan Tayagungin, DO, 1 tablet at 04/16/21 1958    Objective:    BP (!) 165/77   Pulse 93   Temp 98.8 °F (37.1 °C) (Temporal)   Resp 15   Ht 5' 5\" (1.651 m)   Wt 134 lb 9.6 oz (61.1 kg)   LMP  (LMP Unknown)   SpO2 93%   BMI 22.40 kg/m²     Heart:  Reg  Lungs:  ctab  Abd: + bs soft nontender  Extrem:  W/o edema    CBC with Differential:    Lab Results   Component Value Date    WBC 5.3 04/15/2021    RBC 3.86 04/15/2021    HGB 11.5 04/15/2021    HCT 35.8 04/15/2021     04/15/2021    MCV 92.7 04/15/2021    MCH 29.8 04/15/2021    MCHC 32.1 04/15/2021    RDW 14.6 04/15/2021    NRBC 0.0 12/24/2017    SEGSPCT 61 02/18/2012    BLASTSPCT see below 08/25/2017    METASPCT see below 08/25/2017    LYMPHOPCT 18.1 04/15/2021    LYMPHOPCT 41.0 12/24/2017    MONOPCT 12.3 04/15/2021    BASOPCT 0.6 04/15/2021    MONOSABS 0.65 04/15/2021    LYMPHSABS 0.96 04/15/2021    EOSABS 0.18 04/15/2021    BASOSABS 0.03 04/15/2021     CMP:    Lab Results   Component Value Date     04/14/2021    K 3.3 04/14/2021    K 4.5 08/18/2019     04/14/2021    CO2 27 04/14/2021    BUN 7 04/14/2021    CREATININE 0.5 04/14/2021    GFRAA >60 04/14/2021    LABGLOM >60 04/14/2021    LABGLOM >60 12/24/2017    GLUCOSE 95 04/14/2021    GLUCOSE 101 12/24/2017    PROT 6.4 04/09/2021    LABALBU 3.1 04/09/2021    LABALBU 3.2 12/24/2017    CALCIUM 8.5 04/14/2021    BILITOT 0.3 04/09/2021    BILITOT 1+ 12/19/2017    ALKPHOS 38 04/09/2021    AST 35 04/09/2021    ALT 21 04/09/2021     Warfarin PT/INR:    Lab Results   Component Value Date    INR 1.2 01/19/2016    INR 1.1 10/21/2011    PROTIME 13.0 (H) 01/19/2016    PROTIME 11.1 10/21/2011       Assessment:    Active Problems:    Closed fracture of distal epiphysis of right femur, initial encounter [facility]  Resolved Problems:    * No resolved hospital problems.  *      Plan:  Check lab today dc planning await precert        Rosemarie Quiroz  10:19 AM  4/17/2021

## 2021-04-17 NOTE — PLAN OF CARE
psychomotor disturbance signs and symptoms  Outcome: Met This Shift     Problem: Sensory Perception - Impaired:  Goal: Demonstrations of improved sensory functioning will increase  Description: Demonstrations of improved sensory functioning will increase  Outcome: Met This Shift  Goal: Decrease in sensory misperception frequency  Description: Decrease in sensory misperception frequency  Outcome: Met This Shift  Goal: Able to refrain from responding to false sensory perceptions  Description: Able to refrain from responding to false sensory perceptions  Outcome: Met This Shift  Goal: Demonstrates accurate environmental perceptions  Description: Demonstrates accurate environmental perceptions  Outcome: Met This Shift  Goal: Able to distinguish between reality-based and nonreality-based thinking  Description: Able to distinguish between reality-based and nonreality-based thinking  Outcome: Met This Shift  Goal: Able to interrupt nonreality-based thinking  Description: Able to interrupt nonreality-based thinking  Outcome: Met This Shift     Problem: Sleep Pattern Disturbance:  Goal: Appears well-rested  Description: Appears well-rested  Outcome: Met This Shift     Problem: Pain:  Goal: Pain level will decrease  Description: Pain level will decrease  Outcome: Met This Shift  Goal: Control of acute pain  Description: Control of acute pain  Outcome: Met This Shift  Goal: Control of chronic pain  Description: Control of chronic pain  Outcome: Met This Shift

## 2021-04-18 NOTE — PLAN OF CARE
Problem: Skin Integrity:  Goal: Will show no infection signs and symptoms  Description: Will show no infection signs and symptoms  Outcome: Met This Shift  Goal: Absence of new skin breakdown  Description: Absence of new skin breakdown  Outcome: Met This Shift     Problem: Falls - Risk of:  Goal: Will remain free from falls  Description: Will remain free from falls  4/18/2021 0910 by Kena Mccord RN  Outcome: Met This Shift  4/17/2021 2349 by Valerie Fernandez  Outcome: Met This Shift  Goal: Absence of physical injury  Description: Absence of physical injury  4/18/2021 0910 by Kena Mccord RN  Outcome: Met This Shift  4/17/2021 2349 by Valerie Fernandez  Outcome: Met This Shift     Problem: Confusion - Acute:  Goal: Absence of continued neurological deterioration signs and symptoms  Description: Absence of continued neurological deterioration signs and symptoms  Outcome: Met This Shift  Goal: Mental status will be restored to baseline  Description: Mental status will be restored to baseline  Outcome: Met This Shift     Problem: Discharge Planning:  Goal: Ability to perform activities of daily living will improve  Description: Ability to perform activities of daily living will improve  Outcome: Met This Shift  Goal: Participates in care planning  Description: Participates in care planning  Outcome: Met This Shift     Problem: Injury - Risk of, Physical Injury:  Goal: Will remain free from falls  Description: Will remain free from falls  4/18/2021 0910 by Kena Mccord RN  Outcome: Met This Shift  4/17/2021 2349 by Valerie Fernandez  Outcome: Met This Shift  Goal: Absence of physical injury  Description: Absence of physical injury  4/18/2021 0910 by Kena Mccord RN  Outcome: Met This Shift  4/17/2021 2349 by Valerie Fernandez  Outcome: Met This Shift     Problem: Mood - Altered:  Goal: Mood stable  Description: Mood stable  Outcome: Met This Shift  Goal: Absence of abusive behavior  Description: Absence of abusive behavior  Outcome: Met This Shift  Goal: Verbalizations of feeling emotionally comfortable while being cared for will increase  Description: Verbalizations of feeling emotionally comfortable while being cared for will increase  Outcome: Met This Shift     Problem: Psychomotor Activity - Altered:  Goal: Absence of psychomotor disturbance signs and symptoms  Description: Absence of psychomotor disturbance signs and symptoms  Outcome: Met This Shift     Problem: Sensory Perception - Impaired:  Goal: Demonstrations of improved sensory functioning will increase  Description: Demonstrations of improved sensory functioning will increase  Outcome: Met This Shift  Goal: Decrease in sensory misperception frequency  Description: Decrease in sensory misperception frequency  Outcome: Met This Shift  Goal: Able to refrain from responding to false sensory perceptions  Description: Able to refrain from responding to false sensory perceptions  Outcome: Met This Shift  Goal: Demonstrates accurate environmental perceptions  Description: Demonstrates accurate environmental perceptions  Outcome: Met This Shift  Goal: Able to distinguish between reality-based and nonreality-based thinking  Description: Able to distinguish between reality-based and nonreality-based thinking  Outcome: Met This Shift  Goal: Able to interrupt nonreality-based thinking  Description: Able to interrupt nonreality-based thinking  Outcome: Met This Shift     Problem: Sleep Pattern Disturbance:  Goal: Appears well-rested  Description: Appears well-rested  Outcome: Met This Shift     Problem: Pain:  Goal: Pain level will decrease  Description: Pain level will decrease  4/18/2021 0910 by Jagdeep Ovalle RN  Outcome: Met This Shift  4/17/2021 2349 by Matthew Ernst  Outcome: Met This Shift  Goal: Control of acute pain  Description: Control of acute pain  4/18/2021 0910 by Jagdeep Ovalle RN  Outcome: Met This Shift  4/17/2021 2349 by Matthew Ernst  Outcome: Met This Shift  Goal: Control of chronic pain  Description: Control of chronic pain  Outcome: Met This Shift     Problem: Skin Integrity:  Goal: Will show no infection signs and symptoms  Description: Will show no infection signs and symptoms  Outcome: Met This Shift  Goal: Absence of new skin breakdown  Description: Absence of new skin breakdown  Outcome: Met This Shift     Problem: Falls - Risk of:  Goal: Will remain free from falls  Description: Will remain free from falls  4/18/2021 0910 by Portia Montes RN  Outcome: Met This Shift  4/17/2021 2349 by June Brazen  Outcome: Met This Shift  Goal: Absence of physical injury  Description: Absence of physical injury  4/18/2021 0910 by Portia Montes RN  Outcome: Met This Shift  4/17/2021 2349 by June Brazen  Outcome: Met This Shift     Problem: Confusion - Acute:  Goal: Absence of continued neurological deterioration signs and symptoms  Description: Absence of continued neurological deterioration signs and symptoms  Outcome: Met This Shift  Goal: Mental status will be restored to baseline  Description: Mental status will be restored to baseline  Outcome: Met This Shift     Problem: Discharge Planning:  Goal: Ability to perform activities of daily living will improve  Description: Ability to perform activities of daily living will improve  Outcome: Met This Shift  Goal: Participates in care planning  Description: Participates in care planning  Outcome: Met This Shift     Problem: Injury - Risk of, Physical Injury:  Goal: Will remain free from falls  Description: Will remain free from falls  4/18/2021 0910 by Portia Montes RN  Outcome: Met This Shift  4/17/2021 2349 by June Junior  Outcome: Met This Shift  Goal: Absence of physical injury  Description: Absence of physical injury  4/18/2021 0910 by Portia Montes RN  Outcome: Met This Shift  4/17/2021 2349 by June Alfredoen  Outcome: Met This Shift     Problem: Mood - Altered:  Goal: Mood stable  Description: Mood by Jeffy Rao RN  Outcome: Met This Shift  4/17/2021 2349 by Arvell Seip  Outcome: Met This Shift  Goal: Control of chronic pain  Description: Control of chronic pain  Outcome: Met This Shift

## 2021-04-18 NOTE — PROGRESS NOTES
[facility] Medicine    Subjective:  Pt alert conversive      Current Facility-Administered Medications:     potassium chloride (KLOR-CON M) extended release tablet 20 mEq, 20 mEq, Oral, Once, Ollie Webb, DO    hydrOXYzine (VISTARIL) capsule 25 mg, 25 mg, Oral, Q8H PRN, Esther Goldman MD, 25 mg at 04/18/21 6633    ciprofloxacin (CIPRO) tablet 250 mg, 250 mg, Oral, 2 times per day, Esther Goldman MD, 250 mg at 04/18/21 0830    0.9 % sodium chloride infusion, , Intravenous, PRN, Dontae Hind, DO    sodium chloride flush 0.9 % injection 10 mL, 10 mL, Intravenous, 2 times per day, Dontae Hind, DO, 10 mL at 04/16/21 0820    sodium chloride flush 0.9 % injection 10 mL, 10 mL, Intravenous, PRN, Dontae Hind, DO    0.9 % sodium chloride infusion, 25 mL, Intravenous, PRN, Dontae Hind, DO    senna (SENOKOT) tablet 8.6 mg, 1 tablet, Oral, Daily PRN, Dontae Hind, DO    enoxaparin (LOVENOX) injection 40 mg, 40 mg, Subcutaneous, Daily, Dontae Hind, DO, 40 mg at 04/18/21 2404    docusate sodium (COLACE) capsule 100 mg, 100 mg, Oral, BID, Dontae Hind, DO, 100 mg at 04/17/21 2116    gabapentin (NEURONTIN) capsule 100 mg, 100 mg, Oral, BID, Dontae Hind, DO, 100 mg at 04/18/21 3163    memantine (NAMENDA) tablet 10 mg, 10 mg, Oral, BID, Dontae Hind, DO, 10 mg at 04/18/21 2063    mirtazapine (REMERON) tablet 22.5 mg, 22.5 mg, Oral, Nightly, [place] Hind, DO, 22.5 mg at 04/17/21 2116    montelukast (SINGULAIR) tablet 10 mg, 10 mg, Oral, Nightly, Dontae Hind, DO, 10 mg at 04/17/21 2116    OLANZapine (ZYPREXA) tablet 7.5 mg, 7.5 mg, Oral, Daily, [place] Hind, DO, 7.5 mg at 04/18/21 0831    rivastigmine (EXELON) capsule 6 mg, 6 mg, Oral, BID, Dontae Rayo DO, 6 mg at 04/18/21 6051    senna (SENOKOT) tablet 8.6 mg, 1 tablet, Oral, Daily, Dontae Rayo DO, 8.6 mg at 04/17/21 0759    divalproex (DEPAKOTE) DR tablet 250 mg, 250 mg, Oral, TID, Gaurav Iyer Chip, DO, 250 mg at 04/18/21 4331    sodium chloride flush 0.9 % injection 5-40 mL, 5-40 mL, Intravenous, 2 times per day, Melvia Standing, DO, 10 mL at 04/10/21 0935    sodium chloride flush 0.9 % injection 5-40 mL, 5-40 mL, Intravenous, PRN, Melvia Standing, DO, 10 mL at 04/09/21 1658    0.9 % sodium chloride infusion, 25 mL, Intravenous, PRN, Melvia Standing, DO    promethazine (PHENERGAN) tablet 12.5 mg, 12.5 mg, Oral, Q6H PRN, 12.5 mg at 04/15/21 0838 **OR** ondansetron (ZOFRAN) injection 4 mg, 4 mg, Intravenous, Q6H PRN, Melvia Standing, DO    polyethylene glycol (GLYCOLAX) packet 17 g, 17 g, Oral, Daily PRN, Melvia Standing, DO    acetaminophen (TYLENOL) tablet 650 mg, 650 mg, Oral, Q6H PRN, 650 mg at 04/17/21 1534 **OR** acetaminophen (TYLENOL) suppository 650 mg, 650 mg, Rectal, Q6H PRN, Melvia Standing, DO, 650 mg at 04/15/21 0852    morphine (PF) injection 2 mg, 2 mg, Intravenous, Q4H PRN, Melvia Standing, DO, 2 mg at 04/11/21 0345    HYDROcodone-acetaminophen (NORCO) 5-325 MG per tablet 1 tablet, 1 tablet, Oral, Q4H PRN, Melvia Standing, DO, 1 tablet at 04/17/21 2116    Objective:    BP (!) 161/67   Pulse 60   Temp 96.8 °F (36 °C) (Temporal)   Resp 18   Ht 5' 5\" (1.651 m)   Wt 134 lb 9.6 oz (61.1 kg)   LMP  (LMP Unknown)   SpO2 96%   BMI 22.40 kg/m²     Heart:  reg  Lungs:  ctab  Abd: + bs soft nontender  Extrem:  W/o edema    CBC with Differential:    Lab Results   Component Value Date    WBC 7.5 04/17/2021    RBC 3.81 04/17/2021    HGB 11.5 04/17/2021    HCT 35.5 04/17/2021     04/17/2021    MCV 93.2 04/17/2021    MCH 30.2 04/17/2021    MCHC 32.4 04/17/2021    RDW 14.6 04/17/2021    NRBC 0.0 12/24/2017    SEGSPCT 61 02/18/2012    BLASTSPCT see below 08/25/2017    METASPCT see below 08/25/2017    LYMPHOPCT 18.1 04/15/2021    LYMPHOPCT 41.0 12/24/2017    MONOPCT 12.3 04/15/2021    BASOPCT 0.6 04/15/2021    MONOSABS 0.65 04/15/2021    LYMPHSABS 0.96 04/15/2021    EOSABS 0.18

## 2021-04-18 NOTE — PROGRESS NOTES
Physical Therapy  Treatment Note  Name: Janneth Hua  : 1933  MRN: [id number]    Referring Provider:  Zac Juárez DO    Date of Service: 2021    Evaluating PT:  Petrona Carr PT, DPT    Room #:  7317/3173-U  Diagnosis:  Closed fracture of distal epiphysis R femur  PMHx/PSHx:  DM, Macular degeneration, Depression, Cancer, Bipolar affective disorder, Dementia, Glaucoma, Dysphagia, L ankle fracture surgery 2016  Procedure/Surgery:  4/10/2021 RIGHT FEMUR OPEN REDUCTION INTERNAL FIXATION   Precautions:  Falls, NWB RLE, Hinged brace 0-15  Equipment Needs:  TBD    SUBJECTIVE:    Pt comes from [facility]. Pt poor/questionable historian and reported not getting out of bed at the facility, then reported getting up every day for meals. It is not clear prior level of function. OBJECTIVE:   Initial Evaluation  Date: 2021 Treatment  2021 Short Term/ Long Term   Goals   AM-PAC 6 Clicks     Was pt agreeable to Eval/treatment? Yes with much encouragement. Yes with encouragement. Does pt have pain? Cried out in pain with mobility of RLE, did not quantify. Unable to provide number when asked. Pt reported RLE pain with mobility. Did not quantify. Bed Mobility  Rolling: Dependent  Supine to sit: Dependent +2  Sit to supine: Dependent +2  Scooting: Dependent Rolling: Max A  Supine to sit: Max A  Sit to supine: Max A  Scooting: Max A Max A   Transfers Sit to stand: NT  Stand to sit: NT  Stand pivot: NT STS maxA x 2 Max A +1-2   Ambulation    NT NT >5 feet with WW with Max A +2   Stair negotiation: ascended and descended  NT NT NA   ROM BUE:  Shoulder ROM limited 50% by pain  BLE:  LLE WFL, RLE limted by pain     Strength BUE:  3-/5  BLE:  RLE wiggled toes, LLE 3-/5  Increase 1/3 grade MMT   Balance Sitting EOB:  Mod A  Dynamic Standing:  NT Sitting EOB: SBA Sitting EOB:  Min A  Dynamic Standing: Max A +2     Pt is A & O x 1 (self only).    Sensation:  Pt denies numbness and tingling to comfort. Pillow under BLE for comfort and pressure relief. o Pt education as noted above. PLAN:    Patient is making fair progress towards established goals. Will continue with current POC.       Time in  0845  Time out  0900    Total Treatment Time  15 minutes     CPT codes:  [] Gait training 99490 0 minutes  [] Manual therapy 73257 0 minutes  [x] Therapeutic activities 39062 15 minutes  [] Therapeutic exercises 20343 0 minutes  [] Neuromuscular reeducation 88062 0 minutes    Dianna Fletcher, PT, DPT  [id number]

## 2021-04-18 NOTE — PROGRESS NOTES
Assist    Bed Mobility  Supine to sit: Dependent x 2  Sit to supine: Dependent x2  Verbal/tactile cues for sequencing, hand placement, and technique, two person assist for safety Supine-sit: Max A  Sit-supine: Max A x 2 Supine to sit: Mod A   Sit to supine: Mod A   Functional Transfers Sit to stand: NT  Stand to sit: NT  Stand pivot: NT  Unable to safely complete Max A x 2  Sit < > stand  Sit to stand: Max A  Stand to sit: Max A  Stand pivot: Max A  Bed<>bsc/chair using fww   Functional Mobility NT  Unable to safely complete NT     Balance Sitting:     Static:CGA  -Sat edge of bed ~5 mintues    Dynamic:NT  Standing: NT Sitting: Min-SBA  Standing: Max A x 2  Side by side assist    Patient will demonstrate 15 minutes or more of unsupported sitting balance with SBA while completing ADLs/ functional activity of patient's choice   Activity Tolerance Poor+ Fair Fair   Visual/  Perceptual Glasses/corrective lenses: no  -Patient with PMH of  Glaucoma and macular degeneration           Safety       Poor       Education: Pt required vc's and physical assist for proper technique/safety with hand placement/body mechanics/posture for bed mobility/ADLs. Completed ADLs seated EOB. Pt required vc's for sequencing/initiation of ADLs. Pt able to sit EOB ~10-12 mins to increase core strength/balance/activity tolerance for ease with ADLs. Pt required increased time to complete ADLs/functional transfers due to pain management and rest breaks. Pt educated on energy conservation techniques. Pt instructed on use of call light for assistance and fall prevention. Pt demo'ing fair understanding of education provided. Continue to educate. Comments: Upon arrival to room, pt supine in bed and agreeable to OT session. At end of session, pt left upright in bed, call light within reach. · Pt has made Fair progress towards set goals.   · Continue with current plan of care    Treatment Time In: 8:38     Treatment Time Out: 9:01 Treatment Charges: Mins Units   Ther Ex  85978     Manual Therapy 25858     Thera Activities 70523 13 1   ADL/Home Mgt 87581 10 1   Neuro Re-ed 26140     Group Therapy      Orthotic manage/training  42172     Non-Billable Time     Total Timed Treatment 23 2       Saba BURDICK  [address] [place] [address] Drive, [address] [place] [address]

## 2021-04-18 NOTE — PLAN OF CARE
Problem: Falls - Risk of:  Goal: Will remain free from falls  Description: Will remain free from falls  4/17/2021 2349 by Lazarus Fearing  Outcome: Met This Shift     Problem: Falls - Risk of:  Goal: Absence of physical injury  Description: Absence of physical injury  4/17/2021 2349 by Lazarus Fearing  Outcome: Met This Shift     Problem: Injury - Risk of, Physical Injury:  Goal: Will remain free from falls  Description: Will remain free from falls  4/17/2021 2349 by Lazarus Fearing  Outcome: Met This Shift     Problem: Injury - Risk of, Physical Injury:  Goal: Absence of physical injury  Description: Absence of physical injury  4/17/2021 2349 by Lazarus Fearing  Outcome: Met This Shift

## 2021-04-19 NOTE — DISCHARGE SUMMARY
Physician Discharge Summary     Patient ID:  Isaac Song  [id number]  4 y.o.  5/12/1933    Admit date: 4/9/2021    Discharge date and time: 4/19/2021 12:27 PM     Admission Diagnoses: Active Problems:    Closed fracture of distal epiphysis of right femur, initial encounter ([facility]  Resolved Problems:    * No resolved hospital problems. *      Discharge Diagnoses: Active Problems:    Closed fracture of distal epiphysis of right femur, initial encounter ([facility]  Resolved Problems:    * No resolved hospital problems. *      Condition at discharge : Stable    Consults: IP CONSULT TO ORTHOPEDIC SURGERY  IP CONSULT TO CASE MANAGEMENT  IP CONSULT TO SOCIAL WORK  INPATIENT CONSULT TO ORTHOTIST/PROSTHETIST  IP CONSULT TO SOCIAL WORK    Procedures: Open reduction internal fixation of right comminuted supracondylar femur fracture from the right    Hospital Course: 61-year-old lady transferred from [address] [place] [address] to [facility] because of right-sided hip pain. Apparently patient fell at the nursing facility. She was then noted to have a right distal femur fracture. Patient then underwent above orthopedic procedure. She did have postop anemia from acute blood loss. Also had mild hypokalemia. Patient was then recommended for subacute rehab placement. Discharge was delayed by precertification issues. XR ABDOMEN (KUB) (SINGLE AP VIEW)   Final Result   No evidence of bowel obstruction. XR KNEE RIGHT (1-2 VIEWS)   Final Result   Satisfactory alignment status post internal fixation. XR FEMUR RIGHT (MIN 2 VIEWS)   Final Result   Satisfactory alignment status post internal fixation. FLUORO FOR SURGICAL PROCEDURES   Final Result   Intraprocedural fluoroscopic spot images as above. See separate procedure   report for more information.          CT FEMUR RIGHT WO CONTRAST   Final Result   Distal right femur fracture, without significant change compared with CT of   the knee dated 04/09/2021. No more proximal femur fracture identified. Other chronic appearing findings. XR CHEST PORTABLE   Final Result   1. Subtle opacity in the lateral aspect of the right mid and lower lung   field, similar compared to the prior study and most likely related to chronic   fibrotic change. Mild stable linear atelectasis in the left mid lung field. 2. Otherwise, no evidence of acute process.              Results for orders placed or performed during the hospital encounter of 04/09/21 (from the past 336 hour(s))   CBC    Collection Time: 04/10/21  4:34 AM   Result Value Ref Range    WBC 7.2 4.5 - 11.5 E9/L    RBC 3.38 (L) 3.50 - 5.50 E12/L    Hemoglobin 10.4 (L) 11.5 - 15.5 g/dL    Hematocrit 32.5 (L) 34.0 - 48.0 %    MCV 96.2 80.0 - 99.9 fL    MCH 30.8 26.0 - 35.0 pg    MCHC 32.0 32.0 - 34.5 %    RDW 13.7 11.5 - 15.0 fL    Platelets 458 932 - 462 E9/L    MPV 10.7 7.0 - 12.0 fL   Basic Metabolic Panel    Collection Time: 04/10/21  4:34 AM   Result Value Ref Range    Sodium 138 132 - 146 mmol/L    Potassium 4.2 3.5 - 5.0 mmol/L    Chloride 102 98 - 107 mmol/L    CO2 29 22 - 29 mmol/L    Anion Gap 7 7 - 16 mmol/L    Glucose 123 (H) 74 - 99 mg/dL    BUN 16 8 - 23 mg/dL    CREATININE 0.5 0.5 - 1.0 mg/dL    GFR Non-African American >60 >=60 mL/min/1.73    GFR African American >60     Calcium 8.9 8.6 - 10.2 mg/dL   Hemoglobin and hematocrit, blood    Collection Time: 04/11/21  4:31 AM   Result Value Ref Range    Hemoglobin 8.1 (L) 11.5 - 15.5 g/dL    Hematocrit 25.5 (L) 34.0 - 48.0 %   Basic Metabolic Panel    Collection Time: 04/12/21  8:23 AM   Result Value Ref Range    Sodium 140 132 - 146 mmol/L    Potassium 3.4 (L) 3.5 - 5.0 mmol/L    Chloride 105 98 - 107 mmol/L    CO2 27 22 - 29 mmol/L    Anion Gap 8 7 - 16 mmol/L    Glucose 79 74 - 99 mg/dL    BUN 13 8 - 23 mg/dL    CREATININE 0.6 0.5 - 1.0 mg/dL    GFR Non-African American >60 >=60 mL/min/1.73    GFR African American >60     Calcium 8.2 (L) 8.6 - 10.2 mg/dL   CBC    Collection Time: 04/12/21  8:23 AM   Result Value Ref Range    WBC 6.9 4.5 - 11.5 E9/L    RBC 2.57 (L) 3.50 - 5.50 E12/L    Hemoglobin 7.6 (L) 11.5 - 15.5 g/dL    Hematocrit 24.6 (L) 34.0 - 48.0 %    MCV 95.7 80.0 - 99.9 fL    MCH 29.6 26.0 - 35.0 pg    MCHC 30.9 (L) 32.0 - 34.5 %    RDW 14.2 11.5 - 15.0 fL    Platelets 837 787 - 329 E9/L    MPV 11.1 7.0 - 12.0 fL   TYPE AND SCREEN    Collection Time: 04/13/21 11:53 AM   Result Value Ref Range    ABO/Rh A POS     Antibody Screen NEG    PREPARE RBC (CROSSMATCH), 1 Units    Collection Time: 04/13/21 11:53 AM   Result Value Ref Range    Product Code Blood Bank [id number]     Description Blood Bank Red Blood Cells, Leuko-reduced     Unit Number [id number]     Dispense Status Blood Bank transfused    Hemoglobin and hematocrit, blood    Collection Time: 04/13/21  8:18 PM   Result Value Ref Range    Hemoglobin 11.2 (L) 11.5 - 15.5 g/dL    Hematocrit 35.1 34.0 - 48.0 %   CBC WITH AUTO DIFFERENTIAL    Collection Time: 04/14/21  7:14 AM   Result Value Ref Range    WBC 6.1 4.5 - 11.5 E9/L    RBC 3.55 3.50 - 5.50 E12/L    Hemoglobin 10.6 (L) 11.5 - 15.5 g/dL    Hematocrit 33.1 (L) 34.0 - 48.0 %    MCV 93.2 80.0 - 99.9 fL    MCH 29.9 26.0 - 35.0 pg    MCHC 32.0 32.0 - 34.5 %    RDW 14.8 11.5 - 15.0 fL    Platelets 187 544 - 843 E9/L    MPV 10.5 7.0 - 12.0 fL    Neutrophils % 50.8 43.0 - 80.0 %    Immature Granulocytes % 0.5 0.0 - 5.0 %    Lymphocytes % 32.2 20.0 - 42.0 %    Monocytes % 11.7 2.0 - 12.0 %    Eosinophils % 4.3 0.0 - 6.0 %    Basophils % 0.5 0.0 - 2.0 %    Neutrophils Absolute 3.10 1.80 - 7.30 E9/L    Immature Granulocytes # 0.03 E9/L    Lymphocytes Absolute 1.96 1.50 - 4.00 E9/L    Monocytes Absolute 0.71 0.10 - 0.95 E9/L    Eosinophils Absolute 0.26 0.05 - 0.50 E9/L    Basophils Absolute 0.03 0.00 - 0.20 N3/D   Basic Metabolic Panel    Collection Time: 04/14/21  7:14 AM   Result Value Ref Range    Sodium 144 132 - 146 mmol/L - 5 /HPF    RBC, UA 2-5 0 - 2 /HPF    Epithelial Cells, UA RARE /HPF    Bacteria, UA MANY (A) None Seen /HPF    Amorphous, UA FEW    COVID-19, Rapid    Collection Time: 04/16/21 12:38 PM   Result Value Ref Range    SARS-CoV-2, NAAT Not Detected Not Detected   CBC    Collection Time: 04/17/21 10:45 AM   Result Value Ref Range    WBC 7.5 4.5 - 11.5 E9/L    RBC 3.81 3.50 - 5.50 E12/L    Hemoglobin 11.5 11.5 - 15.5 g/dL    Hematocrit 35.5 34.0 - 48.0 %    MCV 93.2 80.0 - 99.9 fL    MCH 30.2 26.0 - 35.0 pg    MCHC 32.4 32.0 - 34.5 %    RDW 14.6 11.5 - 15.0 fL    Platelets 962 161 - 709 E9/L    MPV 9.4 7.0 - 12.0 fL   Basic Metabolic Panel    Collection Time: 04/17/21 10:45 AM   Result Value Ref Range    Sodium 141 132 - 146 mmol/L    Potassium 3.5 3.5 - 5.0 mmol/L    Chloride 103 98 - 107 mmol/L    CO2 30 (H) 22 - 29 mmol/L    Anion Gap 8 7 - 16 mmol/L    Glucose 144 (H) 74 - 99 mg/dL    BUN 18 8 - 23 mg/dL    CREATININE 0.6 0.5 - 1.0 mg/dL    GFR Non-African American >60 >=60 mL/min/1.73    GFR African American >60     Calcium 8.7 8.6 - 10.2 mg/dL   Results for orders placed or performed during the hospital encounter of 04/09/21 (from the past 336 hour(s))   CBC Auto Differential    Collection Time: 04/09/21  1:44 AM   Result Value Ref Range    WBC 7.4 4.5 - 11.5 E9/L    RBC 3.61 3.50 - 5.50 E12/L    Hemoglobin 10.9 (L) 11.5 - 15.5 g/dL    Hematocrit 34.1 34.0 - 48.0 %    MCV 94.5 80.0 - 99.9 fL    MCH 30.2 26.0 - 35.0 pg    MCHC 32.0 32.0 - 34.5 %    RDW 13.8 11.5 - 15.0 fL    Platelets 387 002 - 216 E9/L    MPV 11.0 7.0 - 12.0 fL    Neutrophils % 68.5 43.0 - 80.0 %    Immature Granulocytes % 0.4 0.0 - 5.0 %    Lymphocytes % 20.5 20.0 - 42.0 %    Monocytes % 7.9 2.0 - 12.0 %    Eosinophils % 2.4 0.0 - 6.0 %    Basophils % 0.3 0.0 - 2.0 %    Neutrophils Absolute 5.06 1.80 - 7.30 E9/L    Immature Granulocytes # 0.03 E9/L    Lymphocytes Absolute 1.51 1.50 - 4.00 E9/L    Monocytes Absolute 0.58 0.10 - 0.95 E9/L Eosinophils Absolute 0.18 0.05 - 0.50 E9/L    Basophils Absolute 0.02 0.00 - 0.20 E9/L    Polychromasia 1+    Comprehensive Metabolic Panel    Collection Time: 04/09/21  1:44 AM   Result Value Ref Range    Sodium 135 132 - 146 mmol/L    Potassium 4.4 3.5 - 5.0 mmol/L    Chloride 99 98 - 107 mmol/L    CO2 31 (H) 22 - 29 mmol/L    Anion Gap 5 (L) 7 - 16 mmol/L    Glucose 133 (H) 74 - 99 mg/dL    BUN 17 8 - 23 mg/dL    CREATININE 0.6 0.5 - 1.0 mg/dL    GFR Non-African American >60 >=60 mL/min/1.73    GFR African American >60     Calcium 8.9 8.6 - 10.2 mg/dL    Total Protein 6.4 6.4 - 8.3 g/dL    Albumin 3.1 (L) 3.5 - 5.2 g/dL    Total Bilirubin 0.3 0.0 - 1.2 mg/dL    Alkaline Phosphatase 38 35 - 104 U/L    ALT 21 0 - 32 U/L    AST 35 (H) 0 - 31 U/L   Troponin    Collection Time: 04/09/21  1:44 AM   Result Value Ref Range    Troponin <0.01 0.00 - 0.03 ng/mL   EKG 12 Lead    Collection Time: 04/09/21  2:26 AM   Result Value Ref Range    Ventricular Rate 79 BPM    Atrial Rate 79 BPM    P-R Interval 220 ms    QRS Duration 84 ms    Q-T Interval 412 ms    QTc Calculation (Bazett) 472 ms    P Axis 44 degrees    R Axis 25 degrees    T Axis 75 degrees   COVID-19, Rapid    Collection Time: 04/09/21  9:01 AM   Result Value Ref Range    SARS-CoV-2, NAAT Not Detected Not Detected         Discharge Exam:  See progress note from today    Disposition: Subacute rehab    Patient Instructions:   Discharge Medication List as of 4/19/2021 11:00 AM      START taking these medications    Details   ciprofloxacin (CIPRO) 250 MG tablet Take 1 tablet by mouth every 12 hours for 7 days, Disp-14 tablet, R-0NO PRINT      enoxaparin (LOVENOX) 40 MG/0.4ML injection Inject 0.4 mLs into the skin daily for 28 days, Disp-28 Syringe, R-0Print         CONTINUE these medications which have NOT CHANGED    Details   albuterol sulfate HFA (VENTOLIN HFA) 108 (90 Base) MCG/ACT inhaler Inhale 2 puffs into the lungs every 6 hours as needed for WheezingHistorical Med      calcium carbonate (TUMS) 500 MG chewable tablet Take 1 tablet by mouth 3 times daily as needed for HeartburnHistorical Med      divalproex (DEPAKOTE) 250 MG DR tablet Take 250 mg by mouth 3 times dailyHistorical Med      !! Multiple Vitamins-Minerals (I-ALFRED) TABS Take 1 tablet by mouthHistorical Med      loperamide (LOPERAMIDE A-D) 2 MG tablet Take 2 mg by mouth 4 times daily as needed for DiarrheaHistorical Med      magnesium hydroxide (MILK OF MAGNESIA) 400 MG/5ML suspension Take 30 mLs by mouth daily as needed for ConstipationHistorical Med      !! Multiple Vitamins-Minerals (THERAPEUTIC MULTIVITAMIN-MINERALS) tablet Take 1 tablet by mouth dailyHistorical Med      gabapentin (NEURONTIN) 100 MG capsule Take 100 mg by mouth 2 times daily. Historical Med      zinc sulfate (ZINCATE) 220 (50 Zn) MG capsule Take 50 mg by mouth dailyHistorical Med      ondansetron (ZOFRAN) 4 MG tablet Take 4 mg by mouth every 8 hours as needed for Nausea or VomitingHistorical Med      senna (SENOKOT) 8.6 MG tablet Take 1 tablet by mouth dailyHistorical Med      lactulose (CHRONULAC) 10 GM/15ML solution Take 20 g by mouth as needed Historical Med      LORazepam (ATIVAN) 0.5 MG tablet Take 1 mg by mouth 2 times daily.  Historical Med      vitamin D 1000 units CAPS Take 2 capsules by mouth dailyHistorical Med      loratadine (CLARITIN) 10 MG tablet Take 10 mg by mouth dailyHistorical Med      mirtazapine (REMERON) 30 MG tablet Take 22.5 mg by mouth nightly Historical Med      vitamin E 400 UNIT capsule Take 400 Units by mouth dailyHistorical Med      diclofenac sodium 1 % GEL Apply 2 g topically 4 times daily, Topical, 4 TIMES DAILY, Historical Med      oxybutynin (DITROPAN-XL) 10 MG extended release tablet Take 10 mg by mouth dailyHistorical Med      montelukast (SINGULAIR) 10 MG tablet Take 10 mg by mouth nightlyHistorical Med      docusate sodium (COLACE) 100 MG capsule Take 100 mg by mouth 2 times dailyHistorical Med rivastigmine (EXELON) 6 MG capsule Take 6 mg by mouth 2 times daily Historical Med      memantine (NAMENDA) 10 MG tablet Take 10 mg by mouth 2 times daily Historical Med      OLANZapine (ZYPREXA) 7.5 MG tablet Take 7.5 mg by mouth daily Historical Med      bisacodyl (DULCOLAX) 10 MG suppository Place 10 mg rectally daily as needed for ConstipationHistorical Med      acetaminophen (TYLENOL) 325 MG tablet Take 650 mg by mouth every 6 hours as needed for PainHistorical Med      Azelastine HCl 137 MCG/SPRAY SOLN 137 mcg by Nasal route dailyHistorical Med      !! Elastic Bandages & Supports (LUMBAR BACK BRACE/SUPPORT PAD) MISC DAILY Starting Sun 8/18/2019, Disp-1 each, R-0, Print      melatonin 5 MG TABS tablet Take 5 mg by mouth daily Historical Med      FLONASE 50 MCG/ACT nasal spray 1 spray by Nasal route daily, Disp-1 Bottle, R-3, DAWNormal      !! Elastic Bandages & Supports (MEDICAL COMPRESSION STOCKINGS) MISC DAILY PRN Starting Wed 6/8/2016, Disp-1 each, R-0, Print       !! - Potential duplicate medications found. Please discuss with provider.       STOP taking these medications       oxyCODONE-acetaminophen (PERCOCET) 5-325 MG per tablet Comments:   Reason for Stopping:               Activity: As tolerated    Diet: Cardiac    Follow-up with Alfie Garcia MD  [id number] [facility] [phone]    Schedule an appointment as soon as possible for a visit          Note that over 30 minutes was spent in preparing discharge papers, discussing discharge with patient, medication review, etc.    Signed:  Silva Villegas  4/19/2021  1:11 PM

## 2021-04-19 NOTE — PROGRESS NOTES
Patient discharged via transport x [facility] attendants with [facility]. All belongings sent with her via transport.

## 2021-04-19 NOTE — PROGRESS NOTES
[facility] Medicine    Subjective:      Has no new issues  No pain      Current Facility-Administered Medications:     hydrOXYzine (VISTARIL) capsule 25 mg, 25 mg, Oral, Q8H PRN, Morena Lane MD, 25 mg at 04/18/21 6407    ciprofloxacin (CIPRO) tablet 250 mg, 250 mg, Oral, 2 times per day, Morena Lane MD, 250 mg at 04/19/21 0827    0.9 % sodium chloride infusion, , Intravenous, PRN, Talat England DO    sodium chloride flush 0.9 % injection 10 mL, 10 mL, Intravenous, 2 times per day, Talat England DO, 10 mL at 04/19/21 0831    sodium chloride flush 0.9 % injection 10 mL, 10 mL, Intravenous, PRN, Talat England DO    0.9 % sodium chloride infusion, 25 mL, Intravenous, PRN, Talat England DO    senna (SENOKOT) tablet 8.6 mg, 1 tablet, Oral, Daily PRN, Talat England DO    enoxaparin (LOVENOX) injection 40 mg, 40 mg, Subcutaneous, Daily, Talat England DO, 40 mg at 04/19/21 2097    docusate sodium (COLACE) capsule 100 mg, 100 mg, Oral, BID, Talat England DO, 100 mg at 04/19/21 0655    gabapentin (NEURONTIN) capsule 100 mg, 100 mg, Oral, BID, Talat England, DO, 100 mg at 04/19/21 0827    memantine (NAMENDA) tablet 10 mg, 10 mg, Oral, BID, Talat England DO, 10 mg at 04/19/21 0827    mirtazapine (REMERON) tablet 22.5 mg, 22.5 mg, Oral, Nightly, Talat England, DO, 22.5 mg at 04/18/21 2102    montelukast (SINGULAIR) tablet 10 mg, 10 mg, Oral, Nightly, Talat England, DO, 10 mg at 04/18/21 2102    OLANZapine (ZYPREXA) tablet 7.5 mg, 7.5 mg, Oral, Daily, Talat England DO, 7.5 mg at 04/19/21 0827    rivastigmine (EXELON) capsule 6 mg, 6 mg, Oral, BID, Talat England DO, 6 mg at 04/19/21 0827    senna (SENOKOT) tablet 8.6 mg, 1 tablet, Oral, Daily, Talat England DO, 8.6 mg at 04/19/21 9326    divalproex (DEPAKOTE) DR tablet 250 mg, 250 mg, Oral, TID, Talat England DO, 250 mg at 04/19/21 0251    sodium chloride flush 0.9 % injection 5-40 mL, 5-40 mL, Intravenous, 2 times per day, Delicia Wharton DO, 10 mL at 04/19/21 0900    sodium chloride flush 0.9 % injection 5-40 mL, 5-40 mL, Intravenous, PRN, Delicia Wharton DO, 10 mL at 04/09/21 1658    0.9 % sodium chloride infusion, 25 mL, Intravenous, PRN, Delicia Wharton,     promethazine (PHENERGAN) tablet 12.5 mg, 12.5 mg, Oral, Q6H PRN, 12.5 mg at 04/15/21 0838 **OR** ondansetron (ZOFRAN) injection 4 mg, 4 mg, Intravenous, Q6H PRN, Delicia Wharton DO    polyethylene glycol (GLYCOLAX) packet 17 g, 17 g, Oral, Daily PRN, Delicia Wharton DO    acetaminophen (TYLENOL) tablet 650 mg, 650 mg, Oral, Q6H PRN, 650 mg at 04/17/21 1534 **OR** acetaminophen (TYLENOL) suppository 650 mg, 650 mg, Rectal, Q6H PRN, Delicia Wharton, , 650 mg at 04/15/21 0852    morphine (PF) injection 2 mg, 2 mg, Intravenous, Q4H PRN, Delicia Wharton, , 2 mg at 04/11/21 0345    HYDROcodone-acetaminophen (NORCO) 5-325 MG per tablet 1 tablet, 1 tablet, Oral, Q4H PRN, Delicia Wharton, , 1 tablet at 04/17/21 2116    Objective:    BP (!) 124/51   Pulse 59   Temp 97.9 °F (36.6 °C) (Temporal)   Resp 16   Ht 5' 5\" (1.651 m)   Wt 134 lb 9.6 oz (61.1 kg)   LMP  (LMP Unknown)   SpO2 97%   BMI 22.40 kg/m²     Heart:  reg  Lungs:  ctab  Abd: + bs soft nontender  Extrem:  Min edema legs    CBC with Differential:    Lab Results   Component Value Date    WBC 7.5 04/17/2021    RBC 3.81 04/17/2021    HGB 11.5 04/17/2021    HCT 35.5 04/17/2021     04/17/2021    MCV 93.2 04/17/2021    MCH 30.2 04/17/2021    MCHC 32.4 04/17/2021    RDW 14.6 04/17/2021    NRBC 0.0 12/24/2017    SEGSPCT 61 02/18/2012    BLASTSPCT see below 08/25/2017    METASPCT see below 08/25/2017    LYMPHOPCT 18.1 04/15/2021    LYMPHOPCT 41.0 12/24/2017    MONOPCT 12.3 04/15/2021    BASOPCT 0.6 04/15/2021    MONOSABS 0.65 04/15/2021    LYMPHSABS 0.96 04/15/2021    EOSABS 0.18 04/15/2021    BASOSABS 0.03 04/15/2021     CMP:    Lab Results   Component Value Date    NA 141 04/17/2021    K 3.5 04/17/2021    K 4.5 08/18/2019     04/17/2021    CO2 30 04/17/2021    BUN 18 04/17/2021    CREATININE 0.6 04/17/2021    GFRAA >60 04/17/2021    LABGLOM >60 04/17/2021    LABGLOM >60 12/24/2017    GLUCOSE 144 04/17/2021    GLUCOSE 101 12/24/2017    PROT 6.4 04/09/2021    LABALBU 3.1 04/09/2021    LABALBU 3.2 12/24/2017    CALCIUM 8.7 04/17/2021    BILITOT 0.3 04/09/2021    BILITOT 1+ 12/19/2017    ALKPHOS 38 04/09/2021    AST 35 04/09/2021    ALT 21 04/09/2021     Warfarin PT/INR:    Lab Results   Component Value Date    INR 1.2 01/19/2016    INR 1.1 10/21/2011    PROTIME 13.0 (H) 01/19/2016    PROTIME 11.1 10/21/2011       Assessment:    Active Problems:    Closed fracture of distal epiphysis of right femur[facility]  Resolved Problems:    * No resolved hospital problems.  *    Postop anemia acute blood loss   UTI               Plan:  Dc nya Faria  12:15 PM  4/19/2021

## 2021-04-19 NOTE — PROGRESS NOTES
Nurse to nurse called to Jenniffer Hong at [facility]. All paperwork faxed to facility. [facility] to  at 12 noon. All belongings packed to be sent with patient.

## 2021-04-19 NOTE — PLAN OF CARE
Problem: Falls - Risk of:  Goal: Will remain free from falls  Description: Will remain free from falls  Outcome: Met This Shift  Goal: Absence of physical injury  Description: Absence of physical injury  Outcome: Met This Shift     Problem: Injury - Risk of, Physical Injury:  Goal: Will remain free from falls  Description: Will remain free from falls  Outcome: Met This Shift  Goal: Absence of physical injury  Description: Absence of physical injury  Outcome: Met This Shift     Problem: Pain:  Goal: Pain level will decrease  Description: Pain level will decrease  Outcome: Met This Shift  Goal: Control of acute pain  Description: Control of acute pain  Outcome: Met This Shift

## 2021-04-19 NOTE — CARE COORDINATION
Patient is POD#9 Open reduction internal fixation right comminuted supracondylar femur fracture for Right displaced, comminuted supracondylar femur fracture. Nonweightbearing right lower extremity. Per Rachel Aschoff from [place] [id number], precert has been obtained. Charge nurse notified to check for discharge. Transportation set up via [employer] for 12 noon today in anticipation of discharge. Charge nurse, RN and liaison all notified. Voicemail message left for patient's son/jocelyn Hankins regarding transport time. Hens and ambulance form in envelope in soft chart. Last negative Covid-19 test was Friday 4/16.   Fletcher Reardon RN CM

## 2021-04-19 NOTE — DISCHARGE INSTR - DIET
Good nutrition is important when healing from an illness, injury, or surgery. Follow any nutrition recommendations given to you during your hospital stay. If you were given an oral nutrition supplement while in the hospital, continue to take this supplement at home. You can take it with meals, in-between meals, and/or before bedtime. These supplements can be purchased at most local grocery stores, pharmacies, and chain [employer]-stores. If you have any questions about your diet or nutrition, call the hospital and ask for the dietitian.

## (undated) DEVICE — SURGICAL PROCEDURE PACK BASIC

## (undated) DEVICE — GUIDEWIRE ORTH DIA2.5MM LOK SMOOTH DRL TIP FLX THRD FOR

## (undated) DEVICE — BIT DRL L300MM DIA4.3MM QUIK CPL PERC CALIB W/O STP REUSE

## (undated) DEVICE — 3M™ IOBAN™ 2 ANTIMICROBIAL INCISE DRAPE 6650EZ: Brand: IOBAN™ 2

## (undated) DEVICE — ADHESIVE SKIN CLSR 0.7ML TOP DERMBND ADV

## (undated) DEVICE — TOWEL,OR,DSP,ST,BLUE,DLX,10/PK,8PK/CS: Brand: MEDLINE

## (undated) DEVICE — STRIP,CLOSURE,WOUND,MEDI-STRIP,1/2X4: Brand: MEDLINE

## (undated) DEVICE — SET ORTHO STD STORTSTD2

## (undated) DEVICE — PATIENT RETURN ELECTRODE, SINGLE-USE, CONTACT QUALITY MONITORING, ADULT, WITH 9FT CORD, FOR PATIENTS WEIGING OVER 33LBS. (15KG): Brand: MEGADYNE

## (undated) DEVICE — DRAPE EQUIP CARM 72X42 IN RUBBER BND CLP

## (undated) DEVICE — DRIP REDUCTION MANIFOLD

## (undated) DEVICE — GOWN,AURORA,BRTHSLV,2XL,18/CS: Brand: MEDLINE

## (undated) DEVICE — SET ORTHO STD STORTSTD1

## (undated) DEVICE — APPLICATOR MEDICATED 26 CC SOLUTION HI LT ORNG CHLORAPREP

## (undated) DEVICE — GLOVE ORANGE PI 8   MSG9080

## (undated) DEVICE — PADDING CAST W6INXL4YD COT LO LINTING WYTEX

## (undated) DEVICE — BANDAGE COMPR W4INXL10YD WHITE/BEIGE E MTRX HK LOOP CLSR

## (undated) DEVICE — BIT DRL L300MM DIA3.2MM PERC QUIK CPL CALIB W/O STP REUSE

## (undated) DEVICE — TOTAL HIP

## (undated) DEVICE — Z DISCONTINUED PER MEDLINE USE 2741943 DRESSING AQUACEL 10 IN ALG W9XL25CM SIL CVR WTRPRF VIR BACT BARR ANTIMIC

## (undated) DEVICE — DRILL SYSTEM 7

## (undated) DEVICE — CLOTH SURG PREP PREOPERATIVE CHLORHEXIDINE GLUC 2% READYPREP

## (undated) DEVICE — 3M™ STERI-DRAPE™ U-DRAPE 1015: Brand: STERI-DRAPE™

## (undated) DEVICE — SCREW BNE L90MM DIA5MM CNDYL S STL ST VAR ANG LOK FULL THRD
Type: IMPLANTABLE DEVICE | Status: NON-FUNCTIONAL
Removed: 2021-04-10

## (undated) DEVICE — Device